# Patient Record
Sex: MALE | Race: WHITE | Employment: OTHER | URBAN - METROPOLITAN AREA
[De-identification: names, ages, dates, MRNs, and addresses within clinical notes are randomized per-mention and may not be internally consistent; named-entity substitution may affect disease eponyms.]

---

## 2017-01-20 ENCOUNTER — MYC REFILL (OUTPATIENT)
Dept: FAMILY MEDICINE | Facility: CLINIC | Age: 72
End: 2017-01-20

## 2017-01-20 DIAGNOSIS — K21.9 GASTROESOPHAGEAL REFLUX DISEASE WITHOUT ESOPHAGITIS: ICD-10-CM

## 2017-01-20 NOTE — TELEPHONE ENCOUNTER
Message from MyChart:  Original authorizing provider: MD Doug Braswell would like a refill of the following medications:  ranitidine (ZANTAC) 150 MG tablet [Liliam Weinberg MD]    Preferred pharmacy: [facility] [address] [place] [address] [address] AT [facility]    Comment:

## 2017-01-20 NOTE — TELEPHONE ENCOUNTER
Request for medication refill:    Date of last visit at clinic: 12/8/16    Please complete refill if appropriate and CLOSE ENCOUNTER.    Closing the encounter signifies the refill is complete.    If refill has been denied, please complete the smart phrase .smirefuse and route it to the [facility] RN TRIAGE pool to inform the patient and the pharmacy.    Lore Juárez RN

## 2017-02-06 ENCOUNTER — MYC REFILL (OUTPATIENT)
Dept: FAMILY MEDICINE | Facility: CLINIC | Age: 72
End: 2017-02-06

## 2017-02-06 DIAGNOSIS — I10 BENIGN ESSENTIAL HYPERTENSION: ICD-10-CM

## 2017-02-06 RX ORDER — HYDROCHLOROTHIAZIDE 25 MG/1
25 TABLET ORAL DAILY
Qty: 90 TABLET | Refills: 3 | Status: SHIPPED | OUTPATIENT
Start: 2017-02-06 | End: 2018-03-05

## 2017-02-06 RX ORDER — ENALAPRIL MALEATE 20 MG/1
40 TABLET ORAL DAILY
Qty: 180 TABLET | Refills: 1 | Status: SHIPPED | OUTPATIENT
Start: 2017-02-06 | End: 2017-08-18

## 2017-02-06 NOTE — TELEPHONE ENCOUNTER
Date of last visit at clinic: 12/8/16    Please complete refill and CLOSE ENCOUNTER.  Closing the encounter signifies the refill is complete.    Sherri Yen RN

## 2017-02-06 NOTE — TELEPHONE ENCOUNTER
Message from MyChart:  Original authorizing provider: MD Doug Braswell would like a refill of the following medications:  hydrochlorothiazide (HYDRODIURIL) 25 MG tablet [Liliam Weinberg MD]  enalapril (VASOTEC) 20 MG tablet [Liliam Weinberg MD]    Preferred pharmacy: [facility] [address] [place] [address] [address] AT [facility]    Comment:

## 2017-03-24 ENCOUNTER — AMBULATORY - HEALTHEAST (OUTPATIENT)
Dept: HEALTH INFORMATION MANAGEMENT | Facility: CLINIC | Age: 72
End: 2017-03-24

## 2017-04-24 ENCOUNTER — MYC REFILL (OUTPATIENT)
Dept: FAMILY MEDICINE | Facility: CLINIC | Age: 72
End: 2017-04-24

## 2017-04-24 DIAGNOSIS — L30.9 CHRONIC ECZEMA: ICD-10-CM

## 2017-04-25 RX ORDER — TRIAMCINOLONE ACETONIDE 1 MG/G
CREAM TOPICAL
Qty: 30 G | Refills: 0 | Status: SHIPPED | OUTPATIENT
Start: 2017-04-25 | End: 2022-05-17

## 2017-04-25 NOTE — TELEPHONE ENCOUNTER
Message from MyChart:  Original authorizing provider: MD Doug Braswell would like a refill of the following medications:  triamcinolone (KENALOG) 0.1 % cream [Liliam Weinberg MD]    Preferred pharmacy: [facility] [address] [place] [address] [address] AT [facility]    Comment:

## 2017-05-26 ENCOUNTER — OFFICE VISIT (OUTPATIENT)
Dept: FAMILY MEDICINE | Facility: CLINIC | Age: 72
End: 2017-05-26

## 2017-05-26 VITALS
SYSTOLIC BLOOD PRESSURE: 143 MMHG | RESPIRATION RATE: 18 BRPM | TEMPERATURE: 98 F | BODY MASS INDEX: 34.82 KG/M2 | WEIGHT: 315 LBS | OXYGEN SATURATION: 99 % | HEART RATE: 64 BPM | DIASTOLIC BLOOD PRESSURE: 85 MMHG

## 2017-05-26 DIAGNOSIS — I10 BENIGN ESSENTIAL HYPERTENSION: Primary | ICD-10-CM

## 2017-05-26 DIAGNOSIS — K21.9 GASTROESOPHAGEAL REFLUX DISEASE WITHOUT ESOPHAGITIS: ICD-10-CM

## 2017-05-26 DIAGNOSIS — J30.89 SEASONAL ALLERGIC RHINITIS DUE TO OTHER ALLERGIC TRIGGER: ICD-10-CM

## 2017-05-26 NOTE — PROGRESS NOTES
HPI:       Doug Nieves is a 71 year old who presents for the following  Patient presents with:  6 Month Follow Up  RECHECK: BP      Hypertension Follow-up      Outpatient blood pressures are being checked at home.  Results are 107//71.    Chest Pain? :No     Low Salt Diet: low salt    Daily NSAID Use? YES aspirin    Did patient take their HTN pills today/last night as usual?  Yes             GERD/Heartburn     Onset: 8-10+ years    Description:     Burning in chest:Yes     Intensity: moderate    Progression of Symptoms: waxing and waning    Accompanying Signs & Symptoms:  Does it feel like food gets stuck:no  Nausea: no  Vomiting (bloody?): no  Abdominal Pain: no  Black-Tarry stools:no  Bloody stools: no   History:   Previous ulcers: {no    Precipitating factors:   Caffeine use: Yes   Alcohol use: Yes Details: occasional  NSAID/Aspirin use: Yes 81 MG low dose  Tobacco use: no  Worse with no particular food or drink.    Alleviating factors:  Zantac, Tums Details:has helped with Sx  Pt states that he has worked on his diet with less liquor (3 drinks out of 5/7 day week), caffeine (2-3 cups a day),   EGD was in 2005 and normal then.         Therapies Tried and outcome:chewable antacid  Also had questions regarding chronic rhinitis and states that its a constant issue. Says that [place] suffer [place], when he returned home it returned. Thinks it could be related to environment air.  Not sure if tried antihistamines. Is using Mucinex and that works and saline nose sprays but can't seem to fully clear his am congestion.       Problem, Medication and Allergy Lists were reviewed and are current.  Patient is an established patient of this clinic.         Review of Systems:   Review of Systems          Physical Exam:     Patient Vitals for the past 24 hrs:   BP Temp Temp src Pulse Resp SpO2 Weight   05/26/17 0829 143/85 - - - - - -   05/26/17 0828 135/83 - - - -  - -   05/26/17 0827 (!) 137/101 - - - - - -   05/26/17 0826 (!) 152/94 - - - - - -   05/26/17 0750 (!) 158/91 98  F (36.7  C) Oral 64 18 99 % (!) 317 lb (143.8 kg)     Body mass index is 34.82 kg/(m^2).  Vitals were reviewed and were normal  Vital signs normal except slightly elevated BP     Physical Exam   Constitutional: He appears well-developed and well-nourished. No distress.   Cardiovascular: Normal rate, regular rhythm and normal heart sounds.    Pulmonary/Chest: Effort normal and breath sounds normal. No respiratory distress.   Musculoskeletal: He exhibits no edema.         Results:       Assessment and Plan     Doug was seen today for 6 month follow up and recheck.    Diagnoses and all orders for this visit:    Benign essential hypertension - his numbers at home are great. Here a bit higher but brings his cuff in and it is not correlating well.  His SBP with home cuff is 8 points lower and the diastolic was 2 points lower.  He is on 3 meds for BP and may well also have some white coat HTN since he home BPs are lower than here too.   Plan to have a new BP cuff and to send me his numbers. If continue high will need to bring that in and check it.   Next visit will do labs and repeat HgA1c. Weight is down today, so won't recheck today.   -     order for DME; Blood pressure cuff    Gastroesophageal reflux disease without esophagitis - change to Prilosec x 1 month and then return to Ranitidine if improved. If not better, needs EGD.  He is also working on life style, which includes dropping his coffee. Used to drink 5 cups, now down to 3.   -     omeprazole (PRILOSEC) 20 MG CR capsule; Take 1 capsule (20 mg) by mouth daily    Seasonal allergic rhinitis due to other allergic trigger - trial of flonase. Consider atrovent if no better.   -     fluticasone (VERAMYST) 27.5 MCG/SPRAY spray; Spray 1-2 sprays into both nostrils daily        Total time - 30 min with more than half spent counseling on the mgmt of HTN and  GERD.    There are no discontinued medications.  Options for treatment and follow-up care were reviewed with the patient. Doug Nieves  engaged in the decision making process and verbalized understanding of the options discussed and agreed with the final plan.    Liliam Weinberg MD

## 2017-05-26 NOTE — MR AVS SNAPSHOT
After Visit Summary   5/26/2017    Doug Nieves    MRN: [id number]           Patient Information     Date Of Birth          1945        Visit Information        Provider Department      5/26/2017 8:00 AM Liliam Weinberg MD [facility]        Today's Diagnoses     Benign essential hypertension    -  1    Gastroesophageal reflux disease without esophagitis        Seasonal allergic rhinitis due to other allergic trigger          Care Instructions    Here is the plan from today's visit    1. Benign essential hypertension  We will write for another cuff.  Continue monitoring and if it is above 140/90, then MyCHart me and we can discuss options:  ALSO - taper the coffee.  Keep alcohol at no more than 1 (rarely 2) drinks a day    2. Gastroesophageal reflux disease without esophagitis  Stop the Rantidine  Start Prilosec 20 mg a day - take it on an empty stomach in the am and then wait 30 min to eat.   Do this for a month - if you are fine then, cross back to Ranitidine for maintenance.  Overlap them some, so start the Ranitidine while on the Prilosec and use together for a few a days.  Ranitidine won't work as well at first but should over the next month  Tapering the coffee, loose belts, consider books under the head of the bed's legs to put the mattress at an incline, acid foods (tomatoe, orange juice, lemonade)  If you are no better - we should do endoscopy      3. Seasonal allergic rhinitis due to other allergic trigger  Try the nose spray daily for a month.  If that doesn't help, then consider contacting me and we can try Atrovent nasal spray.   - fluticasone (VERAMYST) 27.5 MCG/SPRAY spray; Spray 1-2 sprays into both nostrils daily  Dispense: 10 g; Refill: 3        Please call or return to clinic if your symptoms don't go away.    Follow up plan  See me in the six months    Thank you for coming to [facility] today.  Lab Testing:  **If you had lab testing today and your  results are reassuring or normal they will be mailed to you or sent through [employer] within 7 days.   **If the lab tests need quick action we will call you with the results.  The phone number we will call with results is # [phone] (home) . If this is not the best number please call our clinic and change the number.  Medication Refills:  If you need any refills please call your pharmacy and they will contact us.   If you need to  your refill at a new pharmacy, please contact the new pharmacy directly. The new pharmacy will help you get your medications transferred faster.   Scheduling:  If you have any concerns about today's visit or wish to schedule another appointment please call our office during normal business hours [phone] (8-5:00 M-F)  If a referral was made to a [facility] and you don't get a call from central scheduling please call [phone].  If a Mammogram was ordered for you at [facility] call [phone] to schedule or change your appointment.  If you had an XRay/CT/Ultrasound/MRI ordered the number is [phone] to schedule or change your radiology appointment.   Medical Concerns:  If you have urgent medical concerns please call [phone] at any time of the day.  If you have a medical emergency please call [phone].          Follow-ups after your visit        Follow-up notes from your care team     Return in about 6 months (around 11/26/2017).      Who to contact     Please call your clinic at [phone] to:    Ask questions about your health    Make or cancel appointments    Discuss your medicines    Learn about your test results    Speak to your doctor   If you have compliments or concerns about an experience at your clinic, or if you wish to file a complaint, please contact [facility] Patient Relations at [phone] or email us at [email or web address]         Additional Information About Your Visit         IDRI ([employer])hart Information     [employer] gives you secure access to your electronic health record. If you see a primary care provider, you can also send messages to your care team and make appointments. If you have questions, please call your primary care clinic.  If you do not have a primary care provider, please call [phone] and they will assist you.      [employer] is an electronic gateway that provides easy, online access to your medical records. With [employer], you can request a clinic appointment, read your test results, renew a prescription or communicate with your care team.     To access your existing account, please contact your [facility] or call [phone] for assistance.        Care EveryWhere ID     This is your Care EveryWhere ID. This could be used by other organizations to access your [facility] medical records  [id number]        Your Vitals Were     Pulse Temperature Respirations Pulse Oximetry BMI (Body Mass Index)       64 98  F (36.7  C) (Oral) 18 99% 34.82 kg/m2        Blood Pressure from Last 3 Encounters:   05/26/17 143/85   12/08/16 135/85   06/24/16 133/81    Weight from Last 3 Encounters:   05/26/17 (!) 317 lb (143.8 kg)   12/08/16 (!) 320 lb 9.6 oz (145.4 kg)   06/24/16 (!) 321 lb 6.4 oz (145.8 kg)              Today, you had the following     No orders found for display         Today's Medication Changes          These changes are accurate as of: 5/26/17  8:27 AM.  If you have any questions, ask your nurse or doctor.               Start taking these medicines.        Dose/Directions    fluticasone 27.5 MCG/SPRAY spray   Commonly known as:  VERAMYST   Used for:  Seasonal allergic rhinitis due to other allergic trigger   Started by:  Liliam Weinberg MD        Dose:  1-2 spray   Spray 1-2 sprays into both nostrils daily   Quantity:  10 g   Refills:  3       omeprazole 20 MG CR capsule   Commonly known as:  priLOSEC   Used for:  Gastroesophageal reflux disease  without esophagitis   Started by:  Liliam Weinberg MD        Dose:  20 mg   Take 1 capsule (20 mg) by mouth daily   Quantity:  30 capsule   Refills:  1       order for DME   Used for:  Benign essential hypertension   Started by:  iLliam Weinberg MD        Blood pressure cuff   Quantity:  1 Units   Refills:  0            Where to get your medicines      These medications were sent to [facility] [address] [place] [address] [address] AT [place] [address] [address], [facility] [address]     Phone:  [phone]     fluticasone 27.5 MCG/SPRAY spray    omeprazole 20 MG CR capsule         Some of these will need a paper prescription and others can be bought over the counter.  Ask your nurse if you have questions.     Bring a paper prescription for each of these medications     order for DME                Primary Care Provider Office Phone # Fax #    Liliam Weinberg [phone]       [facility] [address] [address]        Thank you!     Thank you for choosing [facility]  for your care. Our goal is always to provide you with excellent care. Hearing back from our patients is one way we can continue to improve our services. Please take a few minutes to complete the written survey that you may receive in the mail after your visit with us. Thank you!             Your Updated Medication List - Protect others around you: Learn how to safely use, store and throw away your medicines at www.disposemymeds.org.          This list is accurate as of: 5/26/17  8:27 AM.  Always use your most recent med list.                   Brand Name Dispense Instructions for use    amLODIPine 5 MG tablet    NORVASC    90 tablet    Take 1 tablet (5 mg) by mouth daily       ASPIRIN LOW DOSE 81 MG tablet   Generic drug:  aspirin      Take 1 tablet by mouth daily.       * atorvastatin 20 MG tablet    LIPITOR    90 tablet    Take 1 tablet (20 mg) by  mouth daily       * atorvastatin 40 MG tablet    LIPITOR    90 tablet    Take 1 tablet (40 mg) by mouth daily       Blood Glucose Monitoring Suppl Génesis      Use as directed       enalapril 20 MG tablet    VASOTEC    180 tablet    Take 2 tablets (40 mg) by mouth daily       fluticasone 27.5 MCG/SPRAY spray    VERAMYST    10 g    Spray 1-2 sprays into both nostrils daily       hydrochlorothiazide 25 MG tablet    HYDRODIURIL    90 tablet    Take 1 tablet (25 mg) by mouth daily       omeprazole 20 MG CR capsule    priLOSEC    30 capsule    Take 1 capsule (20 mg) by mouth daily       order for DME     1 Units    Blood pressure cuff       ranitidine 150 MG tablet    ZANTAC    180 tablet    Take 1 tablet (150 mg) by mouth 2 times daily       rosuvastatin 20 MG tablet    CRESTOR    90 tablet    Take 1 tablet (20 mg) by mouth daily       triamcinolone 0.1 % cream    KENALOG    30 g    Apply sparingly to affected area three times daily for 14 days.       * Notice:  This list has 2 medication(s) that are the same as other medications prescribed for you. Read the directions carefully, and ask your doctor or other care provider to review them with you.

## 2017-05-26 NOTE — PATIENT INSTRUCTIONS
Here is the plan from today's visit    1. Benign essential hypertension  We will write for another cuff.  Continue monitoring and if it is above 140/90, then MyCHart me and we can discuss options:  ALSO - taper the coffee.  Keep alcohol at no more than 1 (rarely 2) drinks a day    2. Gastroesophageal reflux disease without esophagitis  Stop the Rantidine  Start Prilosec 20 mg a day - take it on an empty stomach in the am and then wait 30 min to eat.   Do this for a month - if you are fine then, cross back to Ranitidine for maintenance.  Overlap them some, so start the Ranitidine while on the Prilosec and use together for a few a days.  Ranitidine won't work as well at first but should over the next month  Tapering the coffee, loose belts, consider books under the head of the bed's legs to put the mattress at an incline, acid foods (tomatoe, orange juice, lemonade)  If you are no better - we should do endoscopy      3. Seasonal allergic rhinitis due to other allergic trigger  Try the nose spray daily for a month.  If that doesn't help, then consider contacting me and we can try Atrovent nasal spray.   - fluticasone (VERAMYST) 27.5 MCG/SPRAY spray; Spray 1-2 sprays into both nostrils daily  Dispense: 10 g; Refill: 3        Please call or return to clinic if your symptoms don't go away.    Follow up plan  See me in the six months    Thank you for coming to [facility] today.  Lab Testing:  **If you had lab testing today and your results are reassuring or normal they will be mailed to you or sent through "[employer] within 7 days.   **If the lab tests need quick action we will call you with the results.  The phone number we will call with results is # [phone] (home) . If this is not the best number please call our clinic and change the number.  Medication Refills:  If you need any refills please call your pharmacy and they will contact us.   If you need to  your refill at a new pharmacy, please contact the  new pharmacy directly. The new pharmacy will help you get your medications transferred faster.   Scheduling:  If you have any concerns about today's visit or wish to schedule another appointment please call our office during normal business hours [phone] (8-5:00 M-F)  If a referral was made to a [facility] and you don't get a call from central scheduling please call [phone].  If a Mammogram was ordered for you at [facility] call [phone] to schedule or change your appointment.  If you had an XRay/CT/Ultrasound/MRI ordered the number is [phone] to schedule or change your radiology appointment.   Medical Concerns:  If you have urgent medical concerns please call [phone] at any time of the day.  If you have a medical emergency please call [phone].

## 2017-06-19 ENCOUNTER — OFFICE VISIT (OUTPATIENT)
Dept: FAMILY MEDICINE | Facility: CLINIC | Age: 72
End: 2017-06-19

## 2017-06-19 VITALS
BODY MASS INDEX: 36.08 KG/M2 | DIASTOLIC BLOOD PRESSURE: 85 MMHG | TEMPERATURE: 98.5 F | HEART RATE: 90 BPM | SYSTOLIC BLOOD PRESSURE: 136 MMHG | OXYGEN SATURATION: 97 % | HEIGHT: 78 IN | RESPIRATION RATE: 16 BRPM | WEIGHT: 311.8 LBS

## 2017-06-19 DIAGNOSIS — H61.23 BILATERAL IMPACTED CERUMEN: Primary | ICD-10-CM

## 2017-06-19 ASSESSMENT — ENCOUNTER SYMPTOMS
ACTIVITY CHANGE: 0
FATIGUE: 0
APPETITE CHANGE: 0
FEVER: 0
NECK STIFFNESS: 1
RHINORRHEA: 1
SLEEP DISTURBANCE: 0
NECK PAIN: 0
HEADACHES: 0
UNEXPECTED WEIGHT CHANGE: 0

## 2017-06-19 NOTE — MR AVS SNAPSHOT
After Visit Summary   6/19/2017    Doug iNeves    MRN: [id number]           Patient Information     Date Of Birth          1945        Visit Information        Provider Department      6/19/2017 2:00 PM Patric Merritt MD [facility]        Today's Diagnoses     Bilateral impacted cerumen    -  1      Care Instructions      Earwax, Home Treatment    Everyone produces earwax from the lining of the ear canal. It serves to lubricate and protect the ear. The wax that forms in the canal naturally moves toward the outside of the ear and falls out. Sometimes the ear canal may contain too much wax. This can cause a blockage and loss of hearing. Directions are given below for home treatment.  Home care  If your doctor has advised you to remove a wax blockage yourself, follow these directions:    Unless a medicine was prescribed, you may use an over-the-counter product made for clearing earwax. These contain carbamide peroxide. Lie down with the blocked ear facing upward. Apply one dropper full of medicine and wait a few minutes. Grasp the outer ear and wiggle it to help the solution enter the canal.    Lean over a sink or basin with the blocked ear facing downward. Use a bulb syringe filled with warm (not hot or cold) water to rinse the ear several times. Use gentle pressure only.    If you are having trouble draining the water out of your ear canal, put a few drops of rubbing alcohol (isopropyl alcohol) into the ear canal. This will help remove the remaining water.    Repeat this procedure once a day for up to three days, or until your hearing is back to normal. Do not use this treatment for more than three days in a row.  Don ts    Don t use cold water to rinse the ear. This will make you dizzy.    Don t perform this procedure if you have an ear infection.    Don t perform this procedure if you have a ruptured eardrum.    Don t use cotton swabs, matches, hairpins, keys, or other  objects to  clean  the ear canal. This can cause infection of the ear canal or rupture the eardrum. Because of their size and shape, cotton swabs can push earwax deeper into the ear canal instead of removing it.  Follow-up care  Follow up with your health care provider if you are not improving after three cleaning attempts, or as advised.  When to seek medical advice  Call your health care provider right away if any of these occur:    Worsening ear pain    Fever of 101 F (38.3 C) or higher, or as directed by your health care provider    Hearing does not return to normal after three days of treatment    Fluid drainage or bleeding from the ear canal    Swelling, redness, or tenderness of the outer ear    Headache, neck pain, or stiff neck    6829-7602 [employer]. [address] [place] [address], [place] [address]. All rights reserved. This information is not intended as a substitute for professional medical care. Always follow your healthcare professional's instructions.                Follow-ups after your visit        Who to contact     Please call your clinic at [phone] to:    Ask questions about your health    Make or cancel appointments    Discuss your medicines    Learn about your test results    Speak to your doctor   If you have compliments or concerns about an experience at your clinic, or if you wish to file a complaint, please contact [facility] Patient Relations at [phone] or email us at [email or web address]         Additional Information About Your Visit        Dealisedhart Information     [employer] gives you secure access to your electronic health record. If you see a primary care provider, you can also send messages to your care team and make appointments. If you have questions, please call your primary care clinic.  If you do not have a primary care provider, please call [phone] and they will assist you.      [employer] is an electronic gateway that  "provides easy, online access to your medical records. With [employer], you can request a clinic appointment, read your test results, renew a prescription or communicate with your care team.     To access your existing account, please contact your [facility] or call [phone] for assistance.        Care EveryWhere ID     This is your Care EveryWhere ID. This could be used by other organizations to access your [place] medical records  [id number]        Your Vitals Were     Pulse Temperature Respirations Height Pulse Oximetry BMI (Body Mass Index)    90 98.5  F (36.9  C) (Oral) 16 6' 8\" (203.2 cm) 97% 34.25 kg/m2       Blood Pressure from Last 3 Encounters:   06/19/17 136/85   05/26/17 143/85   12/08/16 135/85    Weight from Last 3 Encounters:   06/19/17 (!) 311 lb 12.8 oz (141.4 kg)   05/26/17 (!) 317 lb (143.8 kg)   12/08/16 (!) 320 lb 9.6 oz (145.4 kg)              Today, you had the following     No orders found for display       Primary Care Provider Office Phone # Fax #    Liliam Weinberg [phone]       [facility] [address] [address]        Thank you!     Thank you for choosing [facility]  for your care. Our goal is always to provide you with excellent care. Hearing back from our patients is one way we can continue to improve our services. Please take a few minutes to complete the written survey that you may receive in the mail after your visit with us. Thank you!             Your Updated Medication List - Protect others around you: Learn how to safely use, store and throw away your medicines at www.disposemymeds.org.          This list is accurate as of: 6/19/17  3:17 PM.  Always use your most recent med list.                   Brand Name Dispense Instructions for use    amLODIPine 5 MG tablet    NORVASC    90 tablet    Take 1 tablet (5 mg) by mouth daily       ASPIRIN LOW DOSE 81 MG tablet   Generic drug: "  aspirin      Take 1 tablet by mouth daily.       * atorvastatin 20 MG tablet    LIPITOR    90 tablet    Take 1 tablet (20 mg) by mouth daily       * atorvastatin 40 MG tablet    LIPITOR    90 tablet    Take 1 tablet (40 mg) by mouth daily       Blood Glucose Monitoring Suppl Génesis      Use as directed       enalapril 20 MG tablet    VASOTEC    180 tablet    Take 2 tablets (40 mg) by mouth daily       fluticasone 27.5 MCG/SPRAY spray    VERAMYST    10 g    Spray 1-2 sprays into both nostrils daily       hydrochlorothiazide 25 MG tablet    HYDRODIURIL    90 tablet    Take 1 tablet (25 mg) by mouth daily       omeprazole 20 MG CR capsule    priLOSEC    30 capsule    Take 1 capsule (20 mg) by mouth daily       order for DME     1 Units    Blood pressure cuff       ranitidine 150 MG tablet    ZANTAC    180 tablet    Take 1 tablet (150 mg) by mouth 2 times daily       rosuvastatin 20 MG tablet    CRESTOR    90 tablet    Take 1 tablet (20 mg) by mouth daily       triamcinolone 0.1 % cream    KENALOG    30 g    Apply sparingly to affected area three times daily for 14 days.       * Notice:  This list has 2 medication(s) that are the same as other medications prescribed for you. Read the directions carefully, and ask your doctor or other care provider to review them with you.

## 2017-06-19 NOTE — PROGRESS NOTES
"      HPI:       Doug Nieves is a 71 year old who presents for the following  Patient presents with:  Cerumen Impaction    Concern: feeling ears are clogged or full  Onset 2-3 weeks  [place] in nature, feels like pressure with decrease hearing acuity.   Has tried OTC wax removal.   Hx of similar problem in 3625-2695 and was due to ear wax. Seen by ENT and had a hearing evaluation which was normal.  Feels like ear is full (swmmers ear)     History of chronic rhinitis. Using mucenx DM and a nasal spray (flnase) .     Problem, Medication and Allergy Lists were reviewed and are current.  Patient is an established patient of this clinic.         Review of Systems:   Review of Systems   Constitutional: Negative for activity change, appetite change, fatigue, fever and unexpected weight change.   HENT: Positive for rhinorrhea. Negative for congestion and tinnitus.    Musculoskeletal: Positive for neck stiffness. Negative for neck pain.   Skin: Negative for rash.   Neurological: Negative for headaches.   Psychiatric/Behavioral: Negative for sleep disturbance.             Physical Exam:     Patient Vitals for the past 24 hrs:   BP Temp Temp src Pulse Resp SpO2 Height Weight   06/19/17 1408 136/85 98.5  F (36.9  C) Oral 90 16 97 % 6' 8\" (203.2 cm) (!) 311 lb 12.8 oz (141.4 kg)     Body mass index is 34.25 kg/(m^2).  Vitals were reviewed and were normal     Physical Exam   Constitutional: He is oriented to person, place, and time. He appears well-developed and well-nourished. No distress.   HENT:   Head: Normocephalic and atraumatic.   Right Ear: Tympanic membrane, external ear and ear canal normal.   Left Ear: Tympanic membrane and external ear normal.   Cerumen noted in both ears L>R    Irrigated. Able to visualize TM. Appeared WNL     Musculoskeletal:   Stand up and go test in less than 10 seconds.    Neurological: He is alert and oriented to person, place, and time.   Skin: Skin is warm. No rash noted. He is not " diaphoretic.   Psychiatric: He has a normal mood and affect. His behavior is normal.         Results:     None   Assessment and Plan     1. Bilateral impacted cerumen  Irrigated both ears. Pt reported resolution of his symptoms  Had hearing test done in 2009/ 2010 and was normal  - Ear wax removal    Reviewed HM items. No falls in the last 2 years. Get up and go test in less than 10 seconds     There are no discontinued medications.  Options for treatment and follow-up care were reviewed with the patient. Doug Nieves  engaged in the decision making process and verbalized understanding of the options discussed and agreed with the final plan.    Patric Merritt MD

## 2017-06-19 NOTE — PROGRESS NOTES
Preceptor Attestation:   Patient seen and discussed with the resident. Assessment and plan reviewed with resident and agreed upon.   Supervising Physician:  Ravin Cruz MD  [facility]

## 2017-06-19 NOTE — PATIENT INSTRUCTIONS
Earwax, Home Treatment    Everyone produces earwax from the lining of the ear canal. It serves to lubricate and protect the ear. The wax that forms in the canal naturally moves toward the outside of the ear and falls out. Sometimes the ear canal may contain too much wax. This can cause a blockage and loss of hearing. Directions are given below for home treatment.  Home care  If your doctor has advised you to remove a wax blockage yourself, follow these directions:    Unless a medicine was prescribed, you may use an over-the-counter product made for clearing earwax. These contain carbamide peroxide. Lie down with the blocked ear facing upward. Apply one dropper full of medicine and wait a few minutes. Grasp the outer ear and wiggle it to help the solution enter the canal.    Lean over a sink or basin with the blocked ear facing downward. Use a bulb syringe filled with warm (not hot or cold) water to rinse the ear several times. Use gentle pressure only.    If you are having trouble draining the water out of your ear canal, put a few drops of rubbing alcohol (isopropyl alcohol) into the ear canal. This will help remove the remaining water.    Repeat this procedure once a day for up to three days, or until your hearing is back to normal. Do not use this treatment for more than three days in a row.  Don ts    Don t use cold water to rinse the ear. This will make you dizzy.    Don t perform this procedure if you have an ear infection.    Don t perform this procedure if you have a ruptured eardrum.    Don t use cotton swabs, matches, hairpins, keys, or other objects to  clean  the ear canal. This can cause infection of the ear canal or rupture the eardrum. Because of their size and shape, cotton swabs can push earwax deeper into the ear canal instead of removing it.  Follow-up care  Follow up with your health care provider if you are not improving after three cleaning attempts, or as advised.  When to seek medical  advice  Call your health care provider right away if any of these occur:    Worsening ear pain    Fever of 101 F (38.3 C) or higher, or as directed by your health care provider    Hearing does not return to normal after three days of treatment    Fluid drainage or bleeding from the ear canal    Swelling, redness, or tenderness of the outer ear    Headache, neck pain, or stiff neck    7517-6353 [employer]. [address] [place] [address]. All rights reserved. This information is not intended as a substitute for professional medical care. Always follow your healthcare professional's instructions.

## 2017-07-19 ENCOUNTER — MYC MEDICAL ADVICE (OUTPATIENT)
Dept: FAMILY MEDICINE | Facility: CLINIC | Age: 72
End: 2017-07-19

## 2017-07-19 NOTE — TELEPHONE ENCOUNTER
"RN called patient to discuss \"cyst in armpit\". Patient states it is his Right armpit on lower part. States it seems surface level. States he noticed it this morning and it is tender. States it is bright red and nickel sized. States he was golfing yesterday but it does not seem like shearing or chaffing to him. RN advised would be a good idea to have him come in to clinic to have it looked at. RN instructed if area gets bigger or more painful, drainage or has a fever to go in to urgent care. Pt verbalized understanding.     Bee Kraft RN    "

## 2017-08-18 ENCOUNTER — MYC REFILL (OUTPATIENT)
Dept: FAMILY MEDICINE | Facility: CLINIC | Age: 72
End: 2017-08-18

## 2017-08-18 DIAGNOSIS — I10 BENIGN ESSENTIAL HYPERTENSION: ICD-10-CM

## 2017-08-18 RX ORDER — ENALAPRIL MALEATE 20 MG/1
40 TABLET ORAL DAILY
Qty: 180 TABLET | Refills: 1 | Status: SHIPPED | OUTPATIENT
Start: 2017-08-18 | End: 2018-03-03

## 2017-08-18 NOTE — TELEPHONE ENCOUNTER
Request for medication refill:    Date of last visit at clinic: 6/19/17    Please complete refill if appropriate and CLOSE ENCOUNTER.    Closing the encounter signifies the refill is complete.    If refill has been denied, please complete the smart phrase .smirefuse and route it to the [facility] RN TRIAGE pool to inform the patient and the pharmacy.    Bee Kraft RN

## 2017-08-18 NOTE — TELEPHONE ENCOUNTER
Message from Sojernhart:  Original authorizing provider: MD Doug Wise would like a refill of the following medications:  enalapril (VASOTEC) 20 MG tablet [Kingsley Keenan MD]    Preferred pharmacy: [facility] [address] [place] [address] [address] AT [facility]    Comment:

## 2017-10-02 DIAGNOSIS — J30.2 CHRONIC SEASONAL ALLERGIC RHINITIS, UNSPECIFIED TRIGGER: Primary | ICD-10-CM

## 2017-10-02 NOTE — TELEPHONE ENCOUNTER
Request for medication refill:    Date of last visit at clinic: 6-19-17    Please complete refill if appropriate and CLOSE ENCOUNTER.    Closing the encounter signifies the refill is complete.    If refill has been denied, please complete the smart phrase .smirefuse and route it to the [facility] RN TRIAGE pool to inform the patient and the pharmacy.    Macarena Rodriguez, CMA

## 2017-10-03 ENCOUNTER — ALLIED HEALTH/NURSE VISIT (OUTPATIENT)
Dept: FAMILY MEDICINE | Facility: CLINIC | Age: 72
End: 2017-10-03

## 2017-10-03 ENCOUNTER — TELEPHONE (OUTPATIENT)
Dept: FAMILY MEDICINE | Facility: CLINIC | Age: 72
End: 2017-10-03

## 2017-10-03 DIAGNOSIS — Z00.00 PREVENTATIVE HEALTH CARE: Primary | ICD-10-CM

## 2017-10-03 NOTE — MR AVS SNAPSHOT
After Visit Summary   10/3/2017    Doug Nieves    MRN: [id number]           Patient Information     Date Of Birth          1945        Visit Information        Provider Department      10/3/2017 10:00 AM [facility]        Today's Diagnoses     Preventative health care    -  1       Follow-ups after your visit        Who to contact     Please call your clinic at [phone] to:    Ask questions about your health    Make or cancel appointments    Discuss your medicines    Learn about your test results    Speak to your doctor   If you have compliments or concerns about an experience at your clinic, or if you wish to file a complaint, please contact [facility] Patient Relations at [phone] or email us at [email or web address]         Additional Information About Your Visit        MyChart Information     [employer] gives you secure access to your electronic health record. If you see a primary care provider, you can also send messages to your care team and make appointments. If you have questions, please call your primary care clinic.  If you do not have a primary care provider, please call [phone] and they will assist you.      [employer]olution is an electronic gateway that provides easy, online access to your medical records. With [employer], you can request a clinic appointment, read your test results, renew a prescription or communicate with your care team.     To access your existing account, please contact your [facility] or call [phone] for assistance.        Care EveryWhere ID     This is your Care EveryWhere ID. This could be used by other organizations to access your [place] medical records  [id number]         Blood Pressure from Last 3 Encounters:   06/19/17 136/85   05/26/17 143/85   12/08/16 135/85    Weight from Last 3 Encounters:   06/19/17 (!) 311 lb 12.8 oz (141.4 kg)    05/26/17 (!) 317 lb (143.8 kg)   12/08/16 (!) 320 lb 9.6 oz (145.4 kg)              We Performed the Following     ADMIN VACCINE, INITIAL     Flu vaccine, high dose        Primary Care Provider Office Phone # Fax #    Liliam Weinberg [phone]       [address] [address]        Equal Access to Services     BRANDON LOGAN : Hadii aad ku hadasho Soomaali, waaxda luqadaha, qaybta kaalmada adeegyada, waxay idiin hayaan adeeg kharash la'aan . So [facility] [phone].    ATENCIÓN: Si chase pugh, tiene a hawk disposición servicios gratuitos de asistencia lingüística. Llame al [phone].    We comply with applicable federal civil rights laws and [place] laws. We do not discriminate on the basis of race, color, national origin, age, disability, sex, sexual orientation, or gender identity.            Thank you!     Thank you for choosing \[facility]\"" [facility]  for your care. Our goal is always to provide you with excellent care. Hearing back from our patients is one way we can continue to improve our services. Please take a few minutes to complete the written survey that you may receive in the mail after your visit with us. Thank you!             Your Updated Medication List - Protect others around you: Learn how to safely use, store and throw away your medicines at www.disposemymeds.org.          This list is accurate as of: 10/3/17 11:59 PM.  Always use your most recent med list.                   Brand Name Dispense Instructions for use Diagnosis    amLODIPine 5 MG tablet    NORVASC    90 tablet    Take 1 tablet (5 mg) by mouth daily    Essential hypertension       ASPIRIN LOW DOSE 81 MG tablet   Generic drug:  aspirin      Take 1 tablet by mouth daily.        * atorvastatin 20 MG tablet    LIPITOR    90 tablet    Take 1 tablet (20 mg) by mouth daily    Hyperlipidemia LDL goal <130       * atorvastatin 40 MG tablet    LIPITOR    90 tablet    Take 1 tablet (40 mg)  by mouth daily    Essential hypertension       Blood Glucose Monitoring Suppl Génesis      Use as directed        enalapril 20 MG tablet    VASOTEC    180 tablet    Take 2 tablets (40 mg) by mouth daily    Benign essential hypertension       fluticasone 27.5 MCG/SPRAY spray    VERAMYST    10 g    Spray 1-2 sprays into both nostrils daily    Chronic seasonal allergic rhinitis, unspecified trigger       hydrochlorothiazide 25 MG tablet    HYDRODIURIL    90 tablet    Take 1 tablet (25 mg) by mouth daily    Benign essential hypertension       omeprazole 20 MG CR capsule    priLOSEC    30 capsule    Take 1 capsule (20 mg) by mouth daily    Gastroesophageal reflux disease without esophagitis       order for DME     1 Units    Blood pressure cuff    Benign essential hypertension       ranitidine 150 MG tablet    ZANTAC    180 tablet    Take 1 tablet (150 mg) by mouth 2 times daily    Gastroesophageal reflux disease without esophagitis       rosuvastatin 20 MG tablet    CRESTOR    90 tablet    Take 1 tablet (20 mg) by mouth daily    Essential hypertension with goal blood pressure less than 130/80       triamcinolone 0.1 % cream    KENALOG    30 g    Apply sparingly to affected area three times daily for 14 days.    Chronic eczema       * Notice:  This list has 2 medication(s) that are the same as other medications prescribed for you. Read the directions carefully, and ask your doctor or other care provider to review them with you.

## 2017-10-03 NOTE — NURSING NOTE
Doug Nieves      1.  Has the patient received the information for the influenza vaccine? YES    2.  Does the patient have any of the following contraindications?     Allergy to eggs? No     Allergic reaction to previous influenza vaccines? No     Any other problems to previous influenza vaccines? No     Paralyzed by Guillain-Yulee syndrome? No     Currently pregnant? NO     Current moderate or severe illness? No    Vaccination given by Thelma Rojas CMA.  Recorded by Thelma Rojas

## 2017-10-07 ENCOUNTER — OFFICE VISIT (OUTPATIENT)
Dept: URGENT CARE | Facility: URGENT CARE | Age: 72
End: 2017-10-07
Payer: COMMERCIAL

## 2017-10-07 VITALS
TEMPERATURE: 98 F | OXYGEN SATURATION: 98 % | DIASTOLIC BLOOD PRESSURE: 78 MMHG | SYSTOLIC BLOOD PRESSURE: 126 MMHG | HEART RATE: 66 BPM | WEIGHT: 304 LBS | BODY MASS INDEX: 35.17 KG/M2 | HEIGHT: 78 IN

## 2017-10-07 DIAGNOSIS — R09.82 POST-NASAL DRIP: Primary | ICD-10-CM

## 2017-10-07 DIAGNOSIS — K21.9 GASTROESOPHAGEAL REFLUX DISEASE WITHOUT ESOPHAGITIS: ICD-10-CM

## 2017-10-07 PROCEDURE — 99213 OFFICE O/P EST LOW 20 MIN: CPT | Performed by: INTERNAL MEDICINE

## 2017-10-07 RX ORDER — GUAIFENESIN 600 MG/1
400 TABLET, EXTENDED RELEASE ORAL 2 TIMES DAILY
COMMUNITY
End: 2022-06-21

## 2017-10-07 RX ORDER — FLUTICASONE PROPIONATE 50 MCG
1-2 SPRAY, SUSPENSION (ML) NASAL DAILY
Qty: 1 BOTTLE | Refills: 0 | Status: SHIPPED | OUTPATIENT
Start: 2017-10-07 | End: 2018-02-12

## 2017-10-07 RX ORDER — OMEPRAZOLE 40 MG/1
40 CAPSULE, DELAYED RELEASE ORAL DAILY
Qty: 30 CAPSULE | Refills: 0 | Status: SHIPPED | OUTPATIENT
Start: 2017-10-07 | End: 2017-10-17

## 2017-10-07 RX ORDER — LORATADINE 10 MG/1
10 TABLET ORAL DAILY
COMMUNITY
End: 2018-12-27

## 2017-10-07 ASSESSMENT — ENCOUNTER SYMPTOMS
FEVER: 0
SORE THROAT: 1

## 2017-10-07 NOTE — MR AVS SNAPSHOT
After Visit Summary   10/7/2017    Doug Nieves    MRN: [id number]           Patient Information     Date Of Birth          1945        Visit Information        Provider Department      10/7/2017 1:15 PM Genna Haddad MD [facility]        Today's Diagnoses     Post-nasal drip    -  1    Gastroesophageal reflux disease without esophagitis          Care Instructions    Restart flonase  Restart omeprazole at 40 mg day    Recheck with primary clinic towards end of next week.      Tips to Control Acid Reflux    To control acid reflux, you ll need to make some basic diet and lifestyle changes. The simple steps outlined below may be all you ll need to ease discomfort.  Watch what you eat    Avoid fatty foods and spicy foods.    Eat fewer acidic foods, such as citrus and tomato-based foods. These can increase symptoms.    Limit drinking alcohol, caffeine, and fizzy beverages. All increase acid reflux.    Try limiting chocolate, peppermint, and spearmint. These can worsen acid reflux in some people.  Watch when you eat    Avoid lying down for 3 hours after eating.    Do not snack before going to bed.  Raise your head  Raising your head and upper body by 4 to 6 inches helps limit reflux when you re lying down. Put blocks under the head of your bed frame to raise it.  Other changes    Lose weight, if you need to    Don t exercise near bedtime    Avoid tight-fitting clothes    Limit aspirin and ibuprofen    Stop smoking   Date Last Reviewed: 7/1/2016 2000-2017 [employer]. [address] [place] [address], [place] [address]. All rights reserved. This information is not intended as a substitute for professional medical care. Always follow your healthcare professional's instructions.        How Acid Reflux Affects Your Throat    Do you have to clear your throat or cough often? Are you hoarse? Do you have trouble swallowing? If you have these or other throat symptoms,  you may have acid reflux. This occurs when stomach acid flows back up and irritates your throat.  Why you have throat symptoms  There are muscles (esophageal sphincters) at both ends of the tube that carries food to your stomach (the esophagus). These muscles relax to let food pass. Then they tighten to keep stomach acid down. When the lower esophageal sphincter (LES) doesn t tighten enough, acid can flow back (reflux) from your stomach into your esophagus. This may cause heartburn. In some cases the upper esophageal sphincter (UES) also doesn t work well. Then acid can travel higher and enter your throat (pharynx). In many cases, this causes throat symptoms.  Common throat symptoms    Need to clear your throat often    Feeling like you re choking    Long-term (chronic) cough    Hoarseness    Trouble swallowing    Feel like you have a lump in your throat    Sour or acid taste    Sore throat that keeps coming back   Date Last Reviewed: 7/1/2016 2000-2017 [employer]. [address] [place] [address], [place] [address]. All rights reserved. This information is not intended as a substitute for professional medical care. Always follow your healthcare professional's instructions.                Follow-ups after your visit        Who to contact     If you have questions or need follow up information about today's clinic visit or your schedule please contact [facility] directly at [phone].  Normal or non-critical lab and imaging results will be communicated to you by MyChart, letter or phone within 4 business days after the clinic has received the results. If you do not hear from us within 7 days, please contact the clinic through MyChart or phone. If you have a critical or abnormal lab result, we will notify you by phone as soon as possible.  Submit refill requests through [employer] or call your pharmacy and they will forward the refill request to us. Please allow 3 business days for your  "refill to be completed.          Additional Information About Your Visit        [employer] Information     [employer] gives you secure access to your electronic health record. If you see a primary care provider, you can also send messages to your care team and make appointments. If you have questions, please call your primary care clinic.  If you do not have a primary care provider, please call [phone] and they will assist you.        Care EveryWhere ID     This is your Care EveryWhere ID. This could be used by other organizations to access your [place] medical records  [id number]        Your Vitals Were     Pulse Temperature Height Pulse Oximetry BMI (Body Mass Index)       66 98  F (36.7  C) (Oral) 6' 8\" (2.032 m) 98% 33.4 kg/m2        Blood Pressure from Last 3 Encounters:   10/07/17 126/78   06/19/17 136/85   05/26/17 143/85    Weight from Last 3 Encounters:   10/07/17 (!) 304 lb (137.9 kg)   06/19/17 (!) 311 lb 12.8 oz (141.4 kg)   05/26/17 (!) 317 lb (143.8 kg)              Today, you had the following     No orders found for display         Today's Medication Changes          These changes are accurate as of: 10/7/17  1:45 PM.  If you have any questions, ask your nurse or doctor.               Start taking these medicines.        Dose/Directions    fluticasone 50 MCG/ACT spray   Commonly known as:  FLONASE   Used for:  Post-nasal drip   Started by:  Genna Haddad MD        Dose:  1-2 spray   Spray 1-2 sprays into both nostrils daily   Quantity:  1 Bottle   Refills:  0         These medicines have changed or have updated prescriptions.        Dose/Directions    omeprazole 40 MG capsule   Commonly known as:  priLOSEC   This may have changed:    - medication strength  - how much to take  - additional instructions   Used for:  Gastroesophageal reflux disease without esophagitis   Changed by:  Genna Haddad MD        Dose:  40 mg   Take 1 capsule (40 mg) by mouth daily Take 30-60 minutes " before a meal.   Quantity:  30 capsule   Refills:  0         Stop taking these medicines if you haven't already. Please contact your care team if you have questions.     fluticasone 27.5 MCG/SPRAY spray   Commonly known as:  VERAMYST   Stopped by:  Genna Haddad MD           rosuvastatin 20 MG tablet   Commonly known as:  CRESTOR   Stopped by:  Genna Haddad MD                Where to get your medicines      These medications were sent to [facility] [address] [place] [address] [address] AT [place] [address] [address], [facility] [address]     Phone:  [phone]     fluticasone 50 MCG/ACT spray         Some of these will need a paper prescription and others can be bought over the counter.  Ask your nurse if you have questions.     Bring a paper prescription for each of these medications     omeprazole 40 MG capsule                Primary Care Provider Office Phone # Fax #    Liliam Weinberg [phone]       [address] [address]        Equal Access to Services     [facility]: Hadii adeline bajwa hadasho Socaty, waaxda luqadaha, qaybta kaalmada adeenoch, nichole sanchez. So [facility] [phone].    ATENCIÓN: Si habla español, tiene a hawk disposición servicios gratuitos de asistencia lingüística. [facility] [phone].    We comply with applicable federal civil rights laws and [place] laws. We do not discriminate on the basis of race, color, national origin, age, disability, sex, sexual orientation, or gender identity.            Thank you!     Thank you for choosing [facility]  for your care. Our goal is always to provide you with excellent care. Hearing back from our patients is one way we can continue to improve our services. Please take a few minutes to complete the written survey that you may receive in the mail after your visit with us. Thank you!             Your  Updated Medication List - Protect others around you: Learn how to safely use, store and throw away your medicines at www.disposemymeds.org.          This list is accurate as of: 10/7/17  1:45 PM.  Always use your most recent med list.                   Brand Name Dispense Instructions for use Diagnosis    amLODIPine 5 MG tablet    NORVASC    90 tablet    Take 1 tablet (5 mg) by mouth daily    Essential hypertension       ASPIRIN LOW DOSE 81 MG tablet   Generic drug:  aspirin      Take 1 tablet by mouth daily.        * atorvastatin 20 MG tablet    LIPITOR    90 tablet    Take 1 tablet (20 mg) by mouth daily    Hyperlipidemia LDL goal <130       * atorvastatin 40 MG tablet    LIPITOR    90 tablet    Take 1 tablet (40 mg) by mouth daily    Essential hypertension       Blood Glucose Monitoring Suppl Génesis      Use as directed        enalapril 20 MG tablet    VASOTEC    180 tablet    Take 2 tablets (40 mg) by mouth daily    Benign essential hypertension       fluticasone 50 MCG/ACT spray    FLONASE    1 Bottle    Spray 1-2 sprays into both nostrils daily    Post-nasal drip       guaiFENesin 600 MG 12 hr tablet    MUCINEX     Take 1,200 mg by mouth 2 times daily        hydrochlorothiazide 25 MG tablet    HYDRODIURIL    90 tablet    Take 1 tablet (25 mg) by mouth daily    Benign essential hypertension       loratadine 10 MG tablet    CLARITIN     Take 10 mg by mouth daily        omeprazole 40 MG capsule    priLOSEC    30 capsule    Take 1 capsule (40 mg) by mouth daily Take 30-60 minutes before a meal.    Gastroesophageal reflux disease without esophagitis       order for DME     1 Units    Blood pressure cuff    Benign essential hypertension       ranitidine 150 MG tablet    ZANTAC    180 tablet    Take 1 tablet (150 mg) by mouth 2 times daily    Gastroesophageal reflux disease without esophagitis       triamcinolone 0.1 % cream    KENALOG    30 g    Apply sparingly to affected area three times daily for 14 days.     Chronic eczema       * Notice:  This list has 2 medication(s) that are the same as other medications prescribed for you. Read the directions carefully, and ask your doctor or other care provider to review them with you.

## 2017-10-07 NOTE — PATIENT INSTRUCTIONS
Restart flonase  Restart omeprazole at 40 mg day    Recheck with primary clinic towards end of next week.      Tips to Control Acid Reflux    To control acid reflux, you ll need to make some basic diet and lifestyle changes. The simple steps outlined below may be all you ll need to ease discomfort.  Watch what you eat    Avoid fatty foods and spicy foods.    Eat fewer acidic foods, such as citrus and tomato-based foods. These can increase symptoms.    Limit drinking alcohol, caffeine, and fizzy beverages. All increase acid reflux.    Try limiting chocolate, peppermint, and spearmint. These can worsen acid reflux in some people.  Watch when you eat    Avoid lying down for 3 hours after eating.    Do not snack before going to bed.  Raise your head  Raising your head and upper body by 4 to 6 inches helps limit reflux when you re lying down. Put blocks under the head of your bed frame to raise it.  Other changes    Lose weight, if you need to    Don t exercise near bedtime    Avoid tight-fitting clothes    Limit aspirin and ibuprofen    Stop smoking   Date Last Reviewed: 7/1/2016 2000-2017 [employer]. [address] [place] [address]. All rights reserved. This information is not intended as a substitute for professional medical care. Always follow your healthcare professional's instructions.        How Acid Reflux Affects Your Throat    Do you have to clear your throat or cough often? Are you hoarse? Do you have trouble swallowing? If you have these or other throat symptoms, you may have acid reflux. This occurs when stomach acid flows back up and irritates your throat.  Why you have throat symptoms  There are muscles (esophageal sphincters) at both ends of the tube that carries food to your stomach (the esophagus). These muscles relax to let food pass. Then they tighten to keep stomach acid down. When the lower esophageal sphincter (LES) doesn t tighten enough, acid can flow back (reflux)  from your stomach into your esophagus. This may cause heartburn. In some cases the upper esophageal sphincter (UES) also doesn t work well. Then acid can travel higher and enter your throat (pharynx). In many cases, this causes throat symptoms.  Common throat symptoms    Need to clear your throat often    Feeling like you re choking    Long-term (chronic) cough    Hoarseness    Trouble swallowing    Feel like you have a lump in your throat    Sour or acid taste    Sore throat that keeps coming back   Date Last Reviewed: 7/1/2016 2000-2017 [employer]. [address] [place] [address]. All rights reserved. This information is not intended as a substitute for professional medical care. Always follow your healthcare professional's instructions.

## 2017-10-07 NOTE — NURSING NOTE
"Doug Nieves;   Chief Complaint   Patient presents with     Sinus Problem     chronic rhinitis, now feels that allergies have flared this - has been using otc meds - sneezing/coughing fits, flu shot given last week, no sinus pressure      Urgent Care     Initial /78 (BP Location: Left arm, Patient Position: Chair, Cuff Size: Adult Large)  Pulse 66  Temp 98  F (36.7  C) (Oral)  Ht 6' 8\" (2.032 m)  Wt (!) 304 lb (137.9 kg)  SpO2 98%  BMI 33.4 kg/m2 Estimated body mass index is 33.4 kg/(m^2) as calculated from the following:    Height as of this encounter: 6' 8\" (2.032 m).    Weight as of this encounter: 304 lb (137.9 kg)..  BP completed using cuff size large.  Geri Ingram R.N.  "

## 2017-10-07 NOTE — PROGRESS NOTES
SUBJECTIVE:   Doug Nieves is a 71 year old male presenting with a chief complaint of   Chief Complaint   Patient presents with     Sinus Problem     chronic rhinitis, now feels that allergies have flared this - has been using otc meds - sneezing/coughing fits, flu shot given last week, no sinus pressure      Urgent Care   .    Onset of symptoms was chronic  Current and Associated symptoms: concerned throat symptoms affecting eating  Treatment measures tried include Claritin muscinex.  Predisposing factors include HX of chronic sinusitis.      Review of Systems   Constitutional: Negative for fever.   HENT: Positive for congestion and sore throat. Negative for ear pain.         Irritated throat , coughing fits causes acid taste, mucous in throat.   Gastrointestinal:        On ranitidine. switched to omeprazole then back to ranitidine         Past Medical History:   Diagnosis Date     Gastro-oesophageal reflux disease      Hypertension      Quadriceps tendon rupture 3/16/2013     Current Outpatient Prescriptions   Medication Sig Dispense Refill     loratadine (CLARITIN) 10 MG tablet Take 10 mg by mouth daily       guaiFENesin (MUCINEX) 600 MG 12 hr tablet Take 1,200 mg by mouth 2 times daily       omeprazole (PRILOSEC) 40 MG capsule Take 1 capsule (40 mg) by mouth daily Take 30-60 minutes before a meal. 30 capsule 0     fluticasone (FLONASE) 50 MCG/ACT spray Spray 1-2 sprays into both nostrils daily 1 Bottle 0     enalapril (VASOTEC) 20 MG tablet Take 2 tablets (40 mg) by mouth daily 180 tablet 1     hydrochlorothiazide (HYDRODIURIL) 25 MG tablet Take 1 tablet (25 mg) by mouth daily 90 tablet 3     ranitidine (ZANTAC) 150 MG tablet Take 1 tablet (150 mg) by mouth 2 times daily 180 tablet 1     atorvastatin (LIPITOR) 40 MG tablet Take 1 tablet (40 mg) by mouth daily 90 tablet 3     amLODIPine (NORVASC) 5 MG tablet Take 1 tablet (5 mg) by mouth daily 90 tablet 3     atorvastatin (LIPITOR) 20 MG tablet Take 1 tablet  "(20 mg) by mouth daily 90 tablet 1     aspirin (ASPIRIN LOW DOSE) 81 MG tablet Take 1 tablet by mouth daily.       order for DME Blood pressure cuff 1 Units 0     triamcinolone (KENALOG) 0.1 % cream Apply sparingly to affected area three times daily for 14 days. 30 g 0     Blood Glucose Monitoring Suppl GUERDA Use as directed       Social History   Substance Use Topics     Smoking status: Former Smoker     Quit date: 3/18/1980     Smokeless tobacco: Not on file      Comment: Quit many years ago     Alcohol use Yes      Comment: occasional        OBJECTIVE  /78 (BP Location: Left arm, Patient Position: Chair, Cuff Size: Adult Large)  Pulse 66  Temp 98  F (36.7  C) (Oral)  Ht 6' 8\" (2.032 m)  Wt (!) 304 lb (137.9 kg)  SpO2 98%  BMI 33.4 kg/m2    Physical Exam   Constitutional: He is well-developed, well-nourished, and in no distress.   HENT:   Right Ear: External ear normal.   Left Ear: External ear normal.   Mouth/Throat: Oropharynx is clear and moist.   tympanic membrane bilateral clear   Cardiovascular: Normal rate, regular rhythm and normal heart sounds.    Pulmonary/Chest: Effort normal and breath sounds normal.   Abdominal: Soft. Bowel sounds are normal. There is no tenderness.       Labs:  No results found for this or any previous visit (from the past 24 hour(s)).    X-Ray was not done.    ASSESSMENT:      ICD-10-CM    1. Post-nasal drip R09.82 fluticasone (FLONASE) 50 MCG/ACT spray   2. Gastroesophageal reflux disease without esophagitis K21.9 omeprazole (PRILOSEC) 40 MG capsule        Medical Decision Making:  Patient Instructions     Restart flonase  Restart omeprazole at 40 mg day    Recheck with primary clinic towards end of next week.      Tips to Control Acid Reflux    To control acid reflux, you ll need to make some basic diet and lifestyle changes. The simple steps outlined below may be all you ll need to ease discomfort.  Watch what you eat    Avoid fatty foods and spicy foods.    Eat fewer " acidic foods, such as citrus and tomato-based foods. These can increase symptoms.    Limit drinking alcohol, caffeine, and fizzy beverages. All increase acid reflux.    Try limiting chocolate, peppermint, and spearmint. These can worsen acid reflux in some people.  Watch when you eat    Avoid lying down for 3 hours after eating.    Do not snack before going to bed.  Raise your head  Raising your head and upper body by 4 to 6 inches helps limit reflux when you re lying down. Put blocks under the head of your bed frame to raise it.  Other changes    Lose weight, if you need to    Don t exercise near bedtime    Avoid tight-fitting clothes    Limit aspirin and ibuprofen    Stop smoking   Date Last Reviewed: 7/1/2016 2000-2017 [employer]. [address] [place] [address], [place] [address]. All rights reserved. This information is not intended as a substitute for professional medical care. Always follow your healthcare professional's instructions.        How Acid Reflux Affects Your Throat    Do you have to clear your throat or cough often? Are you hoarse? Do you have trouble swallowing? If you have these or other throat symptoms, you may have acid reflux. This occurs when stomach acid flows back up and irritates your throat.  Why you have throat symptoms  There are muscles (esophageal sphincters) at both ends of the tube that carries food to your stomach (the esophagus). These muscles relax to let food pass. Then they tighten to keep stomach acid down. When the lower esophageal sphincter (LES) doesn t tighten enough, acid can flow back (reflux) from your stomach into your esophagus. This may cause heartburn. In some cases the upper esophageal sphincter (UES) also doesn t work well. Then acid can travel higher and enter your throat (pharynx). In many cases, this causes throat symptoms.  Common throat symptoms    Need to clear your throat often    Feeling like you re choking    Long-term (chronic)  cough    Hoarseness    Trouble swallowing    Feel like you have a lump in your throat    Sour or acid taste    Sore throat that keeps coming back   Date Last Reviewed: 7/1/2016 2000-2017 [employer]. [address] [place] [address], [place] [address]. All rights reserved. This information is not intended as a substitute for professional medical care. Always follow your healthcare professional's instructions.

## 2017-10-09 NOTE — TELEPHONE ENCOUNTER
PA Not Needed    Medication: fluticasone (VERAMYST) 27.5 MCG/SPRAY spray-Not Needed-  Insurance Company:    Pharm[facility] the Rx[facility] [address] [place] [address] [address] AT [facility]  Filling Pharmacy Phone: [phone]  Filling Pharmacy Fax:    Start Date: 10/9/2017    Dr. Haddad changed the patient's medication to Flonase and that went through the patient's insurance just fine. Prior authorization is no longer needed.

## 2017-10-09 NOTE — TELEPHONE ENCOUNTER
PA Initiation    Medication: fluticasone (VERAMYST) 27.5 MCG/SPRAY spray-Initiated-  Insurance Company:    Pharmacy Filling the Rx: [facility] [address] [place] [address] [address] AT [facility]  Filling Pharmacy Phone: [phone]  Filling Pharmacy Fax:    Start Date: 10/9/2017

## 2017-10-17 DIAGNOSIS — K21.9 GASTROESOPHAGEAL REFLUX DISEASE WITHOUT ESOPHAGITIS: ICD-10-CM

## 2017-10-17 DIAGNOSIS — I10 BENIGN ESSENTIAL HYPERTENSION: ICD-10-CM

## 2017-10-17 RX ORDER — OMEPRAZOLE 40 MG/1
40 CAPSULE, DELAYED RELEASE ORAL DAILY
Qty: 30 CAPSULE | Refills: 0 | Status: SHIPPED | OUTPATIENT
Start: 2017-10-17 | End: 2017-11-18

## 2017-10-17 NOTE — TELEPHONE ENCOUNTER
Request for medication refill:   Omeprazole 20mg    Date of last visit at clinic: 06/19/2017    Please complete refill if appropriate and CLOSE ENCOUNTER.    Closing the encounter signifies the refill is complete.    If refill has been denied, please complete the smart phrase .smirefuse and route it to the [facility] RN TRIAGE pool to inform the patient and the pharmacy.    Francesca Juárez, [facility]

## 2017-11-13 DIAGNOSIS — I10 ESSENTIAL HYPERTENSION: ICD-10-CM

## 2017-11-13 RX ORDER — AMLODIPINE BESYLATE 5 MG/1
5 TABLET ORAL DAILY
Qty: 90 TABLET | Refills: 3 | Status: SHIPPED | OUTPATIENT
Start: 2017-11-13 | End: 2018-11-13

## 2017-11-13 NOTE — TELEPHONE ENCOUNTER
Request for medication refill:    Date of last visit at clinic: 06/19/2017    Please complete refill if appropriate and CLOSE ENCOUNTER.    Closing the encounter signifies the refill is complete.    If refill has been denied, please complete the smart phrase .smirefuse and route it to the [facility] RN TRIAGE pool to inform the patient and the pharmacy.    Yovani SCHWARZ, Yovani, CMA

## 2017-11-17 ENCOUNTER — OFFICE VISIT (OUTPATIENT)
Dept: FAMILY MEDICINE | Facility: CLINIC | Age: 72
End: 2017-11-17

## 2017-11-17 VITALS
OXYGEN SATURATION: 97 % | HEART RATE: 59 BPM | DIASTOLIC BLOOD PRESSURE: 86 MMHG | SYSTOLIC BLOOD PRESSURE: 133 MMHG | TEMPERATURE: 97.5 F | BODY MASS INDEX: 34.43 KG/M2 | RESPIRATION RATE: 18 BRPM | WEIGHT: 313.4 LBS

## 2017-11-17 DIAGNOSIS — K21.9 GASTROESOPHAGEAL REFLUX DISEASE WITHOUT ESOPHAGITIS: ICD-10-CM

## 2017-11-17 DIAGNOSIS — I10 ESSENTIAL HYPERTENSION: Primary | ICD-10-CM

## 2017-11-17 DIAGNOSIS — J30.81 ALLERGIC RHINITIS DUE TO ANIMAL HAIR AND DANDER, UNSPECIFIED CHRONICITY: ICD-10-CM

## 2017-11-17 DIAGNOSIS — R17 TOTAL BILIRUBIN, ELEVATED: ICD-10-CM

## 2017-11-17 LAB
ALBUMIN SERPL-MCNC: 4.2 MG/DL (ref 3.5–4.7)
ALP SERPL-CCNC: 63.7 U/L (ref 31.7–110.7)
ALT SERPL-CCNC: 37.6 U/L (ref 0–45)
AST SERPL-CCNC: 20.1 U/L (ref 0–55)
BILIRUB SERPL-MCNC: 1.7 MG/DL (ref 0.2–1.3)
BILIRUBIN UR: NEGATIVE
BLOOD UR: ABNORMAL
BUN SERPL-MCNC: 15.9 MG/DL (ref 7–21)
CALCIUM SERPL-MCNC: 9.1 MG/DL (ref 8.5–10.1)
CHLORIDE SERPLBLD-SCNC: 100.8 MMOL/L (ref 98–110)
CHOLEST SERPL-MCNC: 104.7 MG/DL (ref 0–200)
CHOLEST/HDLC SERPL: 3 {RATIO} (ref 0–5)
CO2 SERPL-SCNC: 25.4 MMOL/L (ref 20–32)
CREAT SERPL-MCNC: 0.7 MG/DL (ref 0.7–1.3)
CREAT UR-MCNC: 116 MG/DL
GFR SERPL CREATININE-BSD FRML MDRD: >90 ML/MIN/1.7 M2
GLUCOSE SERPL-MCNC: 145.5 MG'DL (ref 70–99)
GLUCOSE URINE: NEGATIVE
HBA1C MFR BLD: 5.8 % (ref 4.1–5.7)
HDLC SERPL-MCNC: 35 MG/DL
KETONES UR QL: NEGATIVE
LDLC SERPL CALC-MCNC: 50 MG/DL (ref 0–129)
LEUKOCYTE ESTERASE UR: NEGATIVE
MICROALBUMIN UR-MCNC: 55 MG/L
MICROALBUMIN/CREAT UR: 47.76 MG/G CR (ref 0–17)
NITRITE UR QL STRIP: NEGATIVE
PH UR STRIP: 6 [PH] (ref 5–7)
POTASSIUM SERPL-SCNC: 3.9 MMOL/DL (ref 3.3–4.5)
PROT SERPL-MCNC: 6.8 G/DL (ref 6.8–8.8)
PROTEIN UR: NEGATIVE
SODIUM SERPL-SCNC: 133.3 MMOL/L (ref 132.6–141.4)
SP GR UR STRIP: 1.01
TRIGL SERPL-MCNC: 99.4 MG/DL (ref 0–150)
UROBILINOGEN UR STRIP-ACNC: ABNORMAL
VLDL CHOLESTEROL: 19.9 MG/DL (ref 7–32)

## 2017-11-17 RX ORDER — FLUTICASONE PROPIONATE 50 MCG
1-2 SPRAY, SUSPENSION (ML) NASAL DAILY
Qty: 1 BOTTLE | Refills: 11 | Status: SHIPPED | OUTPATIENT
Start: 2017-11-17 | End: 2018-02-19

## 2017-11-17 RX ORDER — LORATADINE 10 MG/1
10 TABLET ORAL DAILY
Qty: 90 TABLET | Refills: 3 | Status: SHIPPED | OUTPATIENT
Start: 2017-11-17 | End: 2018-02-12

## 2017-11-17 RX ORDER — IPRATROPIUM BROMIDE 42 UG/1
2 SPRAY, METERED NASAL 4 TIMES DAILY PRN
Qty: 3 BOX | Refills: 3 | Status: SHIPPED | OUTPATIENT
Start: 2017-11-17 | End: 2018-09-20

## 2017-11-17 NOTE — MR AVS SNAPSHOT
"              After Visit Summary   11/17/2017    Doug Nieves    MRN: [id number]           Patient Information     Date Of Birth          1945        Visit Information        Provider Department      11/17/2017 8:00 AM Liliam Weinberg MD [facility]        Today's Diagnoses     Essential hypertension    -  1    Gastroesophageal reflux disease without esophagitis        Allergic rhinitis due to animal hair and dander, unspecified chronicity          Care Instructions    Here is the plan from today's visit    1. Essential hypertension  Controlled!!!!  If you start having more dizziness, get back to me, and we might drop the Amlodipine  - Comprehensive Metabolic Panel ([facility])  - Lipid Cascade ([facility])  - Albumin Random Urine Quantitative with Creat Ratio  - Urinalysis (UA) ([facility])  - Hemoglobin A1c ([facility])    2. Gastroesophageal reflux disease without esophagitis  Now continue to take the Omeprazole daily for the next 3 months every day.  If during that time, you still have symptoms, then we do an EGD.   If you start doing better, you could try switching to Zantac (Ranitidine) twice a day BUT if your symptoms come back, go to the Omeprazole again. Goal - NOT to have reflux symptoms.   - omeprazole (PRILOSEC) 20 MG CR capsule; Take 1 capsule (20 mg) by mouth daily  Dispense: 90 capsule; Refill: 3    3. Allergic rhinitis due to animal hair and dander, unspecified chronicity  Keep taking the Flonase - IT IS OK to take it regularly.   Could add Claritin (Loratidine) daily to see if that helps too.    I am adding Atrovent, nasal spray that you use as needed for \"dripping\" but for sure, take before bed.   - fluticasone (FLONASE) 50 MCG/ACT spray; Spray 1-2 sprays into both nostrils daily  Dispense: 1 Bottle; Refill: 11  - ipratropium (ATROVENT) 0.06 % spray; Spray 2 sprays into both nostrils 4 times daily as needed for rhinitis  Dispense: 3 Box; Refill: 3      COME back to talk about " your prostate    Follow up plan      Thank you for coming to [facility] today.  Lab Testing:  **If you had lab testing today and your results are reassuring or normal they will be mailed to you or sent through [employer] within 7 days.   **If the lab tests need quick action we will call you with the results.  The phone number we will call with results is # [phone] (home) . If this is not the best number please call our clinic and change the number.  Medication Refills:  If you need any refills please call your pharmacy and they will contact us.   If you need to  your refill at a new pharmacy, please contact the new pharmacy directly. The new pharmacy will help you get your medications transferred faster.   Scheduling:  If you have any concerns about today's visit or wish to schedule another appointment please call our office during normal business hours [phone] (8-5:00 M-F)  If a referral was made to a [facility] and you don't get a call from central scheduling please call [phone].  If a Mammogram was ordered for you at [facility] call [phone] to schedule or change your appointment.  If you had an XRay/CT/Ultrasound/MRI ordered the number is [phone] to schedule or change your radiology appointment.   Medical Concerns:  If you have urgent medical concerns please call [phone] at any time of the day.          Follow-ups after your visit        Who to contact     Please call your clinic at [phone] to:    Ask questions about your health    Make or cancel appointments    Discuss your medicines    Learn about your test results    Speak to your doctor   If you have compliments or concerns about an experience at your clinic, or if you wish to file a complaint, please contact [facility] Patient Relations at [phone] or email us at [email or web address]         Additional Information About Your Visit         [employer] Information     [employer] gives you secure access to your electronic health record. If you see a primary care provider, you can also send messages to your care team and make appointments. If you have questions, please call your primary care clinic.  If you do not have a primary care provider, please call [phone] and they will assist you.      [employer] is an electronic gateway that provides easy, online access to your medical records. With [employer], you can request a clinic appointment, read your test results, renew a prescription or communicate with your care team.     To access your existing account, please contact your [facility] or call [phone] for assistance.        Care EveryWhere ID     This is your Care EveryWhere ID. This could be used by other organizations to access your [facility] medical records  [id number]        Your Vitals Were     Pulse Temperature Respirations Pulse Oximetry BMI (Body Mass Index)       59 97.5  F (36.4  C) (Oral) 18 97% 34.43 kg/m2        Blood Pressure from Last 3 Encounters:   11/17/17 133/86   10/07/17 126/78   06/19/17 136/85    Weight from Last 3 Encounters:   11/17/17 (!) 313 lb 6.4 oz (142.2 kg)   10/07/17 (!) 304 lb (137.9 kg)   06/19/17 (!) 311 lb 12.8 oz (141.4 kg)              We Performed the Following     Albumin Random Urine Quantitative with Creat Ratio     Comprehensive Metabolic Panel ([facility])     Hemoglobin A1c ([facility])     Lipid Cascade ([facility])     Urinalysis (UA) ([facility])          Today's Medication Changes          These changes are accurate as of: 11/17/17  8:46 AM.  If you have any questions, ask your nurse or doctor.               Start taking these medicines.        Dose/Directions    ipratropium 0.06 % spray   Commonly known as:  ATROVENT   Used for:  Allergic rhinitis due to animal hair and dander, unspecified chronicity   Started by:  Liliam Weinberg MD        Dose:  2 spray   Spray 2 sprays into  both nostrils 4 times daily as needed for rhinitis   Quantity:  3 Box   Refills:  3         These medicines have changed or have updated prescriptions.        Dose/Directions    * fluticasone 50 MCG/ACT spray   Commonly known as:  FLONASE   This may have changed:  Another medication with the same name was added. Make sure you understand how and when to take each.   Used for:  Post-nasal drip   Changed by:  Genna Haddad MD        Dose:  1-2 spray   Spray 1-2 sprays into both nostrils daily   Quantity:  1 Bottle   Refills:  0       * fluticasone 50 MCG/ACT spray   Commonly known as:  FLONASE   This may have changed:  You were already taking a medication with the same name, and this prescription was added. Make sure you understand how and when to take each.   Used for:  Allergic rhinitis due to animal hair and dander, unspecified chronicity   Changed by:  Liliam Weinberg MD        Dose:  1-2 spray   Spray 1-2 sprays into both nostrils daily   Quantity:  1 Bottle   Refills:  11       * loratadine 10 MG tablet   Commonly known as:  CLARITIN   This may have changed:  Another medication with the same name was added. Make sure you understand how and when to take each.   Changed by:  Genna Haddad MD        Dose:  10 mg   Take 10 mg by mouth daily   Refills:  0       * loratadine 10 MG tablet   Commonly known as:  CLARITIN   This may have changed:  You were already taking a medication with the same name, and this prescription was added. Make sure you understand how and when to take each.   Used for:  Allergic rhinitis due to animal hair and dander, unspecified chronicity   Changed by:  Liliam Weinberg MD        Dose:  10 mg   Take 1 tablet (10 mg) by mouth daily   Quantity:  90 tablet   Refills:  3       * omeprazole 40 MG capsule   Commonly known as:  priLOSEC   This may have changed:  Another medication with the same name was added. Make sure you understand how and when to take each.   Used for:   Gastroesophageal reflux disease without esophagitis   Changed by:  Liliam Weinberg MD        Dose:  40 mg   Take 1 capsule (40 mg) by mouth daily Take 30-60 minutes before a meal.   Quantity:  30 capsule   Refills:  0       * omeprazole 20 MG CR capsule   Commonly known as:  priLOSEC   This may have changed:  You were already taking a medication with the same name, and this prescription was added. Make sure you understand how and when to take each.   Used for:  Gastroesophageal reflux disease without esophagitis   Changed by:  Liliam Weinberg MD        Dose:  20 mg   Take 1 capsule (20 mg) by mouth daily   Quantity:  90 capsule   Refills:  3       * Notice:  This list has 6 medication(s) that are the same as other medications prescribed for you. Read the directions carefully, and ask your doctor or other care provider to review them with you.         Where to get your medicines      These medications were sent to [facility] [address] [place] [address] [address] AT [place] [address] [address], [facility] [address]     Phone:  [phone]     fluticasone 50 MCG/ACT spray    ipratropium 0.06 % spray    loratadine 10 MG tablet    omeprazole 20 MG CR capsule                Primary Care Provider Office Phone # Fax #    Liliam Weinberg [phone]       [address] [address]        Equal Access to Services     BRANDON LOGAN AH: Hadii adeline bajwa hadabilioo Socaty, waaxda luqadaha, qaybta kaalmada adeenoch, nichole sanchez. So [facility] [phone].    ATENCIÓN: Si habla español, tiene a hawk disposición servicios gratuitos de asistencia lingüística. Mina al [phone].    We comply with applicable federal civil rights laws and [place] laws. We do not discriminate on the basis of race, color, national origin, age, disability, sex, sexual orientation, or gender identity.            Thank you!     Thank you for choosing [facility]  for your care. Our goal is always to provide you with excellent care. Hearing back from our patients is one way we can continue to improve our services. Please take a few minutes to complete the written survey that you may receive in the mail after your visit with us. Thank you!             Your Updated Medication List - Protect others around you: Learn how to safely use, store and throw away your medicines at www.disposemymeds.org.          This list is accurate as of: 11/17/17  8:46 AM.  Always use your most recent med list.                   Brand Name Dispense Instructions for use Diagnosis    amLODIPine 5 MG tablet    NORVASC    90 tablet    Take 1 tablet (5 mg) by mouth daily    Essential hypertension       ASPIRIN LOW DOSE 81 MG tablet   Generic drug:  aspirin      Take 1 tablet by mouth daily.        * atorvastatin 20 MG tablet    LIPITOR    90 tablet    Take 1 tablet (20 mg) by mouth daily    Hyperlipidemia LDL goal <130       * atorvastatin 40 MG tablet    LIPITOR    90 tablet    Take 1 tablet (40 mg) by mouth daily    Essential hypertension       Blood Glucose Monitoring Suppl Génesis      Use as directed        enalapril 20 MG tablet    VASOTEC    180 tablet    Take 2 tablets (40 mg) by mouth daily    Benign essential hypertension       * fluticasone 50 MCG/ACT spray    FLONASE    1 Bottle    Spray 1-2 sprays into both nostrils daily    Post-nasal drip       * fluticasone 50 MCG/ACT spray    FLONASE    1 Bottle    Spray 1-2 sprays into both nostrils daily    Allergic rhinitis due to animal hair and dander, unspecified chronicity       guaiFENesin 600 MG 12 hr tablet    MUCINEX     Take 1,200 mg by mouth 2 times daily        hydrochlorothiazide 25 MG tablet    HYDRODIURIL    90 tablet    Take 1 tablet (25 mg) by mouth daily    Benign essential hypertension       ipratropium 0.06 % spray    ATROVENT    3 Box    Spray 2 sprays into both nostrils 4 times daily as needed for rhinitis     Allergic rhinitis due to animal hair and dander, unspecified chronicity       * loratadine 10 MG tablet    CLARITIN     Take 10 mg by mouth daily        * loratadine 10 MG tablet    CLARITIN    90 tablet    Take 1 tablet (10 mg) by mouth daily    Allergic rhinitis due to animal hair and dander, unspecified chronicity       * omeprazole 40 MG capsule    priLOSEC    30 capsule    Take 1 capsule (40 mg) by mouth daily Take 30-60 minutes before a meal.    Gastroesophageal reflux disease without esophagitis       * omeprazole 20 MG CR capsule    priLOSEC    90 capsule    Take 1 capsule (20 mg) by mouth daily    Gastroesophageal reflux disease without esophagitis       order for DME     1 Units    Blood pressure cuff    Benign essential hypertension       ranitidine 150 MG tablet    ZANTAC    180 tablet    Take 1 tablet (150 mg) by mouth 2 times daily    Gastroesophageal reflux disease without esophagitis       triamcinolone 0.1 % cream    KENALOG    30 g    Apply sparingly to affected area three times daily for 14 days.    Chronic eczema       * Notice:  This list has 8 medication(s) that are the same as other medications prescribed for you. Read the directions carefully, and ask your doctor or other care provider to review them with you.

## 2017-11-17 NOTE — PATIENT INSTRUCTIONS
"Here is the plan from today's visit    1. Essential hypertension  Controlled!!!!  If you start having more dizziness, get back to me, and we might drop the Amlodipine  - Comprehensive Metabolic Panel ([facility])  - Lipid Cascade ([facility])  - Albumin Random Urine Quantitative with Creat Ratio  - Urinalysis (UA) ([facility])  - Hemoglobin A1c ([facility])    2. Gastroesophageal reflux disease without esophagitis  Now continue to take the Omeprazole daily for the next 3 months every day.  If during that time, you still have symptoms, then we do an EGD.   If you start doing better, you could try switching to Zantac (Ranitidine) twice a day BUT if your symptoms come back, go to the Omeprazole again. Goal - NOT to have reflux symptoms.   - omeprazole (PRILOSEC) 20 MG CR capsule; Take 1 capsule (20 mg) by mouth daily  Dispense: 90 capsule; Refill: 3    3. Allergic rhinitis due to animal hair and dander, unspecified chronicity  Keep taking the Flonase - IT IS OK to take it regularly.   Could add Claritin (Loratidine) daily to see if that helps too.    I am adding Atrovent, nasal spray that you use as needed for \"dripping\" but for sure, take before bed.   - fluticasone (FLONASE) 50 MCG/ACT spray; Spray 1-2 sprays into both nostrils daily  Dispense: 1 Bottle; Refill: 11  - ipratropium (ATROVENT) 0.06 % spray; Spray 2 sprays into both nostrils 4 times daily as needed for rhinitis  Dispense: 3 Box; Refill: 3      COME back to talk about your prostate    Follow up plan      Thank you for coming to [facility] today.  Lab Testing:  **If you had lab testing today and your results are reassuring or normal they will be mailed to you or sent through [employer] within 7 days.   **If the lab tests need quick action we will call you with the results.  The phone number we will call with results is # [phone] (home) . If this is not the best number please call our clinic and change the number.  Medication Refills:  If you need any " refills please call your pharmacy and they will contact us.   If you need to  your refill at a new pharmacy, please contact the new pharmacy directly. The new pharmacy will help you get your medications transferred faster.   Scheduling:  If you have any concerns about today's visit or wish to schedule another appointment please call our office during normal business hours [phone] (8-5:00 M-F)  If a referral was made to a [facility] and you don't get a call from central scheduling please call [phone].  If a Mammogram was ordered for you at [facility] call [phone] to schedule or change your appointment.  If you had an XRay/CT/Ultrasound/MRI ordered the number is [phone] to schedule or change your radiology appointment.   Medical Concerns:  If you have urgent medical concerns please call [phone] at any time of the day.

## 2017-11-17 NOTE — TELEPHONE ENCOUNTER
Request for medication refill: omeprazole 40mg    Date of last visit at clinic: 06/19/2017    Please complete refill if appropriate and CLOSE ENCOUNTER.    Closing the encounter signifies the refill is complete.    If refill has been denied, please complete the smart phrase .smirefuse and route it to the [facility] RN TRIAGE pool to inform the patient and the pharmacy.    Francesca Juárez, [facility]

## 2017-11-17 NOTE — PROGRESS NOTES
8:21 AM  8:46 AM          HPI:       Doug Nieves is a 72 year old who presents for the following  Patient presents with:  Knee Pain: Left knee, increased pain, injection 1 year previous  RECHECK: Blood pressure follow up, reflux, throat pain,   Nose Problem: congestion,ongoing, some improvement noticed,2 months      Hypertension Follow-up      Outpatient blood pressures are being checked at home.  Results are 107//85.    Chest Pain? :No     Low Salt Diet: no added salt    Daily NSAID Use?No     Did patient take their HTN pills today/last night as usual?  Yes    Has been a bit lightheaded from time to time - never when exercise.       GERD - Throat was so sensitive - that was eating less (lost weight) - happened over a week, would put food in his mouth and so sensitive in his throat that he would cough.  Once on the double dose of omeprazole and then much better in 2 - 3 days.  Had tried to wean off.  The visit with Urgent care was 1 month ago.  After about a week went back down to the 20 mg daily of omeprazole.      Throat congestion - has to clear his throat all the time. ? Something he has to live with it. Comes and goes. ? Part of the appartment.  Nose dried up but wakes up in the am and has phlegm.  Using the flonase - stopped that because his nose was better - when on the flonase, his throat clearing is better but it never goes away.  Has not noticed any correlation with foods - does drink a lot of milk.      Denies SOB or cough.      Left knee is limiting his movement again and has been a year since he had an injection. Hoping to have one today    Adherence and Exercise  Medication side effects: yes: lightheadedness  How often is a medication missed? Never  Exercise: 2-3 days/week for an average of greater than 60 minutes          Problem, Medication and Allergy Lists were reviewed and are current.  Patient is an established patient of this clinic.         Review of Systems:   Review of Systems           Physical Exam:   Patient Vitals for the past 24 hrs:   BP Temp Temp src Pulse Resp SpO2 Weight   11/17/17 0811 133/86 97.5  F (36.4  C) Oral 59 18 97 % (!) 313 lb 6.4 oz (142.2 kg)     Body mass index is 34.43 kg/(m^2).  Vitals were reviewed and were normal     Physical Exam   Constitutional: He appears well-developed and well-nourished. No distress.   HENT:   Right Ear: External ear normal.   Left Ear: External ear normal.   Mouth/Throat: Oropharynx is clear and moist.   Small posterior OP area.  No cobblestoning seen    Eyes: Conjunctivae and EOM are normal. Pupils are equal, round, and reactive to light.   Neck: Normal range of motion. Neck supple. No thyromegaly present.   Cardiovascular: Normal rate and regular rhythm.    Murmur heard.  Short 2/6   Pulmonary/Chest: Effort normal and breath sounds normal. No respiratory distress. He has no wheezes. He has no rales.   Abdominal: Soft. Bowel sounds are normal. He exhibits no distension and no mass. There is no tenderness.   Musculoskeletal: He exhibits no edema.   Lymphadenopathy:     He has no cervical adenopathy.   Psychiatric: He has a normal mood and affect. Thought content normal.         Results:       Assessment and Plan     Doug was seen today for knee pain, recheck and nose problem.    Diagnoses and all orders for this visit:    Essential hypertension - controlled. Continue meds. Has at times lower BPs and says at times is dizzy. About 11/15 of his BPs in the last month were lower than 130/80. None below 100. Will keep on current meds but if notes that he has more dizziness, then could drop the amlodipine.   -     Comprehensive Metabolic Panel ([facility])  -     Lipid Cascade ([facility])  -     Albumin Random Urine Quantitative with Creat Ratio  -     Urinalysis (UA) ([facility])  -     Hemoglobin A1c ([facility])    Gastroesophageal reflux disease without esophagitis - likely having more reflux than he knows. Had EGD about 10 years ago and was nl. Will  return to regular PPI use and see if that doesn't help his throat. If it doesn't, then he needs EGD  -     omeprazole (PRILOSEC) 20 MG CR capsule; Take 1 capsule (20 mg) by mouth daily    Allergic rhinitis due to animal hair and dander, unspecified chronicity - continue flonase (helps his throat clearing) but will add Atrovent to see if that doesn't help more. Consider using it at night to decrease drainage throughout the night. Do not want to dry him out due to BPH concerns. Will add loratidine as well.   -     fluticasone (FLONASE) 50 MCG/ACT spray; Spray 1-2 sprays into both nostrils daily  -     ipratropium (ATROVENT) 0.06 % spray; Spray 2 sprays into both nostrils 4 times daily as needed for rhinitis  -     loratadine (CLARITIN) 10 MG tablet; Take 1 tablet (10 mg) by mouth daily    Return to discuss prostate (getting up 3 times at night) and also to inject his left knee.    There are no discontinued medications.  Options for treatment and follow-up care were reviewed with the patient. Doug Nieves  engaged in the decision making process and verbalized understanding of the options discussed and agreed with the final plan.    Liliam Weinberg MD    Total time - 25 min with more than half spent counseling on optinos for his GERD, his throat clearing

## 2017-11-18 ENCOUNTER — MYC MEDICAL ADVICE (OUTPATIENT)
Dept: FAMILY MEDICINE | Facility: CLINIC | Age: 72
End: 2017-11-18

## 2017-11-18 RX ORDER — OMEPRAZOLE 40 MG/1
40 CAPSULE, DELAYED RELEASE ORAL DAILY
Qty: 30 CAPSULE | Refills: 0 | OUTPATIENT
Start: 2017-11-18

## 2017-11-30 DIAGNOSIS — K21.9 GASTROESOPHAGEAL REFLUX DISEASE WITHOUT ESOPHAGITIS: ICD-10-CM

## 2017-11-30 NOTE — TELEPHONE ENCOUNTER
Request for medication refill: Omeprazole 40mg    Date of last visit at clinic: 11/01/2017    Please complete refill if appropriate and CLOSE ENCOUNTER.    Closing the encounter signifies the refill is complete.    If refill has been denied, please complete the smart phrase .smirefuse and route it to the [facility] RN TRIAGE pool to inform the patient and the pharmacy.    Francesca Juárez, [facility]

## 2018-01-25 DIAGNOSIS — I10 ESSENTIAL HYPERTENSION: ICD-10-CM

## 2018-01-25 RX ORDER — ATORVASTATIN CALCIUM 40 MG/1
40 TABLET, FILM COATED ORAL DAILY
Qty: 90 TABLET | Refills: 3 | Status: SHIPPED | OUTPATIENT
Start: 2018-01-25 | End: 2019-02-04

## 2018-01-25 NOTE — TELEPHONE ENCOUNTER
Request for medication refill:    Date of last visit at clinic: 11-17-17    Please complete refill if appropriate and CLOSE ENCOUNTER.    Closing the encounter signifies the refill is complete.    If refill has been denied, please complete the smart phrase .smirefuse and route it to the [facility] RN TRIAGE pool to inform the patient and the pharmacy.    NIRAV HUNTER, CMA

## 2018-02-12 ENCOUNTER — OFFICE VISIT (OUTPATIENT)
Dept: FAMILY MEDICINE | Facility: CLINIC | Age: 73
End: 2018-02-12
Payer: COMMERCIAL

## 2018-02-12 VITALS
WEIGHT: 307.5 LBS | RESPIRATION RATE: 20 BRPM | SYSTOLIC BLOOD PRESSURE: 134 MMHG | TEMPERATURE: 98.6 F | BODY MASS INDEX: 35.58 KG/M2 | HEIGHT: 78 IN | DIASTOLIC BLOOD PRESSURE: 80 MMHG | HEART RATE: 68 BPM | OXYGEN SATURATION: 98 %

## 2018-02-12 DIAGNOSIS — I10 ESSENTIAL HYPERTENSION: ICD-10-CM

## 2018-02-12 DIAGNOSIS — R42 LIGHTHEADEDNESS: Primary | ICD-10-CM

## 2018-02-12 LAB
ERYTHROCYTE [DISTWIDTH] IN BLOOD BY AUTOMATED COUNT: 14.5 % (ref 10–15)
HCT VFR BLD AUTO: 48.2 % (ref 40–53)
HGB BLD-MCNC: 15.8 G/DL (ref 13.3–17.7)
MCH RBC QN AUTO: 28.5 PG (ref 26.5–33)
MCHC RBC AUTO-ENTMCNC: 32.8 G/DL (ref 31.5–36.5)
MCV RBC AUTO: 87 FL (ref 78–100)
PLATELET # BLD AUTO: 197 10E9/L (ref 150–450)
RBC # BLD AUTO: 5.54 10E12/L (ref 4.4–5.9)
WBC # BLD AUTO: 6.8 10E9/L (ref 4–11)

## 2018-02-12 PROCEDURE — 80048 BASIC METABOLIC PNL TOTAL CA: CPT | Performed by: NURSE PRACTITIONER

## 2018-02-12 PROCEDURE — 36415 COLL VENOUS BLD VENIPUNCTURE: CPT | Performed by: NURSE PRACTITIONER

## 2018-02-12 PROCEDURE — 99214 OFFICE O/P EST MOD 30 MIN: CPT | Performed by: NURSE PRACTITIONER

## 2018-02-12 PROCEDURE — 85027 COMPLETE CBC AUTOMATED: CPT | Performed by: NURSE PRACTITIONER

## 2018-02-12 NOTE — PROGRESS NOTES
SUBJECTIVE:   Doug Nieves is a 72 year old male who presents to clinic today for the following health issues:    Discuss going back on Zantac     Dizziness  Concerns about hydrochlorothiazide     Duration: Started last Thursday (4 days). Planning on going to exercise and got into car and started feeling lightheadedness.     Symptoms have been intermittent since he has been on antihypertensives.  Mainly occurs with position changes - when he stands up after sitting.      Description   Feeling faint:  YES  Feeling like the surroundings are moving: no   Loss of consciousness or falls: no     Intensity:  mild    Accompanying signs and symptoms:   Nausea/vomiting: no   Palpitations: elevated blood pressure. 140/90s pulse was staying consistent 60-65 range  Weakness in arms or legs: no   Vision or speech changes: no   Ringing in ears (Tinnitus): possible tinnitus in left ear  Hearing loss related to dizziness: no   Other (fevers/chills/sweating/dyspnea): none     History (similar episodes/head trauma/previous evaluation/recent bleeding): Very rarely, when started taking BP medications lasted about a week and went away    Precipitating or alleviating factors (new meds/chemicals): Concerns about Hydrochlorothiazide. Sees Dr. Matthew every 6 months   Worse with activity/head movement: no, doesn't seem to be an issues, neck is a little stiff    Therapies tried and outcome: None    Home blood pressure readings are typically between 120-130s/70-80s.  When he has checked his blood pressure when he has these symptoms, it has been a bit higher (in the 150s/90s).     He does have an appointment with his PCP next week.        Problem list and histories reviewed & adjusted, as indicated.  Additional history: as documented        Reviewed and updated as needed this visit by clinical staff       Reviewed and updated as needed this visit by Provider         ROS:  C: NEGATIVE for fever, chills, change in weight  E/M: NEGATIVE for ear,  "mouth and throat problems  R: NEGATIVE for significant cough or SOB  CV: NEGATIVE for chest pain, palpitations or peripheral edema  GI: NEGATIVE for nausea, abdominal pain, heartburn, or change in bowel habits  MUSCULOSKELETAL: NEGATIVE for significant arthralgias or myalgia  NEURO: see HPI  ENDOCRINE: NEGATIVE for temperature intolerance, skin/hair changes    OBJECTIVE:     /80  Pulse 68  Temp 98.6  F (37  C) (Oral)  Resp 20  Ht 6' 8\" (2.032 m)  Wt (!) 307 lb 8 oz (139.5 kg)  SpO2 98%  BMI 33.78 kg/m2  Body mass index is 33.78 kg/(m^2).  GENERAL: healthy, alert and no distress  EYES: Eyes grossly normal to inspection, PERRL and conjunctivae and sclerae normal  HENT: ear canals and TM's normal, nose and mouth without ulcers or lesions  NECK: no adenopathy, no asymmetry, masses, or scars and thyroid normal to palpation  RESP: lungs clear to auscultation - no rales, rhonchi or wheezes  CV: regular rate and rhythm, normal S1 S2, no S3 or S4, no murmur, click or rub, no peripheral edema and peripheral pulses strong  MS: no gross musculoskeletal defects noted, no edema  NEURO: Normal strength and tone, sensory exam grossly normal, mentation intact, cranial nerves 2-12 intact, gait normal including heel/toe/tandem walking, Romberg normal and rapid alternating movements normal  PSYCH: mentation appears normal, affect normal/bright        ASSESSMENT/PLAN:             1. Lightheadedness  Exam is benign and no red flag symptoms.  Discussed staying well-hydrated, changing positions slowly.  Follow up with his PCP next week or sooner if symptoms worsen.  Discussed symptoms that would prompt emergent evaluation.   Labs to rule out anemia, electrolyte imbalance; no evidence of central pathology on exam.   - CBC with platelets  - Basic metabolic panel    2. Essential hypertension  Somewhat labile.  He will follow up with his PCP next week.           Kasey Baker NP  [facility]    "

## 2018-02-12 NOTE — MR AVS SNAPSHOT
After Visit Summary   2/12/2018    Doug Nieves    MRN: [id number]           Patient Information     Date Of Birth          1945        Visit Information        Provider Department      2/12/2018 1:30 PM Kasey Baker NP [facility]        Today's Diagnoses     Lightheadedness    -  1    Essential hypertension           Follow-ups after your visit        Your next 10 appointments already scheduled     Feb 19, 2018 10:20 AM CST   Return Visit with Liliam Weinberg MD   [facility] ([facility])    [facility] [address],  [address]  [place] [address]   [phone]              Who to contact     If you have questions or need follow up information about today's clinic visit or your schedule please contact [facility] directly at [phone].  Normal or non-critical lab and imaging results will be communicated to you by MyChart, letter or phone within 4 business days after the clinic has received the results. If you do not hear from us within 7 days, please contact the clinic through MyChart or phone. If you have a critical or abnormal lab result, we will notify you by phone as soon as possible.  Submit refill requests through [email or web address] or call your pharmacy and they will forward the refill request to us. Please allow 3 business days for your refill to be completed.          Additional Information About Your Visit        MyChart Information     [email or web address] gives you secure access to your electronic health record. If you see a primary care provider, you can also send messages to your care team and make appointments. If you have questions, please call your primary care clinic.  If you do not have a primary care provider, please call [phone] and they will assist you.        Care EveryWhere ID     This is your Care EveryWhere ID. This could be used by other organizations to access your [place] medical records  [id number]       "  Your Vitals Were     Pulse Temperature Respirations Height Pulse Oximetry BMI (Body Mass Index)    68 98.6  F (37  C) (Oral) 20 6' 8\" (2.032 m) 98% 33.78 kg/m2       Blood Pressure from Last 3 Encounters:   02/12/18 134/80   11/17/17 133/86   10/07/17 126/78    Weight from Last 3 Encounters:   02/12/18 (!) 307 lb 8 oz (139.5 kg)   11/17/17 (!) 313 lb 6.4 oz (142.2 kg)   10/07/17 (!) 304 lb (137.9 kg)              We Performed the Following     Basic metabolic panel     CBC with platelets        Primary Care Provider Office Phone # Fax #    Liliam Weinberg [phone]       [address] [address]        Equal Access to Services     BRANDON LOGAN : Hadii aad ku hadasho Socaty, waaxda luqadaha, qaybta kaalmada ely, nichole hahn . So [facility] [phone].    ATENCIÓN: Si habla español, tiene a hawk disposición servicios gratuitos de asistencia lingüística. Mina al [phone].    We comply with applicable federal civil rights laws and [place] laws. We do not discriminate on the basis of race, color, national origin, age, disability, sex, sexual orientation, or gender identity.            Thank you!     Thank you for choosing [facility]  for your care. Our goal is always to provide you with excellent care. Hearing back from our patients is one way we can continue to improve our services. Please take a few minutes to complete the written survey that you may receive in the mail after your visit with us. Thank you!             Your Updated Medication List - Protect others around you: Learn how to safely use, store and throw away your medicines at www.disposemymeds.org.          This list is accurate as of 2/12/18  5:56 PM.  Always use your most recent med list.                   Brand Name Dispense Instructions for use Diagnosis    amLODIPine 5 MG tablet    NORVASC    90 tablet    Take 1 tablet (5 mg) by mouth daily    " Essential hypertension       ASPIRIN LOW DOSE 81 MG tablet   Generic drug:  aspirin      Take 1 tablet by mouth daily.        atorvastatin 40 MG tablet    LIPITOR    90 tablet    Take 1 tablet (40 mg) by mouth daily    Essential hypertension       Blood Glucose Monitoring Suppl Génesis      Use as directed        enalapril 20 MG tablet    VASOTEC    180 tablet    Take 2 tablets (40 mg) by mouth daily    Benign essential hypertension       fluticasone 50 MCG/ACT spray    FLONASE    1 Bottle    Spray 1-2 sprays into both nostrils daily    Allergic rhinitis due to animal hair and dander, unspecified chronicity       guaiFENesin 600 MG 12 hr tablet    MUCINEX     Take 400 mg by mouth 2 times daily        hydrochlorothiazide 25 MG tablet    HYDRODIURIL    90 tablet    Take 1 tablet (25 mg) by mouth daily    Benign essential hypertension       ipratropium 0.06 % spray    ATROVENT    3 Box    Spray 2 sprays into both nostrils 4 times daily as needed for rhinitis    Allergic rhinitis due to animal hair and dander, unspecified chronicity       loratadine 10 MG tablet    CLARITIN     Take 10 mg by mouth daily        omeprazole 20 MG CR capsule    priLOSEC    90 capsule    Take 1 capsule (20 mg) by mouth daily    Gastroesophageal reflux disease without esophagitis       order for DME     1 Units    Blood pressure cuff    Benign essential hypertension       ranitidine 150 MG tablet    ZANTAC    180 tablet    Take 1 tablet (150 mg) by mouth 2 times daily    Gastroesophageal reflux disease without esophagitis       triamcinolone 0.1 % cream    KENALOG    30 g    Apply sparingly to affected area three times daily for 14 days.    Chronic eczema

## 2018-02-12 NOTE — NURSING NOTE
Orthostatic Vitals     Laying down: BP: 122/77 Pulse: 66  Sitting: BP: 137/90 Pulse: 87  Standing: BP: 146/90 Pulse: 78      Lynnette Brown MA

## 2018-02-13 LAB
ANION GAP SERPL CALCULATED.3IONS-SCNC: 6 MMOL/L (ref 3–14)
BUN SERPL-MCNC: 11 MG/DL (ref 7–30)
CALCIUM SERPL-MCNC: 9 MG/DL (ref 8.5–10.1)
CHLORIDE SERPL-SCNC: 102 MMOL/L (ref 94–109)
CO2 SERPL-SCNC: 26 MMOL/L (ref 20–32)
CREAT SERPL-MCNC: 0.92 MG/DL (ref 0.66–1.25)
GFR SERPL CREATININE-BSD FRML MDRD: 81 ML/MIN/1.7M2
GLUCOSE SERPL-MCNC: 131 MG/DL (ref 70–99)
POTASSIUM SERPL-SCNC: 3.9 MMOL/L (ref 3.4–5.3)
SODIUM SERPL-SCNC: 134 MMOL/L (ref 133–144)

## 2018-02-19 ENCOUNTER — OFFICE VISIT (OUTPATIENT)
Dept: FAMILY MEDICINE | Facility: CLINIC | Age: 73
End: 2018-02-19
Payer: COMMERCIAL

## 2018-02-19 VITALS
SYSTOLIC BLOOD PRESSURE: 140 MMHG | WEIGHT: 310.6 LBS | BODY MASS INDEX: 34.12 KG/M2 | DIASTOLIC BLOOD PRESSURE: 86 MMHG | RESPIRATION RATE: 12 BRPM | HEART RATE: 62 BPM | TEMPERATURE: 98.2 F | OXYGEN SATURATION: 97 %

## 2018-02-19 DIAGNOSIS — R42 DIZZINESS: Primary | ICD-10-CM

## 2018-02-19 NOTE — PROGRESS NOTES
HPI:       Doug Nieves is a 72 year old who presents for the following  Patient presents with:  Dizziness      Notice this 2 - 3 hours in the am, every time he gets up. Lasts for less than a minute and then goes away.    Usually feels better after or during  a work out.   For the last week it has been better. Still has a bit of it midmorning after up for a few hours.  Happens when he starts to rise after sitting at breakfast.  Only when he rises.   Gets his pulse to 118 when exercises.     Brings his BPs today - are in the range of 110 - 140/70-85. Pulse always in the 60 - 75 range    Dizziness/Vertigo  Onset: 12 days ago  What brings it on? getting up in the morning    Description:   Do you feel like you are going to faint?:   YES   Does it feel like the surroundings (bed, room) are moving?: No   Have you felt unsteady/off balance?: No   Have you passed out or fallen?: No     Intensity: mild, moderate    Progression of Symptoms:  worsening    Accompanying Signs & Symptoms:  Heart palpitations:No   Nausea, vomiting:No   Weakness in arms or legs: No   Fatigue: No   Vision or speech changes: No   Ringing in ears (Tinnitus):No   Hearing Loss: No     History:   Head trauma/concussion hx:No   Previous similar symptoms: No   Recent bleeding history: No     Precipitating factors:   Worse with activity or head movement?: No   Any new medications (BP?):No   Alcohol/drug abuse/withdrawal: No     Alleviating factors:   Does staying in a fixed position give relief?:YES   Therapies Tried and outcome: Nothing     Acid reflux is much better. Stopped omeprazole a week ago.  Less coffee, less beer.   Afrin really helped. [place]       Problem, Medication and Allergy Lists were reviewed and are current.  Patient is an established patient of this clinic.         Review of Systems:   Review of Systems          Physical Exam:     Patient Vitals for the past 24 hrs:   BP Temp Pulse Resp SpO2  Weight   02/19/18 1041 140/86 - - - - -   02/19/18 1038 (!) 156/95 98.2  F (36.8  C) 62 12 97 % (!) 310 lb 9.6 oz (140.9 kg)     There is no height or weight on file to calculate BMI.  Vital signs normal except blood pressure 156/95  Recheck:140/86     Physical Exam   Cardiovascular: Normal rate, regular rhythm and normal heart sounds.    No murmur heard.  No Carotid bruits   Pulmonary/Chest: Effort normal and breath sounds normal. No respiratory distress.   Musculoskeletal: He exhibits no edema.         Results:     Results from last visit:  Office Visit on 02/12/2018   Component Date Value Ref Range Status     WBC 02/12/2018 6.8  4.0 - 11.0 10e9/L Final     RBC Count 02/12/2018 5.54  4.4 - 5.9 10e12/L Final     Hemoglobin 02/12/2018 15.8  13.3 - 17.7 g/dL Final     Hematocrit 02/12/2018 48.2  40.0 - 53.0 % Final     MCV 02/12/2018 87  78 - 100 fl Final     MCH 02/12/2018 28.5  26.5 - 33.0 pg Final     MCHC 02/12/2018 32.8  31.5 - 36.5 g/dL Final     RDW 02/12/2018 14.5  10.0 - 15.0 % Final     Platelet Count 02/12/2018 197  150 - 450 10e9/L Final     Sodium 02/12/2018 134  133 - 144 mmol/L Final     Potassium 02/12/2018 3.9  3.4 - 5.3 mmol/L Final     Chloride 02/12/2018 102  94 - 109 mmol/L Final     Carbon Dioxide 02/12/2018 26  20 - 32 mmol/L Final     Anion Gap 02/12/2018 6  3 - 14 mmol/L Final     Glucose 02/12/2018 131* 70 - 99 mg/dL Final     Urea Nitrogen 02/12/2018 11  7 - 30 mg/dL Final     Creatinine 02/12/2018 0.92  0.66 - 1.25 mg/dL Final     GFR Estimate 02/12/2018 81  >60 mL/min/1.7m2 Final    Non  GFR Calc     GFR Estimate If Black 02/12/2018 >90  >60 mL/min/1.7m2 Final    African American GFR Calc     Calcium 02/12/2018 9.0  8.5 - 10.1 mg/dL Final     Assessment and Plan     Doug was seen today for dizziness.    Diagnoses and all orders for this visit:    Dizziness - very likely some decrease in vascular responsiveness when stands/mild orthostasis. Reviewed that this is Ok and  needs to work on hydrating well in the am (which he does) and to get up a bit more slowly. I do not want to decrease his BP meds since he also has relatively higher BPs at home and none (even when he has symptoms and checks) are too low.  Will continue to observe.     GERD - symptoms of food getting stuck are fully resolved on the several months of PPI. He has stopped the PPI so recommended to start Zantac as soon as symptoms or earlier and if they do recur, would then consider EGD.  Symptoms sound like esophagitis.    Total time - 30 minutes with more than half spent counseling regarding what his dizziness is likely from and plans for her GErD.       Medications Discontinued During This Encounter   Medication Reason     ranitidine (ZANTAC) 150 MG tablet      fluticasone (FLONASE) 50 MCG/ACT spray      Options for treatment and follow-up care were reviewed with the patient. Doug Nieves  engaged in the decision making process and verbalized understanding of the options discussed and agreed with the final plan.    Liliam Weinberg MD

## 2018-02-19 NOTE — MR AVS SNAPSHOT
After Visit Summary   2/19/2018    Doug Nieves    MRN: [id number]           Patient Information     Date Of Birth          1945        Visit Information        Provider Department      2/19/2018 10:20 AM Liliam Weinberg MD [facility]        Today's Diagnoses     Dizziness    -  1      Care Instructions    Here is the plan from today's visit    1. Dizziness  Work on keeping things a bit slower.  Keep taking liquids in the am.      2. GERD - consider the zantac at the earliest.  If symptoms do recur, we should do an endoscopy.       Please call or return to clinic if your symptoms don't go away.    Follow up plan  In 6 months.     Thank you for coming to [facility] today.  Lab Testing:  **If you had lab testing today and your results are reassuring or normal they will be mailed to you or sent through Exacter within 7 days.   **If the lab tests need quick action we will call you with the results.  The phone number we will call with results is # [phone] (home) . If this is not the best number please call our clinic and change the number.  Medication Refills:  If you need any refills please call your pharmacy and they will contact us.   If you need to  your refill at a new pharmacy, please contact the new pharmacy directly. The new pharmacy will help you get your medications transferred faster.   Scheduling:  If you have any concerns about today's visit or wish to schedule another appointment please call our office during normal business hours [phone] (8-5:00 M-F)  If a referral was made to a [facility] and you don't get a call from central scheduling please call [phone].  If a Mammogram was ordered for you at [facility] call [phone] to schedule or change your appointment.  If you had an XRay/CT/Ultrasound/MRI ordered the number is [phone] to schedule or change your radiology appointment.   Medical  Concerns:  If you have urgent medical concerns please call [phone] at any time of the day.  If you have a medical emergency please call 911.          Follow-ups after your visit        Who to contact     Please call your clinic at [phone] to:    Ask questions about your health    Make or cancel appointments    Discuss your medicines    Learn about your test results    Speak to your doctor            Additional Information About Your Visit        [place] Information     [employer] gives you secure access to your electronic health record. If you see a primary care provider, you can also send messages to your care team and make appointments. If you have questions, please call your primary care clinic.  If you do not have a primary care provider, please call [phone] and they will assist you.      [employer]fire is an electronic gateway that provides easy, online access to your medical records. With [employer], you can request a clinic appointment, read your test results, renew a prescription or communicate with your care team.     To access your existing account, please contact your [facility] or call [phone] for assistance.        Care EveryWhere ID     This is your Care EveryWhere ID. This could be used by other organizations to access your [place] medical records  [id number]        Your Vitals Were     Pulse Temperature Respirations Pulse Oximetry BMI (Body Mass Index)       62 98.2  F (36.8  C) 12 97% 34.12 kg/m2        Blood Pressure from Last 3 Encounters:   02/19/18 140/86   02/12/18 134/80   11/17/17 133/86    Weight from Last 3 Encounters:   02/19/18 (!) 310 lb 9.6 oz (140.9 kg)   02/12/18 (!) 307 lb 8 oz (139.5 kg)   11/17/17 (!) 313 lb 6.4 oz (142.2 kg)              Today, you had the following     No orders found for display         Today's Medication Changes          These changes are accurate as of 2/19/18 11:13 AM.  If you have any questions, ask your nurse or  doctor.               Stop taking these medicines if you haven't already. Please contact your care team if you have questions.     fluticasone 50 MCG/ACT spray   Commonly known as:  FLONASE   Stopped by:  Liliam Weinberg MD           ranitidine 150 MG tablet   Commonly known as:  ZANTAC   Stopped by:  Liliam Weinberg MD                    Primary Care Provider Office Phone # Fax #    Liliam Weinberg [phone] [address] [address]        Equal Access to Services     [facility]: Hadii aad ku hadasho Soomaali, waaxda luqadaha, qaybta kaalmada adeegyada, waxay idiin hayaan adeeg kharash jovani . So [facility] [phone].    ATENCIÓN: Si habla español, tiene a hawk disposición servicios gratuitos de asistencia lingüística. [facility] [phone].    We comply with applicable federal civil rights laws and [place] laws. We do not discriminate on the basis of race, color, national origin, age, disability, sex, sexual orientation, or gender identity.            Thank you!     Thank you for choosing [facility]  for your care. Our goal is always to provide you with excellent care. Hearing back from our patients is one way we can continue to improve our services. Please take a few minutes to complete the written survey that you may receive in the mail after your visit with us. Thank you!             Your Updated Medication List - Protect others around you: Learn how to safely use, store and throw away your medicines at www.disposemymeds.org.          This list is accurate as of 2/19/18 11:13 AM.  Always use your most recent med list.                   Brand Name Dispense Instructions for use Diagnosis    amLODIPine 5 MG tablet    NORVASC    90 tablet    Take 1 tablet (5 mg) by mouth daily    Essential hypertension       ASPIRIN LOW DOSE 81 MG tablet   Generic drug:  aspirin      Take 1 tablet by mouth daily.        atorvastatin 40 MG tablet    LIPITOR     90 tablet    Take 1 tablet (40 mg) by mouth daily    Essential hypertension       Blood Glucose Monitoring Suppl Génesis      Use as directed        enalapril 20 MG tablet    VASOTEC    180 tablet    Take 2 tablets (40 mg) by mouth daily    Benign essential hypertension       guaiFENesin 600 MG 12 hr tablet    MUCINEX     Take 400 mg by mouth 2 times daily        hydrochlorothiazide 25 MG tablet    HYDRODIURIL    90 tablet    Take 1 tablet (25 mg) by mouth daily    Benign essential hypertension       ipratropium 0.06 % spray    ATROVENT    3 Box    Spray 2 sprays into both nostrils 4 times daily as needed for rhinitis    Allergic rhinitis due to animal hair and dander, unspecified chronicity       loratadine 10 MG tablet    CLARITIN     Take 10 mg by mouth daily        omeprazole 20 MG CR capsule    priLOSEC    90 capsule    Take 1 capsule (20 mg) by mouth daily    Gastroesophageal reflux disease without esophagitis       order for DME     1 Units    Blood pressure cuff    Benign essential hypertension       triamcinolone 0.1 % cream    KENALOG    30 g    Apply sparingly to affected area three times daily for 14 days.    Chronic eczema

## 2018-02-19 NOTE — PATIENT INSTRUCTIONS
Here is the plan from today's visit    1. Dizziness  Work on keeping things a bit slower.  Keep taking liquids in the am.      2. GERD - consider the zantac at the earliest.  If symptoms do recur, we should do an endoscopy.       Please call or return to clinic if your symptoms don't go away.    Follow up plan  In 6 months.     Thank you for coming to [facility] today.  Lab Testing:  **If you had lab testing today and your results are reassuring or normal they will be mailed to you or sent through Widbook within 7 days.   **If the lab tests need quick action we will call you with the results.  The phone number we will call with results is # [phone] (home) . If this is not the best number please call our clinic and change the number.  Medication Refills:  If you need any refills please call your pharmacy and they will contact us.   If you need to  your refill at a new pharmacy, please contact the new pharmacy directly. The new pharmacy will help you get your medications transferred faster.   Scheduling:  If you have any concerns about today's visit or wish to schedule another appointment please call our office during normal business hours [phone] (8-5:00 M-F)  If a referral was made to a [facility] and you don't get a call from central scheduling please call [phone].  If a Mammogram was ordered for you at [facility] call [phone] to schedule or change your appointment.  If you had an XRay/CT/Ultrasound/MRI ordered the number is [phone] to schedule or change your radiology appointment.   Medical Concerns:  If you have urgent medical concerns please call [phone] at any time of the day.  If you have a medical emergency please call [phone].

## 2018-03-03 ENCOUNTER — MYC REFILL (OUTPATIENT)
Dept: FAMILY MEDICINE | Facility: CLINIC | Age: 73
End: 2018-03-03

## 2018-03-03 DIAGNOSIS — I10 BENIGN ESSENTIAL HYPERTENSION: ICD-10-CM

## 2018-03-05 DIAGNOSIS — I10 BENIGN ESSENTIAL HYPERTENSION: ICD-10-CM

## 2018-03-05 RX ORDER — HYDROCHLOROTHIAZIDE 25 MG/1
25 TABLET ORAL DAILY
Qty: 90 TABLET | Refills: 3 | Status: SHIPPED | OUTPATIENT
Start: 2018-03-05 | End: 2019-03-05

## 2018-03-05 RX ORDER — ENALAPRIL MALEATE 20 MG/1
40 TABLET ORAL DAILY
Qty: 180 TABLET | Refills: 1 | Status: SHIPPED | OUTPATIENT
Start: 2018-03-05 | End: 2018-09-13

## 2018-03-05 NOTE — TELEPHONE ENCOUNTER
Request for medication refill:    Date of last visit at clinic: 02/19/2018    Please complete refill if appropriate and CLOSE ENCOUNTER.    Closing the encounter signifies the refill is complete.    If refill has been denied, please complete the smart phrase .smirefuse and route it to the [facility] RN TRIAGE pool to inform the patient and the pharmacy.    Nicolás Pina, CMA

## 2018-03-05 NOTE — TELEPHONE ENCOUNTER
Message from [employer]:  Original authorizing provider: MD Doug Braswell would like a refill of the following medications:  enalapril (VASOTEC) 20 MG tablet [Liliam Weinberg MD]    Preferred pharmacy: [facility] [address] [place] [address] SCOTT RUBIO AT [facility]    Comment:  A request for renewal was submitted on Friday, 2/23/18. This was submitted by my pharmacy. As of today they have not received any communication from you. Can you please find a reason for the delay? Tha Nieves

## 2018-03-05 NOTE — TELEPHONE ENCOUNTER
Date of last visit at clinic: 2/19/18    Please complete refill and CLOSE ENCOUNTER.  Closing the encounter signifies the refill is complete.    Sherri Yen RN

## 2018-03-28 ENCOUNTER — MYC MEDICAL ADVICE (OUTPATIENT)
Dept: FAMILY MEDICINE | Facility: CLINIC | Age: 73
End: 2018-03-28

## 2018-03-28 DIAGNOSIS — H93.13 TINNITUS, BILATERAL: Primary | ICD-10-CM

## 2018-03-28 NOTE — TELEPHONE ENCOUNTER
OTOLARYNGOLOGY REFERRAL - EXTERNAL    [#[id number]]          Priority: Routine  Class: External referral         Comment:Patient will  to schedule their own appointment                                    Referral to Dr. Humphrey group                   [facility]                                    At this location  [address], [place] [address]       Associated Diagnoses         H93.13 Tinnitus, bilateral

## 2018-05-24 ENCOUNTER — OFFICE VISIT (OUTPATIENT)
Dept: FAMILY MEDICINE | Facility: CLINIC | Age: 73
End: 2018-05-24
Payer: COMMERCIAL

## 2018-05-24 VITALS
SYSTOLIC BLOOD PRESSURE: 145 MMHG | OXYGEN SATURATION: 96 % | TEMPERATURE: 98.1 F | RESPIRATION RATE: 16 BRPM | WEIGHT: 311.6 LBS | BODY MASS INDEX: 36.05 KG/M2 | HEIGHT: 78 IN | DIASTOLIC BLOOD PRESSURE: 87 MMHG | HEART RATE: 63 BPM

## 2018-05-24 DIAGNOSIS — Z00.00 WELLNESS EXAMINATION: Primary | ICD-10-CM

## 2018-05-24 DIAGNOSIS — I10 ESSENTIAL HYPERTENSION: ICD-10-CM

## 2018-05-24 DIAGNOSIS — D22.9 ATYPICAL MOLE: ICD-10-CM

## 2018-05-24 NOTE — MR AVS SNAPSHOT
After Visit Summary   5/24/2018    Doug Nieves    MRN: [id number]           Patient Information     Date Of Birth          1945        Visit Information        Provider Department      5/24/2018 9:40 AM Liliam Weinberg MD [facility]        Today's Diagnoses     Wellness examination    -  1    Atypical mole        Essential hypertension          Care Instructions    [facility] [employer] PERSONAL PREVENTIVE SERVICES PLAN - SERVICES     Review these tests with your doctor then decide which ones you want and take this page home for your reference                                                    IMMUNIZATIONS Description Recommend today?     Hepatitis B  If you have any of the following risk factors you should be immunized for hepatitis B: severe kidney disease, people who live in the same house as a carrier of Hepatitis B virus, people who live in  institutions (e.g. nursing homes or group homes), homosexual men, patients with hemophilia who received Factor VIII or IX concentrates, abusers of illicit injectable drugs No; is up to date.     SCREENING TESTS     Description   Year of Last Screening   Recommended Today?   Heart disease screening blood tests    Cholesterol level Reducing cholesterol can reduce your risk of heart attacks by 25%.  Screening is recommended yearly if you are at risk of heart disease otherwise every 4-5 years 2017 No: is not indicated today.   Diabetes screening tests    Hemoglobin A1c blood test   Finding and treating diabetes early can reduce complications.  Screening recommended/covered yearly if you have high blood pressure, high cholesterol, obesity (BMI >30), or a history of high blood glucose tests; or 2 of the following: family history of diabetes, overweight (BMI >25 but <30), age 65 years or older, and a history of diabetes of pregnancy or gave birth to baby weighing more than 9 lbs. 2017 No: is not indicated today.   Hepatitis B screening  Finding hepatitis B early can reduce complications.  Screening is recommended for persons with selected risk factors. done No: is not indicated today.   Hepatitis C screening Finding hepatitis C early can reduce complications.  Screening is recommended for all persons born from 1945 through 1965 and for those with selected other risk factors.  2011 No, not indicated.    HIV screening Finding HIV early can reduce complications.  Screening is recommended for persons with risk factors for HIV infection. Not indicating No: is not indicated today.   Glaucoma screening Early detection of glaucoma can prevent blindness.   Please talk to your eye doctor about this.       SCREENING TESTS     Description   Year of Last Screening   Recommended Today?   Colorectal cancer screening    Fecal occult blood test     Screening colonoscopy Screening for colon cancer has been shown to reduce death from colon cancer by 25-30%. Screening recommended to start at 50 years and continuing until age 75 years.   2015 No: is not indicated today.   Breast Cancer Screening (women)    Mammogram Mammogram screening for breast cancer has been shown to reduce the risk of dying from breast cancer and prolong life. Screening is recommended every 1-2 years for women aged 50 to 74 years.  Not indicated No: is not indicated today.   Cervical Cancer screening (women)    Pap Cervical pap smears can reduce cervical cancer. Screening is recommended annually if high risk (history of abnormal pap smears) otherwise every 2-3 years, stop screening at 65 years of age if history of normal paps. Not indicated No: is not indicated today.   Screening for Osteoporosis:  Bone mass measurements (women)    Dexa Scan Screening and treating Osteoporosis can reduce the risk of hip and spine fractures. Screening is recommended in women 65 years or older and in women and men at risk of osteoporosis. Not indicated No: is not indicated today.   Screening for Lung Cancer      Low-dose CT scanning Screening can reduce mortality in persons aged 55-80 who have smoked at least 30 pack-years and who are either still smoking or have quit in the past 15 years. Not indicated No: is not indicated today.   Abdominal Aortic Aneurysm (AAA) screening    Ultrasound (US)   An aneurysm treated before rupture is very safe -a ruptured aneurysm can be fatal.  Screening  by US for AAA is limited to patients who meet one of the following criteria:    Men who are 65-75 years old and have smoked more than 100 cigarettes in their lifetime    Anyone with a family history of abdominal aortic aneurysm 2012 completed             MEDICARE WELLNESS EXAM PATIENT INSTRUCTIONS    Yearly exam:     See your health care provider every year in order to review changes in your health, review medicines that you take, and discuss preventive care needs such as immunizations and cancer screening.    Get a flu shot each year.     Advance Directives:    If you have not done so, you are encouraged to complete advance directives and/or a living will.   More information about advance directives can be found at: http://www.[place]med.org/advocacy/Key-Issues/Advance-Directives    Nutrition:     Eat at least 5 servings of fruits and vegetables each day.     Eat whole-grain bread, whole-wheat pasta and brown rice instead of white grains and rice.     Talk to your doctor about Calcium and Vitamin D.     Lifestyle:    Exercise for at least 150 minutes a week (30 minutes a day, 5 days a week). This will help you control your weight and prevent disease.     Limit alcohol to one drink per day.     If you smoke, try to quit - your doctor will be happy to help.     Wear sunscreen to prevent skin cancer.     See your dentist every six months for an exam and cleaning.     See your eye doctor every 1 to 2 years to screen for conditions such as glaucoma, macular degeneration and cataracts.    Here is the plan from today's visit    1. Atypical  mole  Come get that removed in the next month.   - [facility] INTERNAL REFERRAL    2. Wellness examination  All is looking good!! Keep working your brain.      3. Essential hypertension  Controlled - keep taking your meds and writing down.       Return in 3 - 6 months.   Please call or return to clinic if your symptoms don't go away.    Follow up plan      Thank you for coming to [facility] today.  Lab Testing:  **If you had lab testing today and your results are reassuring or normal they will be mailed to you or sent through [employer] within 7 days.   **If the lab tests need quick action we will call you with the results.  The phone number we will call with results is # [phone] (home) . If this is not the best number please call our clinic and change the number.  Medication Refills:  If you need any refills please call your pharmacy and they will contact us.   If you need to  your refill at a new pharmacy, please contact the new pharmacy directly. The new pharmacy will help you get your medications transferred faster.   Scheduling:  If you have any concerns about today's visit or wish to schedule another appointment please call our office during normal business hours [phone] (8-5:00 M-F)  If a referral was made to a [facility] and you don't get a call from central scheduling please call [phone].  If a Mammogram was ordered for you at [facility] call [phone] to schedule or change your appointment.  If you had an XRay/CT/Ultrasound/MRI ordered the number is [phone] to schedule or change your radiology appointment.   Medical Concerns:  If you have urgent medical concerns please call [phone] at any time of the day.  If you have a medical emergency please call [phone].          Follow-ups after your visit        Additional Services     [facility]       What procedure, if known: excision of lesion on abdomen  -rule out melanoma  Diagnosis: Atypical Mole  Urgency of Appointment: Next Available  Would this patient benefit from pre-medication with Ativan for procedural anxiety? No  Is this patient on a blood thinner? No  If this referral is for a circumcision, has the patient had a Vitamin K shot? N/A                  Your next 10 appointments already scheduled     May 29, 2018  8:00 AM CDT   (Arrive by 7:30 AM)   MR BRAIN W/O & W CONTRAST with URMR2   [facility], [place], MRI ([facility])    [facility] [address] [phone]           Take your medicines as usual, unless your doctor tells you not to. Bring a list of your current medicines to your exam (including vitamins, minerals and over-the-counter drugs).  You may or may not receive intravenous (IV) contrast for this exam pending the discretion of the Radiologist.  You do not need to do anything special to prepare.  The MRI machine uses a strong magnet. Please wear clothes without metal (snaps, zippers). A sweatsuit works well, or we may give you a hospital gown.  Please remove any body piercings and hair extensions before you arrive. You will also remove watches, jewelry, hairpins, wallets, dentures, partial dental plates and hearing aids. You may wear contact lenses, and you may be able to wear your rings. We have a safe place to keep your personal items, but it is safer to leave them at home.  **IMPORTANT** THE INSTRUCTIONS BELOW ARE ONLY FOR THOSE PATIENTS WHO HAVE BEEN PRESCRIBED SEDATION OR GENERAL ANESTHESIA DURING THEIR MRI PROCEDURE:  IF YOUR DOCTOR PRESCRIBED ORAL SEDATION (take medicine to help you relax during your exam):   You must get the medicine from your doctor (oral medication) before you arrive. Bring the medicine to the exam. Do not take it at home. You ll be told when to take it upon arriving for your exam.   Arrive one hour early. Bring someone who can take you home after the  test. Your medicine will make you sleepy. After the exam, you may not drive, take a bus or take a taxi by yourself.  IF YOUR DOCTOR PRESCRIBED IV SEDATION:   Arrive one hour early. Bring someone who can take you home after the test. Your medicine will make you sleepy. After the exam, you may not drive, take a bus or take a taxi by yourself.   No eating 6 hours before your exam. You may have clear liquids up until 4 hours before your exam. (Clear liquids include water, clear tea, black coffee and fruit juice without pulp.)  IF YOUR DOCTOR PRESCRIBED ANESTHESIA (be asleep for your exam):   Arrive 1 1/2 hours early. Bring someone who can take you home after the test. You may not drive, take a bus or take a taxi by yourself.   No eating 8 hours before your exam. You may have clear liquids up until 4 hours before your exam. (Clear liquids include water, clear tea, black coffee and fruit juice without pulp.)   You will spend four to five hours in the recovery room.  Please call the Imaging Department at your exam site with any questions.              Who to contact     Please call your clinic at [phone] to:    Ask questions about your health    Make or cancel appointments    Discuss your medicines    Learn about your test results    Speak to your doctor            Additional Information About Your Visit        [employer] Information     [employer] gives you secure access to your electronic health record. If you see a primary care provider, you can also send messages to your care team and make appointments. If you have questions, please call your primary care clinic.  If you do not have a primary care provider, please call [phone] and they will assist you.      [employer] is an electronic gateway that provides easy, online access to your medical records. With [employer], you can request a clinic appointment, read your test results, renew a prescription or communicate with your care team.     To access your existing account,  "please contact your [facility] or call [phone] for assistance.        Care EveryWhere ID     This is your Care EveryWhere ID. This could be used by other organizations to access your [place] medical records  [id number]        Your Vitals Were     Pulse Temperature Respirations Height Pulse Oximetry BMI (Body Mass Index)    63 98.1  F (36.7  C) (Oral) 16 6' 8\" (203.2 cm) 96% 34.23 kg/m2       Blood Pressure from Last 3 Encounters:   05/24/18 145/87   02/19/18 140/86   02/12/18 134/80    Weight from Last 3 Encounters:   05/24/18 311 lb 9.6 oz (141.3 kg)   02/19/18 (!) 310 lb 9.6 oz (140.9 kg)   02/12/18 (!) 307 lb 8 oz (139.5 kg)              We Performed the Following     Procedure [facility] INTERNAL REFERRAL        Primary Care Provider Office Phone # Fax #    Liliam Weinberg [phone]       [address] [address]        Equal Access to Services     [facility] : Hadii adeline Mendez, waaxda ila, qaybta nisha graves, nichole hahn . So [facility] [phone].    ATENCIÓN: Si habla español, tiene a hawk disposición servicios gratuitos de asistencia lingüística. [facility] [phone].    We comply with applicable federal civil rights laws and [place] laws. We do not discriminate on the basis of race, color, national origin, age, disability, sex, sexual orientation, or gender identity.            Thank you!     Thank you for choosing [facility]  for your care. Our goal is always to provide you with excellent care. Hearing back from our patients is one way we can continue to improve our services. Please take a few minutes to complete the written survey that you may receive in the mail after your visit with us. Thank you!             Your Updated Medication List - Protect others around you: Learn how to safely use, store and throw away your medicines at " www.disposemymeds.org.          This list is accurate as of 5/24/18 11:10 AM.  Always use your most recent med list.                   Brand Name Dispense Instructions for use Diagnosis    amLODIPine 5 MG tablet    NORVASC    90 tablet    Take 1 tablet (5 mg) by mouth daily    Essential hypertension       ASPIRIN LOW DOSE 81 MG tablet   Generic drug:  aspirin      Take 1 tablet by mouth daily.        atorvastatin 40 MG tablet    LIPITOR    90 tablet    Take 1 tablet (40 mg) by mouth daily    Essential hypertension       Blood Glucose Monitoring Suppl Génesis      Use as directed        enalapril 20 MG tablet    VASOTEC    180 tablet    Take 2 tablets (40 mg) by mouth daily    Benign essential hypertension       guaiFENesin 600 MG 12 hr tablet    MUCINEX     Take 400 mg by mouth 2 times daily        hydrochlorothiazide 25 MG tablet    HYDRODIURIL    90 tablet    Take 1 tablet (25 mg) by mouth daily    Benign essential hypertension       ipratropium 0.06 % spray    ATROVENT    3 Box    Spray 2 sprays into both nostrils 4 times daily as needed for rhinitis    Allergic rhinitis due to animal hair and dander, unspecified chronicity       loratadine 10 MG tablet    CLARITIN     Take 10 mg by mouth daily        omeprazole 20 MG CR capsule    priLOSEC    90 capsule    Take 1 capsule (20 mg) by mouth daily    Gastroesophageal reflux disease without esophagitis       order for DME     1 Units    Blood pressure cuff    Benign essential hypertension       triamcinolone 0.1 % cream    KENALOG    30 g    Apply sparingly to affected area three times daily for 14 days.    Chronic eczema

## 2018-05-24 NOTE — PATIENT INSTRUCTIONS
[facility] MEDICARE PERSONAL PREVENTIVE SERVICES PLAN - SERVICES     Review these tests with your doctor then decide which ones you want and take this page home for your reference                                                    IMMUNIZATIONS Description Recommend today?     Hepatitis B  If you have any of the following risk factors you should be immunized for hepatitis B: severe kidney disease, people who live in the same house as a carrier of Hepatitis B virus, people who live in  institutions (e.g. nursing homes or group homes), homosexual men, patients with hemophilia who received Factor VIII or IX concentrates, abusers of illicit injectable drugs No; is up to date.     SCREENING TESTS     Description   Year of Last Screening   Recommended Today?   Heart disease screening blood tests    Cholesterol level Reducing cholesterol can reduce your risk of heart attacks by 25%.  Screening is recommended yearly if you are at risk of heart disease otherwise every 4-5 years 2017 No: is not indicated today.   Diabetes screening tests    Hemoglobin A1c blood test   Finding and treating diabetes early can reduce complications.  Screening recommended/covered yearly if you have high blood pressure, high cholesterol, obesity (BMI >30), or a history of high blood glucose tests; or 2 of the following: family history of diabetes, overweight (BMI >25 but <30), age 65 years or older, and a history of diabetes of pregnancy or gave birth to baby weighing more than 9 lbs. 2017 No: is not indicated today.   Hepatitis B screening Finding hepatitis B early can reduce complications.  Screening is recommended for persons with selected risk factors. done No: is not indicated today.   Hepatitis C screening Finding hepatitis C early can reduce complications.  Screening is recommended for all persons born from 1945 through 1965 and for those with selected other risk factors.  2011 No, not indicated.    HIV screening Finding HIV early can reduce  complications.  Screening is recommended for persons with risk factors for HIV infection. Not indicating No: is not indicated today.   Glaucoma screening Early detection of glaucoma can prevent blindness.   Please talk to your eye doctor about this.       SCREENING TESTS     Description   Year of Last Screening   Recommended Today?   Colorectal cancer screening    Fecal occult blood test     Screening colonoscopy Screening for colon cancer has been shown to reduce death from colon cancer by 25-30%. Screening recommended to start at 50 years and continuing until age 75 years.   2015 No: is not indicated today.   Breast Cancer Screening (women)    Mammogram Mammogram screening for breast cancer has been shown to reduce the risk of dying from breast cancer and prolong life. Screening is recommended every 1-2 years for women aged 50 to 74 years.  Not indicated No: is not indicated today.   Cervical Cancer screening (women)    Pap Cervical pap smears can reduce cervical cancer. Screening is recommended annually if high risk (history of abnormal pap smears) otherwise every 2-3 years, stop screening at 65 years of age if history of normal paps. Not indicated No: is not indicated today.   Screening for Osteoporosis:  Bone mass measurements (women)    Dexa Scan Screening and treating Osteoporosis can reduce the risk of hip and spine fractures. Screening is recommended in women 65 years or older and in women and men at risk of osteoporosis. Not indicated No: is not indicated today.   Screening for Lung Cancer     Low-dose CT scanning Screening can reduce mortality in persons aged 55-80 who have smoked at least 30 pack-years and who are either still smoking or have quit in the past 15 years. Not indicated No: is not indicated today.   Abdominal Aortic Aneurysm (AAA) screening    Ultrasound (US)   An aneurysm treated before rupture is very safe -a ruptured aneurysm can be fatal.  Screening  by US for AAA is limited to patients  who meet one of the following criteria:    Men who are 65-75 years old and have smoked more than 100 cigarettes in their lifetime    Anyone with a family history of abdominal aortic aneurysm 2012 completed             MEDICARE WELLNESS EXAM PATIENT INSTRUCTIONS    Yearly exam:     See your health care provider every year in order to review changes in your health, review medicines that you take, and discuss preventive care needs such as immunizations and cancer screening.    Get a flu shot each year.     Advance Directives:    If you have not done so, you are encouraged to complete advance directives and/or a living will.   More information about advance directives can be found at: http://www.[place]med.org/advocacy/Key-Issues/Advance-Directives    Nutrition:     Eat at least 5 servings of fruits and vegetables each day.     Eat whole-grain bread, whole-wheat pasta and brown rice instead of white grains and rice.     Talk to your doctor about Calcium and Vitamin D.     Lifestyle:    Exercise for at least 150 minutes a week (30 minutes a day, 5 days a week). This will help you control your weight and prevent disease.     Limit alcohol to one drink per day.     If you smoke, try to quit - your doctor will be happy to help.     Wear sunscreen to prevent skin cancer.     See your dentist every six months for an exam and cleaning.     See your eye doctor every 1 to 2 years to screen for conditions such as glaucoma, macular degeneration and cataracts.    Here is the plan from today's visit    1. Atypical mole  Come get that removed in the next month.   [facility]    2. Wellness examination  All is looking good!! Keep working your brain.      3. Essential hypertension  Controlled - keep taking your meds and writing down.       Return in 3 - 6 months.   Please call or return to clinic if your symptoms don't go away.    Follow up plan      Thank you for coming to [facility] today.  Lab  Testing:  **If you had lab testing today and your results are reassuring or normal they will be mailed to you or sent through [employer] within 7 days.   **If the lab tests need quick action we will call you with the results.  The phone number we will call with results is # [phone] (home) . If this is not the best number please call our clinic and change the number.  Medication Refills:  If you need any refills please call your pharmacy and they will contact us.   If you need to  your refill at a new pharmacy, please contact the new pharmacy directly. The new pharmacy will help you get your medications transferred faster.   Scheduling:  If you have any concerns about today's visit or wish to schedule another appointment please call our office during normal business hours [phone] (8-5:00 M-F)  If a referral was made to a [facility] and you don't get a call from central scheduling please call [phone].  If a Mammogram was ordered for you at [facility] call [phone] to schedule or change your appointment.  If you had an XRay/CT/Ultrasound/MRI ordered the number is [phone] to schedule or change your radiology appointment.   Medical Concerns:  If you have urgent medical concerns please call [phone] at any time of the day.  If you have a medical emergency please call [phone].

## 2018-05-24 NOTE — PROGRESS NOTES
>67 year old Wellness Visit ( Medicare etc)           HPI       This 72 year old male presents as an established patient  Liliam Weinberg who presents for a  Annual Wellness Exam.    Other issues patient wants to address today:    none    BPs - are much better on current meds.     Was seen at [facility] for dizziness - will be doing a MRI to assure no stroke. There from time to time.  Takes BP meds after eats and is systematic.  Has not taken his BP meds this am.  Never lightheaded when exercises.     Left knee is doing better in general - very focused on falls and therefor has changed his life.       Visual Acuity:  Right Eye: 20/25   Left Eye: 20/40  Both Eyes: 20/25    Patient Active Problem List   Diagnosis     Lumbar radiculopathy     Chronic kidney disease, stage II (mild)     Diverticulosis of large intestine     Sebaceous cyst     Esophageal reflux     Essential hypertension     Open angle with borderline findings, low risk     Benign neoplasm of colon     Rosacea     Other seborrheic keratosis     Shoulder joint pain     Dermatofibroma of lower extremity     Hyperlipidemia LDL goal <100     Prediabetes       Past Medical History:   Diagnosis Date     Gastro-oesophageal reflux disease      Hypertension      Quadriceps tendon rupture 3/16/2013        Family History   Problem Relation Age of Onset     DIABETES Mother      Alzheimer Disease Mother      DIABETES Brother      DIABETES Brother      DIABETES Brother      DIABETES Brother      Genitourinary Problems Father      CKD secondary to being quad         Problem List, Family History and past Medical History reviewed and unchanged/updated.       Health risk Assessment/ Review of Systems:         Constitutional:   Fevers or night sweats?  NO      Eyes:   Vision problems?  NO            Hearing:  Do you feel you have hearing loss?  NO  Cardiovascular:  Chest pain, palpitations, or pain with walking?  NO        Respiratory:   Breathing problems or cough?  NO     :   Difficulty controlling urination?  NO        Musculoskeletal:   Stiffness or sore joints?  NO            Skin:   concerning lesions or moles?  NO           Nervous System:   loss of strength or sensation,  numbness or tingling,  tremor,  dizziness,  headache?  NO         Mental Health:   depression or anxiety,  sleep problems?  NO   Cognition:  Memory problems?  NO       Weight Loss: Have you lost 10 or more pounds unintentionally in the previous year?  NO  Energy: How much of the time during the past 3 weeks did you feel tired?   (check one of the following):   ?  All of the time  ? Most of the time  ? Some of the time  ? A little of the time  ? None of the Time    PHQ-2 Score:   PHQ-2 ( 1999 Pfizer) 5/24/2018 2/19/2018   Q1: Little interest or pleasure in doing things 0 0   Q2: Feeling down, depressed or hopeless 0 0   PHQ-2 Score 0 0       PHQ-9 Score:   No flowsheet data found.  Medical Care:     What other specialists or organizations are involved in your medical care?  As below  Patient Care Team       Relationship Specialty Notifications Start End    Liliam Weinberg MD PCP - General Family Practice  9/29/11     Phone: [phone] Fax: [phone]         [address] [facility] [address]    Deirdre Dietz MD MD Colon and Rectal Surgery  6/11/15     Phone: [phone] Fax: [phone]         [address]  [facility] [address]    Jorge Taylor MD MD Orthopedics  12/11/15     Phone: [phone] Fax: [phone]         [address] [facility] [address]              Social History / Home Safety     Social History   Substance Use Topics     Smoking status: Former Smoker     Quit date: 3/18/1980     Smokeless tobacco: Never Used      Comment: Quit many years ago     Alcohol use Yes      Comment: occasional      Marital Status:  Who lives in your household? Myself and My Wife  Does your home have any of the following safety concerns? Loose rugs  in the hallway, no grab bars in the bathroom, no handrails on the stairs or have poorly lit areas?  No   Do you feel threatened or controlled by a partner, ex-partner or anyone in your life? {Yes No   Has anyone hurt you physically, for example by pushing, hitting, slapping or kicking you   or forcing you to have sex? No       Functional Status     Do you need help with dressing yourself, bathing, or walking?No   Do you need help with the phone, transportation, shopping, preparing meals, housework, laundry, medications or managing money?No   By yourself and not using aids, do you have any difficulty walking up 10 steps  without resting? No   By yourself and not using aids, do you have any difficulty walking several hundred yards? No              Risk Behaviors and Healthy Habits     History   Smoking Status     Former Smoker     Quit date: 3/18/1980   Smokeless Tobacco     Never Used     Comment: Quit many years ago     How many servings of fruits and vegetables do you eat a day? 4-5  Exercise:walking  2-3 days/week for an average of 15-30 minutes  Do you frequently drive without a seatbelt? No   History   Smoking Status     Former Smoker     Quit date: 3/18/1980   Smokeless Tobacco     Never Used     Comment: Quit many years ago     Do you use any other drugs? No       Do you use alcohol?Yes        Functional Ability   Was the patient's timed Up & Go test (Get up from chair walk, 10 feet turn, return to chair and sit down) unsteady or longer than 30 seconds? No     Fall risk:     1. Have you fallen two or more times in the past year? No   2. Have you fallen and had an injury in the past year?  No         Evaulation of Cognitive Function     Family member/caregiver input: Normal  COGNITIVE SCREEN  1) Repeat 3 items (Banana, Sunrise, Chair)    2) Clock draw: NORMAL  3) 3 item recall: Recalls 2 objects - actually recalled all 3.   Results: ABNORMAL clock, 1-2 items     Mini-CogTM Copyright RONNY Cruz. Licensed by the  "author for use in [facility]; reprinted with permission ([email or web address]). All rights reserved.              Other Assessments:     CV Risk based on Pooled Cohort Risk (consider assessing every 4-6 years; consider statin in patients with 10-yr risk > 7.5%):   The ASCVD Risk score (Xochitlady FRANCIS Jr, et al., 2013) failed to calculate for the following reasons:    The valid total cholesterol range is 130 to 320 mg/dL    Advance Directives: Discussed with patient and family as appropriate.  Has patient completed advance directives and/or a living will?  yes in 2015    Immunization History   Administered Date(s) Administered     FLU 6-35 months 11/30/2012     Influenza (High Dose) 3 valent vaccine 10/16/2015, 10/14/2016, 10/03/2017     Influenza (IIV3) PF 11/30/2012, 10/28/2013     Pneumo Conj 13-V (2010&after) 12/08/2016     Pneumococcal 23 valent 11/30/2012     Td (Adult), Adsorbed 03/05/2009     Zoster vaccine, live 11/30/2012     Reviewed Immunization Record Today    Physical Exam     Vitals: /87  Pulse 63  Temp 98.1  F (36.7  C) (Oral)  Resp 16  Ht 6' 8\" (203.2 cm)  Wt 311 lb 9.6 oz (141.3 kg)  SpO2 96%  BMI 34.23 kg/m2  BMI= Body mass index is 34.23 kg/(m^2).  GENERAL APPEARANCE: alert and no distress  HENT: ear canals patent and TM's normal; mouth without ulcers or lesions; and oral mucous membranes moist  Hearing loss screen:   Not Examined   DENTITION:  Good repair?  yes  RESP: lungs clear to auscultation - no rales, rhonchi or wheezes  CV: regular rates and rhythm, no murmur, click or rub, no peripheral edema and peripheral pulses strong  SKIN: about 8 mm round, variegated with dark areas (new) lesion on his abdomen near his umbilicus.  many sun changes.   NEURO: Normal strength and tone  MENTAL STATUS EXAM  Appearance: appropriate  Attitude: cooperative  Behavior: normal  Eye Contact: normal  Speech: normal  Orientation: oreinted to person , place, time and situation  Mood:  nl  Affect: Mood " Congruent  Thought Process: clear        Assessment and Plan       Welcome to Medicare Preventive Visit / Initial Medicare Annual Wellness Exam - reviewed Preventive Services and Plan form with patient as specified in Patient Instructions.    Doug was seen today for physical.    Diagnoses and all orders for this visit:    Wellness examination - doing well. A bit concerning since rayna the clock off (had all the numbers correct but reversed the arms) but did recall the 3 words many minutes after he was told them.  He will watch out and we will keep an eye on this. His wife (who had left the room) did not comment on this earlier.  Up to date on all wellness    Atypical mole - larger and varriegated in color with some darker areas. Would do excision with 6 mm borders.   -     [facility]    Essential hypertension - controlled - brings in his numbers which are all about 120 or less. Plan to return in 6 months for routine testing      Options for treatment and follow-up care were reviewed with the Doug Nieves and/or guardian engaged in the decision making process and verbalized understanding of the options discussed and agreed with the final plan.    Liliam Weinberg MD

## 2018-06-21 ENCOUNTER — OFFICE VISIT (OUTPATIENT)
Dept: FAMILY MEDICINE | Facility: CLINIC | Age: 73
End: 2018-06-21
Payer: COMMERCIAL

## 2018-06-21 VITALS
SYSTOLIC BLOOD PRESSURE: 147 MMHG | OXYGEN SATURATION: 100 % | TEMPERATURE: 98.2 F | BODY MASS INDEX: 34.3 KG/M2 | HEART RATE: 61 BPM | WEIGHT: 312.2 LBS | DIASTOLIC BLOOD PRESSURE: 88 MMHG

## 2018-06-21 DIAGNOSIS — D22.5 MELANOCYTIC NEVUS OF TRUNK: Primary | ICD-10-CM

## 2018-06-21 NOTE — MR AVS SNAPSHOT
After Visit Summary   6/21/2018    Doug Nieves    MRN: [id number]           Patient Information     Date Of Birth          1945        Visit Information        Provider Department      6/21/2018 9:20 AM Margo De La Rosa MD [facility]        Today's Diagnoses     Melanocytic nevus of trunk    -  1       Follow-ups after your visit        Who to contact     Please call your clinic at [phone] to:    Ask questions about your health    Make or cancel appointments    Discuss your medicines    Learn about your test results    Speak to your doctor            Additional Information About Your Visit        MyChart Information     Joome gives you secure access to your electronic health record. If you see a primary care provider, you can also send messages to your care team and make appointments. If you have questions, please call your primary care clinic.  If you do not have a primary care provider, please call [phone] and they will assist you.      Joome is an electronic gateway that provides easy, online access to your medical records. With Joome, you can request a clinic appointment, read your test results, renew a prescription or communicate with your care team.     To access your existing account, please contact your [facility] or call [phone] for assistance.        Care EveryWhere ID     This is your Care EveryWhere ID. This could be used by other organizations to access your [facility] medical records  [id number]        Your Vitals Were     Pulse Temperature Pulse Oximetry BMI (Body Mass Index)          61 98.2  F (36.8  C) (Oral) 100% 34.3 kg/m2         Blood Pressure from Last 3 Encounters:   06/21/18 147/88   05/24/18 145/87   02/19/18 140/86    Weight from Last 3 Encounters:   06/21/18 312 lb 3.2 oz (141.6 kg)   05/24/18 311 lb 9.6 oz (141.3 kg)   02/19/18 (!) 310 lb 9.6 oz (140.9 kg)              We Performed the  Following     EXC BENIGN SKIN LESION TRUNK/ARM/LEG 0.6-1.0 CM     Surgical pathology exam        Primary Care Provider Office Phone # Fax #    Liliam Weinberg [phone] [address] [address]        Equal Access to Services     BRANDON LOGAN : Hadii adeline bajwa hadabilioo Soomaali, waaxda luqadaha, qaybta kaalmada adeegyada, waxthierry zurita zoeybobo willemeritagustavo sanchez. So [facility] [phone].    ATENCIÓN: Si habla español, tiene a hawk disposición servicios gratuitos de asistencia lingüística. [facility] [phone].    We comply with applicable federal civil rights laws and [place] laws. We do not discriminate on the basis of race, color, national origin, age, disability, sex, sexual orientation, or gender identity.            Thank you!     Thank you for choosing [facility]  for your care. Our goal is always to provide you with excellent care. Hearing back from our patients is one way we can continue to improve our services. Please take a few minutes to complete the written survey that you may receive in the mail after your visit with us. Thank you!             Your Updated Medication List - Protect others around you: Learn how to safely use, store and throw away your medicines at www.disposemymeds.org.          This list is accurate as of 6/21/18 11:59 PM.  Always use your most recent med list.                   Brand Name Dispense Instructions for use Diagnosis    amLODIPine 5 MG tablet    NORVASC    90 tablet    Take 1 tablet (5 mg) by mouth daily    Essential hypertension       ASPIRIN LOW DOSE 81 MG tablet   Generic drug:  aspirin      Take 1 tablet by mouth daily.        atorvastatin 40 MG tablet    LIPITOR    90 tablet    Take 1 tablet (40 mg) by mouth daily    Essential hypertension       Blood Glucose Monitoring Suppl Génesis      Use as directed        enalapril 20 MG tablet    VASOTEC    180 tablet    Take 2 tablets (40 mg) by mouth daily    Benign  essential hypertension       guaiFENesin 600 MG 12 hr tablet    MUCINEX     Take 400 mg by mouth 2 times daily        hydrochlorothiazide 25 MG tablet    HYDRODIURIL    90 tablet    Take 1 tablet (25 mg) by mouth daily    Benign essential hypertension       ipratropium 0.06 % spray    ATROVENT    3 Box    Spray 2 sprays into both nostrils 4 times daily as needed for rhinitis    Allergic rhinitis due to animal hair and dander, unspecified chronicity       loratadine 10 MG tablet    CLARITIN     Take 10 mg by mouth daily        omeprazole 20 MG CR capsule    priLOSEC    90 capsule    Take 1 capsule (20 mg) by mouth daily    Gastroesophageal reflux disease without esophagitis       order for DME     1 Units    Blood pressure cuff    Benign essential hypertension       triamcinolone 0.1 % cream    KENALOG    30 g    Apply sparingly to affected area three times daily for 14 days.    Chronic eczema

## 2018-06-22 NOTE — PROGRESS NOTES
[facility]  [facility] Procedure Note    Doug Nieves is a patient of Liliam Fuller here for atypical nevus    Consent: Affirmation of informed consent was signed and scanned into the medical record. Risks, benefits and alternatives were discussed. Patient's questions were elicited and answered.   Procedure safety checklist was completed:  Yes  Time Out (Pause for the Cause) completed: Yes    Preoperative Diagnosis: atypical nevus   Location: abdomen   Size:  1 cm  Postoperative Diagnosis: same    Technique:   Skin prep Betadine  Anesthesia 4 cc 1% lidocaine, with epi   Biopsy size 1 cm  Biopsy taken via (shave, punch, incisional) shave  Hemostasis  drysol    EBL:    minimal  Complications:  No  Tolerance:   Pt tolerated procedure well and was in stable condition.   Pathology sent Yes    Instructions:    Pt should keep dressing in place for 24 hours, then may change and apply antibiotic ointment and simple bandage. May shower after 24 hours.  Pt was instructed to call if bleeding, severe pain or foul smell.   I will notify of path report by MyChart or phone call      Resident: Margo De La Rosa  Faculty: Margo De La Rosa MD present for and supervised this entire procedure.

## 2018-06-28 LAB — COPATH REPORT: NORMAL

## 2018-09-13 DIAGNOSIS — I10 BENIGN ESSENTIAL HYPERTENSION: ICD-10-CM

## 2018-09-13 RX ORDER — ENALAPRIL MALEATE 20 MG/1
40 TABLET ORAL DAILY
Qty: 180 TABLET | Refills: 1 | Status: SHIPPED | OUTPATIENT
Start: 2018-09-13 | End: 2019-04-01

## 2018-09-13 NOTE — TELEPHONE ENCOUNTER

## 2018-09-17 ENCOUNTER — TELEPHONE (OUTPATIENT)
Dept: FAMILY MEDICINE | Facility: CLINIC | Age: 73
End: 2018-09-17

## 2018-09-17 NOTE — TELEPHONE ENCOUNTER
Prior Authorization Retail Medication Request    Medication/Dose: Veramsyst 27.5MCG Nasal SP (120INH)  ICD code (if different than what is on RX):  See chart   Previously Tried and Failed:  See chart  Rationale:  See chart    Insurance Name:  See chart  Insurance ID:  [id number]        Pharmacy Information (if different than what is on RX)  Name:  [facility]  Phone:  [phone]

## 2018-09-20 ENCOUNTER — MYC MEDICAL ADVICE (OUTPATIENT)
Dept: FAMILY MEDICINE | Facility: CLINIC | Age: 73
End: 2018-09-20

## 2018-09-20 NOTE — TELEPHONE ENCOUNTER
Dr. Weinberg, I do not even see this medication on the patients med list, please advise with correct dosing for PA to be initiated.     Thank you,     FRANKI Contreras 4:24 PM September 20, 2018

## 2018-09-21 NOTE — TELEPHONE ENCOUNTER
This is a nasal steroid and likely OTC and why not paid for.  I will notify the patient via MyChart of the issue and let him help us out.      No need to place prior auth.   Gladis

## 2018-09-22 NOTE — TELEPHONE ENCOUNTER
KAYLEY Camacho -     I don't know that those therapies are great - what is important is to keep working on balance to make sure that despite the nerves not working well in your feet, you are sure footed.  So, if they include balance exercises, that might be helpful. You can also do these at the gym, on those balance boards.     B vitamins are good for the nerves, so taking a complex B vitamin might be worth it - just the ones in the pharmacy.     Make sure to bring this up when I see you so we can make sure there is no testing we need to do.     Gladis Weinberg MD

## 2018-10-11 ENCOUNTER — OFFICE VISIT (OUTPATIENT)
Dept: FAMILY MEDICINE | Facility: CLINIC | Age: 73
End: 2018-10-11
Payer: COMMERCIAL

## 2018-10-11 VITALS
BODY MASS INDEX: 33.88 KG/M2 | OXYGEN SATURATION: 95 % | HEART RATE: 65 BPM | DIASTOLIC BLOOD PRESSURE: 82 MMHG | SYSTOLIC BLOOD PRESSURE: 142 MMHG | WEIGHT: 308.4 LBS | RESPIRATION RATE: 16 BRPM | TEMPERATURE: 97.9 F

## 2018-10-11 DIAGNOSIS — G60.3 IDIOPATHIC PROGRESSIVE POLYNEUROPATHY: ICD-10-CM

## 2018-10-11 DIAGNOSIS — I10 ESSENTIAL HYPERTENSION: Primary | ICD-10-CM

## 2018-10-11 LAB
BILIRUBIN UR: ABNORMAL
BLOOD UR: NEGATIVE
BUN SERPL-MCNC: 15.8 MG/DL (ref 7–21)
CALCIUM SERPL-MCNC: 9.6 MG/DL (ref 8.5–10.1)
CHLORIDE SERPLBLD-SCNC: 105.8 MMOL/L (ref 98–110)
CO2 SERPL-SCNC: 27.9 MMOL/L (ref 20–32)
CREAT SERPL-MCNC: 1 MG/DL (ref 0.7–1.3)
CREAT UR-MCNC: 329 MG/DL
GFR SERPL CREATININE-BSD FRML MDRD: 78.5 ML/MIN/1.7 M2
GLUCOSE SERPL-MCNC: 141.8 MG'DL (ref 70–99)
GLUCOSE URINE: NEGATIVE
HBA1C MFR BLD: 6 % (ref 4.1–5.7)
KETONES UR QL: ABNORMAL
LEUKOCYTE ESTERASE UR: NEGATIVE
MICROALBUMIN UR-MCNC: 134 MG/L
MICROALBUMIN/CREAT UR: 40.73 MG/G CR (ref 0–17)
NITRITE UR QL STRIP: NEGATIVE
PH UR STRIP: 6 [PH] (ref 5–7)
POTASSIUM SERPL-SCNC: 3.9 MMOL/DL (ref 3.3–4.5)
PROTEIN UR: ABNORMAL
SODIUM SERPL-SCNC: 140.9 MMOL/L (ref 132.6–141.4)
SP GR UR STRIP: 1.02
TSH SERPL DL<=0.005 MIU/L-ACNC: 1.54 MU/L (ref 0.4–4)
UROBILINOGEN UR STRIP-ACNC: ABNORMAL

## 2018-10-11 NOTE — Clinical Note
Another question... He has bilateral numbness (less tingling and no pain) along the bottom of the end of his feet - worse after exercising - could this be tarsal tunnel and how do I work that up? EMG not that great per [employer].  Gladis

## 2018-10-11 NOTE — MR AVS SNAPSHOT
After Visit Summary   10/11/2018    Doug Nieves    MRN: [id number]           Patient Information     Date Of Birth          1945        Visit Information        Provider Department      10/11/2018 9:00 AM Liliam Weinberg MD [facility]        Today's Diagnoses     Essential hypertension    -  1    Idiopathic progressive polyneuropathy          Care Instructions    Here is the plan from today's visit    1. Essential hypertension  Looking good!  - Basic Metabolic Panel ([facility])  - Urinalysis (UA) ([facility])  - Hemoglobin A1c ([facility])  - Albumin Random Urine Quantitative with Creat Ratio  - TSH    2. Idiopathic progressive polyneuropathy  I will get back to you on options for working this up  - [facility] - PT Referral; Future        Please call or return to clinic if your symptoms don't go away.    Follow up plan  6 months    Thank you for coming to [facility] today.  Lab Testing:  **If you had lab testing today and your results are reassuring or normal they will be mailed to you or sent through [employer] within 7 days.   **If the lab tests need quick action we will call you with the results.  The phone number we will call with results is # [phone] (home) . If this is not the best number please call our clinic and change the number.  Medication Refills:  If you need any refills please call your pharmacy and they will contact us.   If you need to  your refill at a new pharmacy, please contact the new pharmacy directly. The new pharmacy will help you get your medications transferred faster.   Scheduling:  If you have any concerns about today's visit or wish to schedule another appointment please call our office during normal business hours [phone] (8-5:00 M-F)  If a referral was made to a [facility] and you don't get a call from central scheduling please call [phone].  If a Mammogram was ordered for you at [facility]  call [phone] to schedule or change your appointment.  If you had an XRay/CT/Ultrasound/MRI ordered the number is [phone] to schedule or change your radiology appointment.   Medical Concerns:  If you have urgent medical concerns please call [phone] at any time of the day.  If you have a medical emergency please call 911.          Follow-ups after your visit        Additional Services     [facility] - PT Referral       If you have not heard from the scheduling office within 2 business days, please call [phone] for all locations, with the exception of [place], please call [phone] and [place], please call [phone].    Please be aware that coverage of these services is subject to the terms and limitations of your health insurance plan.  Call member services at your health plan with any benefit or coverage questions.                  Follow-up notes from your care team     Return in about 6 months (around 4/11/2019).      Future tests that were ordered for you today     Open Future Orders        Priority Expected Expires Ordered    [facility] - PT Referral Routine  10/11/2019 10/11/2018            Who to contact     Please call your clinic at [phone] to:    Ask questions about your health    Make or cancel appointments    Discuss your medicines    Learn about your test results    Speak to your doctor            Additional Information About Your Visit        HowDo Information     [employer]Do gives you secure access to your electronic health record. If you see a primary care provider, you can also send messages to your care team and make appointments. If you have questions, please call your primary care clinic.  If you do not have a primary care provider, please call [phone] and they will assist you.      HowDo is an electronic gateway that provides easy, online access to your medical records. With HowDo, you can request a clinic appointment, read your test results, renew  a prescription or communicate with your care team.     To access your existing account, please contact your [facility] or call [phone] for assistance.        Care EveryWhere ID     This is your Care EveryWhere ID. This could be used by other organizations to access your [place] medical records  [id number]        Your Vitals Were     Pulse Temperature Respirations Pulse Oximetry BMI (Body Mass Index)       65 97.9  F (36.6  C) (Oral) 16 95% 33.88 kg/m2        Blood Pressure from Last 3 Encounters:   10/11/18 142/82   06/21/18 147/88   05/24/18 145/87    Weight from Last 3 Encounters:   10/11/18 308 lb 6.4 oz (139.9 kg)   06/21/18 312 lb 3.2 oz (141.6 kg)   05/24/18 311 lb 9.6 oz (141.3 kg)              We Performed the Following     Albumin Random Urine Quantitative with Creat Ratio     Basic Metabolic Panel ([facility])     Hemoglobin A1c ([facility])     TSH     Urinalysis (UA) ([facility])        Primary Care Provider Office Phone # Fax #    Liliam Weinberg [phone]       [address] [address]        Equal Access to Services     BRANDON LOGAN : Hadii adeline levio Socaty, waaxda lutaiwoadaha, qaybta kaalmada adegarcíayada, nichole sanchez. So [facility] [phone].    ATENCIÓN: Si habla español, tiene a hawk disposición servicios gratuitos de asistencia lingüística. Llame al [phone].    We comply with applicable federal civil rights laws and [place] laws. We do not discriminate on the basis of race, color, national origin, age, disability, sex, sexual orientation, or gender identity.            Thank you!     Thank you for choosing [facility]  for your care. Our goal is always to provide you with excellent care. Hearing back from our patients is one way we can continue to improve our services. Please take a few minutes to complete the written survey that you may receive in the mail after your  visit with us. Thank you!             Your Updated Medication List - Protect others around you: Learn how to safely use, store and throw away your medicines at www.disposemymeds.org.          This list is accurate as of 10/11/18  9:47 AM.  Always use your most recent med list.                   Brand Name Dispense Instructions for use Diagnosis    amLODIPine 5 MG tablet    NORVASC    90 tablet    Take 1 tablet (5 mg) by mouth daily    Essential hypertension       ASPIRIN LOW DOSE 81 MG tablet   Generic drug:  aspirin      Take 1 tablet by mouth daily.        atorvastatin 40 MG tablet    LIPITOR    90 tablet    Take 1 tablet (40 mg) by mouth daily    Essential hypertension       Blood Glucose Monitoring Suppl Génesis      Use as directed        enalapril 20 MG tablet    VASOTEC    180 tablet    Take 2 tablets (40 mg) by mouth daily    Benign essential hypertension       guaiFENesin 600 MG 12 hr tablet    MUCINEX     Take 400 mg by mouth 2 times daily        hydrochlorothiazide 25 MG tablet    HYDRODIURIL    90 tablet    Take 1 tablet (25 mg) by mouth daily    Benign essential hypertension       loratadine 10 MG tablet    CLARITIN     Take 10 mg by mouth daily        omeprazole 20 MG CR capsule    priLOSEC    90 capsule    Take 1 capsule (20 mg) by mouth daily    Gastroesophageal reflux disease without esophagitis       order for DME     1 Units    Blood pressure cuff    Benign essential hypertension       triamcinolone 0.1 % cream    KENALOG    30 g    Apply sparingly to affected area three times daily for 14 days.    Chronic eczema

## 2018-10-11 NOTE — PROGRESS NOTES
NEGIN       Doug is a 72 year old  male  who presents:    Chief Complaint   Patient presents with     RECHECK     BP Follow up/ wants to talk about Neuropathy     BP - doing OK.  No change in exercise tolerance, CP or SOB or edema. BPs he brings in are all less than 140/90 and many in the 120/80 range.        Neuropathy- seems to be more of an issue. Coolness of toes when goes to sleep and also a numbness. Seems to becoming more of an issue. Noticed it more in late summer when exercises. Always walks a mile indoors and then notices that he can't feel the front of his feet and it startles him.  Has been reading on this and thought possibly related to blood sugar. One of his brothers with DM has the same symptoms. ? Related to the back.   Also some fear of falling.   Cold toes has been there for at least 5 years.   All the toes to the ball of his foot, both sides, equal. Bottom is worse than the top - is not numb    ROS: no back pain, fine since 2012.  No leg weakness.     Kirti(wife) notes that he is thinking well.     Patient Active Problem List   Diagnosis     Lumbar radiculopathy     Chronic kidney disease, stage II (mild)     Diverticulosis of large intestine     Sebaceous cyst     Esophageal reflux     Essential hypertension     Open angle with borderline findings, low risk     Benign neoplasm of colon     Rosacea     Other seborrheic keratosis     Shoulder joint pain     Dermatofibroma of lower extremity     Hyperlipidemia LDL goal <100     Prediabetes       Current Outpatient Prescriptions   Medication Sig Dispense Refill     amLODIPine (NORVASC) 5 MG tablet Take 1 tablet (5 mg) by mouth daily 90 tablet 3     aspirin (ASPIRIN LOW DOSE) 81 MG tablet Take 1 tablet by mouth daily.       atorvastatin (LIPITOR) 40 MG tablet Take 1 tablet (40 mg) by mouth daily 90 tablet 3     Blood Glucose Monitoring Suppl GUERDA Use as directed       enalapril (VASOTEC) 20 MG tablet Take 2 tablets (40 mg) by mouth daily 180  tablet 1     hydrochlorothiazide (HYDRODIURIL) 25 MG tablet Take 1 tablet (25 mg) by mouth daily 90 tablet 3     loratadine (CLARITIN) 10 MG tablet Take 10 mg by mouth daily       omeprazole (PRILOSEC) 20 MG CR capsule Take 1 capsule (20 mg) by mouth daily 90 capsule 3     order for DME Blood pressure cuff 1 Units 0     triamcinolone (KENALOG) 0.1 % cream Apply sparingly to affected area three times daily for 14 days. 30 g 0     guaiFENesin (MUCINEX) 600 MG 12 hr tablet Take 400 mg by mouth 2 times daily           No Known Allergies             Review of Systems:               Physical Exam:     Vitals:    10/11/18 0859   BP: 142/82   Pulse: 65   Resp: 16   Temp: 97.9  F (36.6  C)   TempSrc: Oral   SpO2: 95%   Weight: 308 lb 6.4 oz (139.9 kg)     Body mass index is 33.88 kg/(m^2).  Vitals were reviewed and were normal except for BP  GENERAL: healthy, alert, well nourished, well hydrated, no distress  RESP: lungs clear to auscultation - no rales, no rhonchi, no wheezes  CV: regular rates and rhythm, normal S1 S2, no S3 or S4 and no murmur, no click or rub -  MS: extremities- no gross deformities noted, no edema  FEET: has decreased proprioception of first toe and also decreased vibratory sense.  Nl pulses.  Nl hair growth.      Office Visit on 06/21/2018   Component Date Value Ref Range Status     Copath Report 06/21/2018    Final                    Value:Patient Name: SCOTTY SERNA  MR#: [id number]  Specimen #: [id number]  Collected: 6/21/2018  Received: 6/22/2018  Reported: 6/28/2018 11:27  Ordering Phy(s): PASTORA GUERRERO    For improved result formatting, select 'View Enhanced Report Format' under   Linked Documents section.    SPECIMEN(S):  Skin, mid abdomen    FINAL DIAGNOSIS:  Skin lesion, mid abdomen, shave biopsy:  - Compound nevus with mild atypia.    COMMENT:  Intradepartmental consultation concurs with the diagnosis.    Electronically signed out by:    Celestino Soto M.D.    CLINICAL HISTORY:  A 1  "cm mole on mid abdomen.    GROSS:  A single specimen container with formalin is received labeled with the   patient's name, date of birth, and  medical record number. Information on the requisition slip, container, and   associated labels is confirmed.    The specimen is designated \"skin midabdomen \" consisting of a 1 x 0.7 cm   tan skin shave biopsy.  Central on  the skin surface is a 0.7 x 0.5 cm poorly demarcated dark bro                          wn macule.    The resection margin is inked black.  The tips are shaved and submitted. The remaining skin is serially   sectioned into three pieces and entirely  submitted. Summary of Sections:  1 - tips  2 - central cross sections (Dictated by: Giselle Kennedy 6/22/2018 11:35 AM)    MICROSCOPIC:  Microscopic examination is performed.  Immunohistochemical antibody assay   for Sox-10 is performed and supports  the diagnosis.  The immunohistochemical control reacts appropriately.    Multiple deeper tissue levels are also  examined microscopically.    CPT Codes:  A: 44237-HT4, 51103-TFV    TESTING LAB LOCATION:  [address] [address] [phone]    COLLECTION SITE:  Client: [facility]  Location: [facility] ()           Assessment and Plan     Doug was seen today for recheck.    Diagnoses and all orders for this visit:    Essential hypertension - is controlled by his home numbers. His renal function is holding steady as his albinuria.  On 3 drugs for his BP control and very active.    -     Basic Metabolic Panel ([facility])  -     Urinalysis (UA) ([facility])  -     Hemoglobin A1c ([facility])  -     Albumin Random Urine Quantitative with Creat Ratio  -     TSH    Idiopathic progressive polyneuropathy - reviewed what the differential is with him and he does not think this is his back, could be due to his pre-diabetes but it is along the plantar surface of the foot more " than the top and so could have some tarsal tunnel component. Is worse with activity.  Will talk to my sport med colleague to see whether EMG might help diagnose.  He does not wear tight shoes when exercising.  Could also be idiopathic polyneuropathy with the changes in proprioception. Will have him see balance center to work on maintaining this - he is worried about falls.   -     [facility] - PT Referral; Future    Total time: 25 minutes with more than half spent counseling on feet and BP mgmt    There are no discontinued medications.    Options for treatment and follow-up care were reviewed with the patient . Doug Nieves  engaged in the decision making process and verbalized understanding of the options discussed and agreed with the final plan.    Liliam Weinberg MD

## 2018-10-17 ENCOUNTER — HOSPITAL ENCOUNTER (OUTPATIENT)
Dept: PHYSICAL THERAPY | Facility: CLINIC | Age: 73
Setting detail: THERAPIES SERIES
End: 2018-10-17
Attending: FAMILY MEDICINE
Payer: COMMERCIAL

## 2018-10-17 DIAGNOSIS — G60.3 IDIOPATHIC PROGRESSIVE POLYNEUROPATHY: ICD-10-CM

## 2018-10-17 PROCEDURE — 40000719 ZZHC STATISTIC PT DEPARTMENT NEURO VISIT: Performed by: PHYSICAL THERAPIST

## 2018-10-17 PROCEDURE — G8978 MOBILITY CURRENT STATUS: HCPCS | Mod: GP,CJ | Performed by: PHYSICAL THERAPIST

## 2018-10-17 PROCEDURE — 97116 GAIT TRAINING THERAPY: CPT | Mod: GP | Performed by: PHYSICAL THERAPIST

## 2018-10-17 PROCEDURE — G8979 MOBILITY GOAL STATUS: HCPCS | Mod: GP,CI | Performed by: PHYSICAL THERAPIST

## 2018-10-17 PROCEDURE — 97161 PT EVAL LOW COMPLEX 20 MIN: CPT | Mod: GP | Performed by: PHYSICAL THERAPIST

## 2018-10-17 PROCEDURE — 97112 NEUROMUSCULAR REEDUCATION: CPT | Mod: GP | Performed by: PHYSICAL THERAPIST

## 2018-10-17 NOTE — IP AVS SNAPSHOT
MRN:[id number]                      After Visit Summary   10/17/2018    Doug Nieves    MRN: [id number]           Visit Information        Provider Department      10/17/2018  3:30 PM Amber Arellano, PT [facility], [place], Physical Therapy - Outpatient          Further instructions from your care team       10/17/18    Continue pedicures    Stretch toes toward you - daily.  30 sec - THEN do active toe mov't.  Reji toward you mov't.  (TV time)    See attached for OFF days for balance     Consider a pole  (Marian Hernandez.  [employer].  Tried Leki here.  Don king is at [facility].)      Amber Arellano PT  [email or web address]  [phone]  Pager [phone]  :  [phone]    MyChart Information     [employer] gives you secure access to your electronic health record. If you see a primary care provider, you can also send messages to your care team and make appointments. If you have questions, please call your primary care clinic.  If you do not have a primary care provider, please call [phone] and they will assist you.        Care EveryWhere ID     This is your Care EveryWhere ID. This could be used by other organizations to access your [place] medical records  [id number]        Equal Access to Services     BRANDON LOGAN : Saniya levio Socaty, waaxda luqadaha, qaybta kaalmada adeegyada, nichole sanchez. So [facility] [phone].    ATENCIÓN: Si habla español, tiene a hawk disposición servicios gratuitos de asistencia lingüística. Llame al [phone].    We comply with applicable federal civil rights laws and [place] laws. We do not discriminate on the basis of race, color, national origin, age, disability, sex, sexual orientation, or gender identity.

## 2018-10-17 NOTE — PROGRESS NOTES
10/17/18 1500   General Information   Start of Care Date 10/17/18   Referring Physician Liliam Weinberg   Orders Evaluate and Treat as Indicated   Order Date 10/11/18   Medical Diagnosis idiopathic poly neuropathy   Surgical/Medical history reviewed Yes   Pertinent history of current problem (include personal factors and/or comorbidities that impact the POC) 2 yr onset.  walks one mile track [facility].  noticed he has less feeling feet more on L.  toes feel cold when in bed.  toes juliane.  just at night.  Falls  none.  aware of falls risk.  fell 5 yr ago feb '13, slipped on ice.  L knee quad rupture.  surgery.  never want to fall again.  it happened at home.     Pertinent Visual History  no issues   Prior level of functional mobility Ambulation   Ambulation w/out device.  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  for exer, does 2 sets of steps.     Patient/Family Goals Statement find out if i can slow progression of neuropathy.  want to keep doing diet and exer.     General Information Comments walks 1 mile at [facility]. does it WEd / fri/ Sun.  30 min on exer bike.  does 8 exercises.  core, gluts, hips.  UE 1 to 2 lb due to L shoulder tear.  does quad exer and leg press.  1.5 hours 3 x / wk.     Fall Risk Screen   Fall screen completed by PT   Have you fallen 2 or more times in the past year? No   Timed Up and Go score (seconds) 11 sec   Fall screen comments fear of falling   Pain   Patient currently in pain No   Cognitive Status Examination   Orientation orientation to person, place and time   Level of Consciousness alert   Follows Commands and Answers Questions 100% of the time   Personal Safety and Judgment intact   Memory intact   Integumentary   Integumentary Comments healed incision ant L  "knee from 2013 surgery   Posture   Posture Forward head position   Range of Motion (ROM)   ROM Comment toes lack 25% from full ext bilat   Strength   Strength Comments wfls   Bed Mobility   Bed Mobility Comments indep   Transfer Skills   Transfer Comments uses hands to rise from chair.  tall man 6'6\"?   Gait Special Tests   Gait Special Tests 25 FOOT TIMED WALK;DYNAMIC GAIT INDEX   Gait Special Tests 25 Foot Timed Walk   Seconds 8.6   Steps 13 Steps   Gait Special Tests Dynamic Gait Index   Score out of 24 16   Balance Special Tests   Balance Special Tests Single leg stance right;Single leg stance left;Modified CTSIB Conditions   Balance Special Tests Single Leg Stance Right,   Right, seconds 4 Seconds   Balance Special Tests Single Leg Stance Left   Left, seconds 4 Seconds   Balance Special Tests Modified CTSIB Conditions   Condition 1, seconds 30 Seconds   Condition 2, seconds 20 Seconds   Condition 4, seconds 30 Seconds   Condition 5, seconds 5 Seconds   Modified CTSIB Comments 85/120   Sensory Examination   Sensory Perception other (describe)   Sensory Perception Comments monofilament lacking sensation bottoms of both sets of toes and ball of feet.  difficulty sensing when vibration stopped.    Coordination   Coordination no deficits were identified   Muscle Tone   Muscle Tone no deficits were identified   Planned Therapy Interventions   Planned Therapy Interventions balance training;gait training;neuromuscular re-education;ROM;strengthening;stretching;transfer training   Clinical Impression   Criteria for Skilled Therapeutic Interventions Met yes, treatment indicated   PT Diagnosis imbalance; decreased sensation   Influenced by the following impairments above   Functional limitations due to impairments gait difficulty   Clinical Presentation Stable/Uncomplicated   Clinical Decision Making (Complexity) Low complexity   Therapy Frequency other (see comments)   Predicted Duration of Therapy Intervention (days/wks) " up to 8x through 12/31/18   Risk & Benefits of therapy have been explained Yes   Patient, Family & other staff in agreement with plan of care Yes   Clinical Impression Comments 73 yo male w/ mild neuropathy- gait difficulty   Education Assessment   Preferred Learning Style Demonstration;Listening   Barriers to Learning Physical   GOALS   PT Eval Goals 1;2;3;4   Goal 1   Goal Identifier gait   Goal Description DGI score to improve to 19+ or more for safer community amb.    Target Date 12/31/18   Goal 2   Goal Identifier gait   Goal Description tug score to improve to 8 sec or less for decreased fall risk   Target Date 12/31/18   Goal 3   Goal Identifier HEP   Goal Description pt to be indep w/ a home exer program to incl balance to add to his wellness program   Target Date 12/31/18   Goal 4   Goal Identifier floor tx   Goal Description pt to be indep w/ a floor tx w/ use of UE's /furniture.    Target Date 12/31/18   Total Evaluation Time   Total Evaluation Time (Minutes) 32

## 2018-10-17 NOTE — DISCHARGE INSTRUCTIONS
10/17/18    Continue pedicures    Stretch toes toward you - daily.  30 sec - THEN do active toe mov't.  Reji toward you mov't.  (TV time)    See attached for OFF days for balance     Consider a pole  (Nedra Hernandez.  Tried Leki here.  Black fernando is at [place].)      [email or web address]  [phone]  Pager [phone]  :  [phone]

## 2018-10-29 ENCOUNTER — MYC MEDICAL ADVICE (OUTPATIENT)
Dept: FAMILY MEDICINE | Facility: CLINIC | Age: 73
End: 2018-10-29

## 2018-11-13 ENCOUNTER — HOSPITAL ENCOUNTER (OUTPATIENT)
Dept: PHYSICAL THERAPY | Facility: CLINIC | Age: 73
Setting detail: THERAPIES SERIES
End: 2018-11-13
Attending: FAMILY MEDICINE
Payer: COMMERCIAL

## 2018-11-13 DIAGNOSIS — I10 ESSENTIAL HYPERTENSION: ICD-10-CM

## 2018-11-13 PROCEDURE — 97530 THERAPEUTIC ACTIVITIES: CPT | Mod: GP | Performed by: PHYSICAL THERAPIST

## 2018-11-13 PROCEDURE — 97112 NEUROMUSCULAR REEDUCATION: CPT | Mod: GP | Performed by: PHYSICAL THERAPIST

## 2018-11-13 PROCEDURE — 40000719 ZZHC STATISTIC PT DEPARTMENT NEURO VISIT: Performed by: PHYSICAL THERAPIST

## 2018-11-13 RX ORDER — AMLODIPINE BESYLATE 5 MG/1
5 TABLET ORAL DAILY
Qty: 90 TABLET | Refills: 3 | Status: SHIPPED | OUTPATIENT
Start: 2018-11-13 | End: 2019-02-25

## 2018-11-13 NOTE — TELEPHONE ENCOUNTER

## 2018-11-20 ENCOUNTER — HOSPITAL ENCOUNTER (OUTPATIENT)
Dept: PHYSICAL THERAPY | Facility: CLINIC | Age: 73
Setting detail: THERAPIES SERIES
End: 2018-11-20
Attending: FAMILY MEDICINE
Payer: COMMERCIAL

## 2018-11-20 PROCEDURE — 97112 NEUROMUSCULAR REEDUCATION: CPT | Mod: GP | Performed by: PHYSICAL THERAPIST

## 2018-11-20 PROCEDURE — 97116 GAIT TRAINING THERAPY: CPT | Mod: GP | Performed by: PHYSICAL THERAPIST

## 2018-11-20 PROCEDURE — 40000719 ZZHC STATISTIC PT DEPARTMENT NEURO VISIT: Performed by: PHYSICAL THERAPIST

## 2018-11-27 ENCOUNTER — HOSPITAL ENCOUNTER (OUTPATIENT)
Dept: PHYSICAL THERAPY | Facility: CLINIC | Age: 73
Setting detail: THERAPIES SERIES
End: 2018-11-27
Attending: FAMILY MEDICINE
Payer: COMMERCIAL

## 2018-11-27 PROCEDURE — 40000719 ZZHC STATISTIC PT DEPARTMENT NEURO VISIT: Performed by: PHYSICAL THERAPIST

## 2018-11-27 PROCEDURE — 97530 THERAPEUTIC ACTIVITIES: CPT | Mod: GP | Performed by: PHYSICAL THERAPIST

## 2018-11-27 PROCEDURE — 97116 GAIT TRAINING THERAPY: CPT | Mod: GP | Performed by: PHYSICAL THERAPIST

## 2018-12-04 ENCOUNTER — HOSPITAL ENCOUNTER (OUTPATIENT)
Dept: PHYSICAL THERAPY | Facility: CLINIC | Age: 73
Setting detail: THERAPIES SERIES
End: 2018-12-04
Attending: FAMILY MEDICINE
Payer: COMMERCIAL

## 2018-12-04 PROCEDURE — 40000719 ZZHC STATISTIC PT DEPARTMENT NEURO VISIT: Performed by: PHYSICAL THERAPIST

## 2018-12-04 PROCEDURE — 97530 THERAPEUTIC ACTIVITIES: CPT | Mod: GP | Performed by: PHYSICAL THERAPIST

## 2018-12-04 PROCEDURE — 97116 GAIT TRAINING THERAPY: CPT | Mod: GP,XU | Performed by: PHYSICAL THERAPIST

## 2018-12-04 PROCEDURE — 97112 NEUROMUSCULAR REEDUCATION: CPT | Mod: GP | Performed by: PHYSICAL THERAPIST

## 2018-12-19 NOTE — ADDENDUM NOTE
Encounter addended by: Amber Arellano, PT on: 12/19/2018 4:22 PM   Actions taken: Episode edited, Flowsheet accepted, Charge Capture section accepted

## 2018-12-20 ENCOUNTER — HOSPITAL ENCOUNTER (OUTPATIENT)
Dept: PHYSICAL THERAPY | Facility: CLINIC | Age: 73
Setting detail: THERAPIES SERIES
End: 2018-12-20
Attending: FAMILY MEDICINE
Payer: COMMERCIAL

## 2018-12-20 PROCEDURE — 97530 THERAPEUTIC ACTIVITIES: CPT | Mod: GP | Performed by: PHYSICAL THERAPIST

## 2018-12-20 NOTE — PROGRESS NOTES
18 1200   Signing Clinician's Name / Credentials   Signing clinician's name / credentials Amber Arellano PT   Session Number   Session Number 6 [facility]   Goal 1   Goal Identifier gait   Goal Description DGI score to improve to 19+ or more for safer community amb.    Target Date 18   Date Met 18   Goal 2   Goal Identifier gait   Goal Description tug score to improve to 8 sec or less for decreased fall risk   Target Date 19   Goal 3   Goal Identifier HEP   Goal Description pt to be indep w/ a home exer program to incl balance to add to his wellness program   Target Date 18   Date Met 18   Goal 4   Goal Identifier floor tx   Goal Description pt to be indep w/ a floor tx w/ use of UE's /furniture.    Target Date 18   Date Met 18   Goal 5   Goal Identifier gait   Goal Description FGA score to improve to 26 or better on 30 point test to show improved gait quality.    Target Date 19   Subjective Report   Subjective Report it is going well.  one glitch,  pm- had a good work out but R hip pain.  did not do mon/tues workout.  walked almost 8 miles in 2 wk.  [employer] closes next week.  i will do bike.  not going out of town.  sister in law  last yr.  R hip is ok. no pain.  no foot swelling. (R).  using brown shoes good for outside.  i wear the better shoes when i walk.     Objective Measure 2   Objective Measure SLS   Details 10L  5 to6 on R   Objective Measure 3   Objective Measure DGI   Details 22   Objective Measure 4   Objective Measure TUG   Details 14.5 cog / 11.4 non cog   Objective Measure 5   Objective Measure 25ft walk   Details 9.2sec 15 steps   Objective Measure 6   Objective Measure FGA   Details 25/30   Therapeutic Activity   Skilled Intervention tests above and education/ practice gait in various directions   Patient Response mark well; improving.    Treatment Detail see tests above.  practiced forw/back/side stepping near horiz rail w/ sba.      "educated pt re; progress.  discussed new goals and probable 2 more sessions.  he agrees and will cont HEP consistently.     Progress improving test results.   Therapeutic Activities: dynamic activities to improve functional performance minutes (05654) 42   Education   Learner Patient   Readiness Acceptance;Eager   Method Explanation   Response Verbalizes Understanding   Education Comments cont HEP.     Plan   Homework above   Home program vhi exer. toes stretch, toe arom, EC stand feet 2\" apart, and SLS. added stand on foam.    Plan for next session see pt early january   Comments   Comments Tha is here alone   Total Session Time   Timed Code Treatment Minutes 42   Total Treatment Time (sum of timed and untimed services) 42     "

## 2018-12-27 DIAGNOSIS — J30.2 SEASONAL ALLERGIC RHINITIS, UNSPECIFIED TRIGGER: Primary | ICD-10-CM

## 2018-12-27 RX ORDER — LORATADINE 10 MG/1
10 TABLET ORAL DAILY
Qty: 90 TABLET | Refills: 3 | Status: SHIPPED | OUTPATIENT
Start: 2018-12-27 | End: 2020-08-28

## 2018-12-27 NOTE — TELEPHONE ENCOUNTER

## 2019-01-08 ENCOUNTER — HOSPITAL ENCOUNTER (OUTPATIENT)
Dept: PHYSICAL THERAPY | Facility: CLINIC | Age: 74
Setting detail: THERAPIES SERIES
End: 2019-01-08
Attending: FAMILY MEDICINE
Payer: COMMERCIAL

## 2019-01-08 PROCEDURE — 97530 THERAPEUTIC ACTIVITIES: CPT | Mod: GP | Performed by: PHYSICAL THERAPIST

## 2019-01-15 NOTE — PROGRESS NOTES
01/08/19 1400   Signing Clinician's Name / Credentials   Signing clinician's name / credentials Amber Figueroajannette PT   Session Number   Session Number 7 [facility]   Goal 1   Goal Description DGI score to improve to 19+ or more for safer community amb.    Target Date 12/31/18   Date Met 12/20/18   Goal Identifier gait   Goal 2   Goal Description tug score to improve to 8 sec or less for decreased fall risk   Target Date 02/22/19   Goal Identifier gait   Date Met 01/08/19   Goal 3   Goal Description pt to be indep w/ a home exer program to incl balance to add to his wellness program   Target Date 12/31/18   Date Met 12/20/18   Goal Identifier HEP   Goal 4   Goal Description pt to be indep w/ a floor tx w/ use of UE's /furniture.    Target Date 12/31/18   Date Met 12/20/18   Goal Identifier floor tx   Goal 5   Goal Description FGA score to improve to 26 or better on 30 point test to show improved gait quality.    Target Date 02/22/19   Goal Identifier gait/ nearly met   Subjective Report   Subjective Report Tha is here for last session for now.  reports:  we were both sick around holidays  but i walked 8x in 2 wk.  i have sinus and throat thing.    Objective Measure 2   Objective Measure SLS   Details 6 to 10 sec bilat w/ multiple attempts   Objective Measure 3   Objective Measure DGI   Details 22   Objective Measure 4   Objective Measure TUG   Details 8.9   Objective Measure 5   Objective Measure 25ft walk   Details 7.4 sec 14 steps   Objective Measure 6   Objective Measure FGA   Details 25   Vitals Signs   Heart Rate 61   Blood Pressure 147/72   Therapeutic Activity   Therapeutic Activities: dynamic activities to improve functional performance minutes (18513) 44   Skilled Intervention retests as above.  education   Patient Response mark well   Treatment Detail improved tests as noted above.  4 of 5 goals met. reviewed verbally his home exer program, he goes to gym MWF, does walking in basement of building 2 to 3 days per  week so is exercising 5 to 6 days a week. rec he continue this.  educ pt in improvements above, stressed importance of exercise and wt control. he agrees.   Progress excellent progress, pt is happy w/ changes. he is working hard at gym and walking in his big [employer] garage.  gait quality improved.    Education   Learner Patient   Readiness Acceptance;Eager   Method Explanation;Demonstration   Response Verbalizes Understanding;Demonstrates Understanding   Education Comments cont HEP, cont walking 5+ times/week   Plan   Homework above   Updates to plan of care all goals met except fga but nearly met, very motivated, exercising regularly   Plan for next session d/c w/ home exer program   Comments   Comments Tha is here alone, very motivated   Total Session Time   Timed Code Treatment Minutes 44   Total Treatment Time (sum of timed and untimed services) 44

## 2019-01-15 NOTE — PROGRESS NOTES
Outpatient Physical Therapy Discharge Note     Patient: Doug Nieves  : 1945    Beginning/End Dates of Reporting Period:  10/17/18 to 1/15/2019    Referring Provider: SURYA Weinberg    Therapy Diagnosis: imbalance     Client Self Report: Tha is here for last session for now.  reports:  we were both sick around holidays  but i walked 8x in 2 wk.  i have sinus and throat thing.     Objective Measurements:     Objective Measure: SLS  Details: 6 to 10 sec bilat w/ multiple attempts  Objective Measure: DGI  Details: 22  Objective Measure: TUG  Details: 8.9  Objective Measure: 25ft walk  Details: 7.4 sec 14 steps  Objective Measure: FGA  Details: 25      Goals:  Goal Identifier gait   Goal Description DGI score to improve to 19+ or more for safer community amb.    Target Date 18   Date Met  18   Progress:     Goal Identifier gait   Goal Description tug score to improve to 8 sec or less for decreased fall risk   Target Date 19   Date Met  19   Progress:     Goal Identifier HEP   Goal Description  pt to be indep w/ a home exer program to incl balance to add to his wellness program   Target Date 18   Date Met  18   Progress:     Goal Identifier floor tx   Goal Description pt to be indep w/ a floor tx w/ use of UE's /furniture.    Target Date 18   Date Met  18   Progress:     Goal Identifier gait/ nearly met   Goal Description FGA score to improve to 26 or better on 30 point test to show improved gait quality.    Target Date 19   Date Met      Progress: pt scored 25 on fga, improved.  No cane indoors, does use pole at times when walking outside on uneven surfaces.       Progress Toward Goals:   Progress this reporting period: excellent - now doing HEP 5 to 6 days a week. Balance / gait have improved.   Recommend good shoe wear as he tends to pronate.     Plan:  Discharge from therapy.    Discharge: yes    Reason for Discharge: Patient has met all goals except goal 5  above.    Equipment Issued: band    Discharge Plan: Patient to continue home program at [facility] gym and walking in basement of his building.

## 2019-01-15 NOTE — ADDENDUM NOTE
Encounter addended by: Amber Arellano, PT on: 1/15/2019 1:09 PM   Actions taken: Flowsheet data copied forward, Flowsheet accepted, Sign clinical note, Episode resolved

## 2019-02-04 ENCOUNTER — MYC REFILL (OUTPATIENT)
Dept: FAMILY MEDICINE | Facility: CLINIC | Age: 74
End: 2019-02-04

## 2019-02-04 DIAGNOSIS — I10 ESSENTIAL HYPERTENSION: ICD-10-CM

## 2019-02-04 NOTE — TELEPHONE ENCOUNTER

## 2019-02-05 RX ORDER — ATORVASTATIN CALCIUM 40 MG/1
40 TABLET, FILM COATED ORAL DAILY
Qty: 90 TABLET | Refills: 3 | Status: SHIPPED | OUTPATIENT
Start: 2019-02-05 | End: 2020-02-25

## 2019-02-18 ENCOUNTER — MYC REFILL (OUTPATIENT)
Dept: FAMILY MEDICINE | Facility: CLINIC | Age: 74
End: 2019-02-18

## 2019-02-18 DIAGNOSIS — K21.9 GASTROESOPHAGEAL REFLUX DISEASE WITHOUT ESOPHAGITIS: ICD-10-CM

## 2019-02-25 ENCOUNTER — MYC REFILL (OUTPATIENT)
Dept: FAMILY MEDICINE | Facility: CLINIC | Age: 74
End: 2019-02-25

## 2019-02-25 DIAGNOSIS — I10 ESSENTIAL HYPERTENSION: ICD-10-CM

## 2019-02-25 RX ORDER — AMLODIPINE BESYLATE 5 MG/1
5 TABLET ORAL DAILY
Qty: 90 TABLET | Refills: 3 | Status: SHIPPED | OUTPATIENT
Start: 2019-02-25 | End: 2019-12-11

## 2019-03-05 ENCOUNTER — MYC REFILL (OUTPATIENT)
Dept: FAMILY MEDICINE | Facility: CLINIC | Age: 74
End: 2019-03-05

## 2019-03-05 DIAGNOSIS — I10 BENIGN ESSENTIAL HYPERTENSION: ICD-10-CM

## 2019-03-06 RX ORDER — HYDROCHLOROTHIAZIDE 25 MG/1
25 TABLET ORAL DAILY
Qty: 90 TABLET | Refills: 3 | Status: SHIPPED | OUTPATIENT
Start: 2019-03-06 | End: 2019-12-11

## 2019-04-01 ENCOUNTER — MYC REFILL (OUTPATIENT)
Dept: FAMILY MEDICINE | Facility: CLINIC | Age: 74
End: 2019-04-01

## 2019-04-01 DIAGNOSIS — I10 BENIGN ESSENTIAL HYPERTENSION: ICD-10-CM

## 2019-04-01 RX ORDER — ENALAPRIL MALEATE 20 MG/1
40 TABLET ORAL DAILY
Qty: 180 TABLET | Refills: 1 | Status: SHIPPED | OUTPATIENT
Start: 2019-04-01 | End: 2019-10-08

## 2019-06-04 ENCOUNTER — OFFICE VISIT (OUTPATIENT)
Dept: FAMILY MEDICINE | Facility: CLINIC | Age: 74
End: 2019-06-04
Payer: COMMERCIAL

## 2019-06-04 VITALS
DIASTOLIC BLOOD PRESSURE: 75 MMHG | OXYGEN SATURATION: 97 % | BODY MASS INDEX: 35.15 KG/M2 | SYSTOLIC BLOOD PRESSURE: 148 MMHG | HEIGHT: 78 IN | HEART RATE: 64 BPM | TEMPERATURE: 98.5 F | WEIGHT: 303.8 LBS

## 2019-06-04 DIAGNOSIS — R73.03 PREDIABETES: ICD-10-CM

## 2019-06-04 DIAGNOSIS — R35.1 NOCTURIA: ICD-10-CM

## 2019-06-04 DIAGNOSIS — I10 ESSENTIAL HYPERTENSION: ICD-10-CM

## 2019-06-04 DIAGNOSIS — C44.310 BCC (BASAL CELL CARCINOMA), FACE: ICD-10-CM

## 2019-06-04 DIAGNOSIS — Z13.9 SCREENING FOR CONDITION: ICD-10-CM

## 2019-06-04 DIAGNOSIS — Z00.00 MEDICARE ANNUAL WELLNESS VISIT, SUBSEQUENT: Primary | ICD-10-CM

## 2019-06-04 LAB
BUN SERPL-MCNC: 12 MG/DL (ref 7–21)
CALCIUM SERPL-MCNC: 9.2 MG/DL (ref 8.5–10.1)
CHLORIDE SERPLBLD-SCNC: 102.6 MMOL/L (ref 98–110)
CO2 SERPL-SCNC: 27 MMOL/L (ref 20–32)
CREAT SERPL-MCNC: 0.9 MG/DL (ref 0.7–1.3)
CREAT UR-MCNC: 145 MG/DL
GFR SERPL CREATININE-BSD FRML MDRD: 88.4 ML/MIN/1.7 M2
GLUCOSE SERPL-MCNC: 137.9 MG'DL (ref 70–99)
HBA1C MFR BLD: 5.8 % (ref 4.1–5.7)
MICROALBUMIN UR-MCNC: 71 MG/L
MICROALBUMIN/CREAT UR: 48.76 MG/G CR (ref 0–17)
POTASSIUM SERPL-SCNC: 4 MMOL/DL (ref 3.3–4.5)
SODIUM SERPL-SCNC: 136.4 MMOL/L (ref 132.6–141.4)

## 2019-06-04 ASSESSMENT — MIFFLIN-ST. JEOR: SCORE: 2266.28

## 2019-06-04 NOTE — PATIENT INSTRUCTIONS
Personalized Prevention Plan  You are due for the preventive services outlined below.  Your care team is available to assist you in scheduling these services.  If you have already completed any of these items, please share that information with your care team to update in your medical record.  Health Maintenance Due   Topic Date Due     Diptheria Tetanus Pertussis (DTAP/TDAP/TD) Vaccine (1 - Tdap) 03/06/2009     PHQ-2  01/01/2019     Zoster (Shingles) Vaccine (3 of 3) 04/05/2019     Annual Wellness Visit  05/24/2019     Here is the plan from today's visit    1. Screening for condition  Diabetes number is better!!!1  - Hemoglobin A1c ([facility])  - Basic Metabolic Panel ([facility])  - Albumin Random Urine Quantitative with Creat Ratio    2. Medicare annual wellness visit, subsequent  You are on track.     3. Prediabetes  Much better    4. Urination at night  Plan is to drop the hydrochlorothiazide to 12.5 mg a day. See if your urination is better when you do that - record the number.  Also record how your blood pressure is on the lower dose. If you are still having night time urination, then stop the hydrochlorothiazide totally and get back to me on how you are doing with the urination and your blood pressure.       Please call or return to clinic if your symptoms don't go away.    Follow up plan  6 months    Thank you for coming to [facility] today.  Lab Testing:  **If you had lab testing today and your results are reassuring or normal they will be mailed to you or sent through [employer] within 7 days.   **If the lab tests need quick action we will call you with the results.  The phone number we will call with results is # [phone] (home) . If this is not the best number please call our clinic and change the number.  Medication Refills:  If you need any refills please call your pharmacy and they will contact us.   If you need to  your refill at a new pharmacy, please contact the new pharmacy directly.  The new pharmacy will help you get your medications transferred faster.   Scheduling:  If you have any concerns about today's visit or wish to schedule another appointment please call our office during normal business hours [phone] (8-5:00 M-F)  If a referral was made to a [facility] and you don't get a call from central scheduling please call [phone].  If a Mammogram was ordered for you at [facility] call [phone] to schedule or change your appointment.  If you had an XRay/CT/Ultrasound/MRI ordered the number is [phone] to schedule or change your radiology appointment.   Medical Concerns:  If you have urgent medical concerns please call [phone] at any time of the day.    Liliam Weinberg MD

## 2019-06-04 NOTE — PROGRESS NOTES
>67 year old Wellness Visit ( Medicare etc)         HPI       This 73 year old male presents as an established patient  Liliam Weinberg who presents for a  Annual Wellness Exam.    Other issues patient wants to address today:    Discuss blood sugars.   Has been working hard on his diet and has lost some weight.  Is writing down his BPs and are averaging 115 - 125/70-80.     Concerned that he has nocturia 3 - 4 times at night and this has been going on for awhile. Not able to be clear on when that started but appears to be more noticeable recently. Denies dysuria, difficulty voiding, decreased stream or hesitancy,     Has had basal cell removed from his left check    Visual Acuity:Annual eye exams.    Patient Active Problem List   Diagnosis     Lumbar radiculopathy     Chronic kidney disease, stage II (mild)     Diverticulosis of large intestine     Sebaceous cyst     Esophageal reflux     Essential hypertension     Open angle with borderline findings, low risk     Benign neoplasm of colon     Rosacea     Other seborrheic keratosis     Shoulder joint pain     Dermatofibroma of lower extremity     Hyperlipidemia LDL goal <100     Prediabetes       Past Medical History:   Diagnosis Date     Gastro-oesophageal reflux disease      Hypertension      Quadriceps tendon rupture 3/16/2013        Family History   Problem Relation Age of Onset     Diabetes Mother      Alzheimer Disease Mother      Diabetes Brother      Diabetes Brother      Diabetes Brother      Diabetes Brother      Genitourinary Problems Father         CKD secondary to being quad         Problem List, Family History and past Medical History reviewed and unchanged/updated.       Health risk Assessment/ Review of Systems:         Constitutional:   Fevers or night sweats?  NO      Eyes:   Vision problems?  NO            Hearing:  Do you feel you have hearing loss?  NO  Cardiovascular:  Chest pain, palpitations, or pain with walking?  NO        Respiratory:    Breathing problems or cough?  NO    :   Difficulty controlling urination?  NO but urinary symptoms in HPI       Musculoskeletal:   Stiffness or sore joints?  NO            Skin:   concerning lesions or moles?  NO           Nervous System:   loss of strength or sensation,  numbness or tingling,  tremor,  dizziness,  headache?  NO         Mental Health:   depression or anxiety,  sleep problems?  NO   Cognition:  Memory problems?  NO       Weight Loss: Have you lost 10 or more pounds unintentionally in the previous year?  NO  Energy: How much of the time during the past 3 weeks did you feel tired?   (check one of the following):   ?  All of the time  ? Most of the time  ? Some of the time  ? A little of the time  ? None of the Time    PHQ-2 Score:   PHQ-2 (  [employer]) 2019 10/11/2018   Q1: Little interest or pleasure in doing things 0 0   Q2: Feeling down, depressed or hopeless 0 0   PHQ-2 Score 0 0       PHQ-9 Score:   No flowsheet data found.  Medical Care:     What other specialists or organizations are involved in your medical care?     Patient Care Team       Relationship Specialty Notifications Start End    Liliam Weinberg MD PCP - General Family Practice  11     Phone: [phone] Fax: [phone]          [address] [facility] [address]    Deirdre Dietz MD MD Colon and Rectal Surgery  6/11/15     Jorge Taylor MD MD Orthopedics  12/11/15     Phone: [phone] Fax: [phone]         [address] [facility] [address]    Kasey Baker NP Assigned PCP   19     Phone: [phone] Fax: [phone] [address] [facility] [address]          Do you have an advanced directive? Yes - reviewed       Social History / Home Safety     Social History     Tobacco Use     Smoking status: Former Smoker     Last attempt to quit: 3/18/1980     Years since quittin.2     Smokeless tobacco: Never Used     Tobacco comment: Quit many years ago    Substance Use Topics     Alcohol use: Yes     Comment: occasional      Marital Status:  Who lives in your household? Self/spouse  Does your home have any of the following safety concerns? Loose rugs in the hallway, no grab bars in the bathroom, no handrails on the stairs or have poorly lit areas?  No   Do you feel threatened or controlled by a partner, ex-partner or anyone in your life? {Yes No   Has anyone hurt you physically, for example by pushing, hitting, slapping or kicking you   or forcing you to have sex? No       Functional Status     Do you need help with dressing yourself, bathing, or walking?No   Do you need help with the phone, transportation, shopping, preparing meals, housework, laundry, medications or managing money?No   By yourself and not using aids, do you have any difficulty walking up 10 steps  without resting? No   By yourself and not using aids, do you have any difficulty walking several hundred yards? No              Risk Behaviors and Healthy Habits     History   Smoking Status     Former Smoker     Quit date: 3/18/1980   Smokeless Tobacco     Never Used     Comment: Quit many years ago     How many servings of fruits and vegetables do you eat a day? 2-3  Exercise:walking  and biking 2-3 days/week for an average of 30-45 minutes  Do you frequently drive without a seatbelt? No   History   Smoking Status     Former Smoker     Quit date: 3/18/1980   Smokeless Tobacco     Never Used     Comment: Quit many years ago     Do you use any other drugs? No       Do you use alcohol?Yes  Number of drinks per day 2-3  Number of drinking days a week 2-3      Functional Ability   Was the patient's timed Up & Go test (Get up from chair walk, 10 feet turn, return to chair and sit down) unsteady or longer than 10 seconds?  No    Fall risk:     1. Have you fallen two or more times in the past year? No   2. Have you fallen and had an injury in the past year?  No         Evaulation of Cognitive Function  "    Family member/caregiver input: Normal    1) Repeat 3 items (Leader, Season, Table)    2) Clock draw: NORMAL  3) 3 item recall: Recalls 3 objects  Results: 3 items recalled: COGNITIVE IMPAIRMENT LESS LIKELY    Mini-CogTM Copyright S Anthony. Licensed by the author for use in [employer]; reprinted with permission (odette@[facility]). All rights reserved.            Other Assessments:     CV Risk based on Pooled Cohort Risk (consider assessing every 4-6 years; consider statin in patients with 10-yr risk > 7.5%):   The ASCVD Risk score (Xochitl TITO Jr., et al., 2013) failed to calculate for the following reasons:    The valid total cholesterol range is 130 to 320 mg/dL       Advance Directives: Discussed with patient and family as appropriate.  Has patient completed advance directives? Yes: Advance Directive has been received and scanned.      Immunization History   Administered Date(s) Administered     FLU 6-35 months 11/30/2012     Flu 65+ Years 10/22/2018     Influenza (High Dose) 3 valent vaccine 10/16/2015, 10/14/2016, 10/03/2017     Influenza (IIV3) PF 11/30/2012, 10/28/2013     Pneumo Conj 13-V (2010&after) 12/08/2016     Pneumococcal 23 valent 11/30/2012     Td (Adult), Adsorbed 03/05/2009     Zoster vaccine, live 11/30/2012     Reviewed Immunization Record Today    Physical Exam     Vitals: /75   Pulse 64   Temp 98.5  F (36.9  C) (Oral)   Ht 1.981 m (6' 6\")   Wt 137.8 kg (303 lb 12.8 oz)   SpO2 97%   BMI 35.11 kg/m    BMI= Body mass index is 35.11 kg/m .  GENERAL APPEARANCE: alert and no distress  HENT: ear canals patent and TM's normal; mouth without ulcers or lesions; and oral mucous membranes moist  Hearing loss screen:   Normal   DENTITION:  Good repair?  yes  RESP: lungs clear to auscultation - no rales, rhonchi or wheezes  CV: regular rates and rhythm, no murmur, click or rub, no peripheral edema and peripheral pulses strong  SKIN: no suspicious lesions or rashes  NEURO: Normal strength " and tone  MENTAL STATUS EXAM  Appearance: appropriate  Attitude: cooperative  Behavior: normal  Eye Contact: normal  Speech: normal  Orientation: oreinted to person , place, time and situation  Mood:  normal  Affect: Mood Congruent  Thought Process: clear        Assessment and Plan   Welcome to Medicare Preventive Visit / Initial Medicare Annual Wellness Exam - reviewed Preventive Services and Plan form with patient as specified in Patient Instructions.    Doug was seen today for physical.    Diagnoses and all orders for this visit:    Medicare annual wellness visit, subsequent - up to date with prevention. Unable to determine ASCVD risk but on statin and BP meds.    Essential Hypertension - controlled at home.  Has white coat HTN.    -     Hemoglobin A1c ([facility])  -     Basic Metabolic Panel ([facility])  -     Albumin Random Urine Quantitative with Creat Ratio    Prediabetes - excited to see that his HgA1c is improved.    BCC (basal cell carcinoma), face    Nocturia - will do stepped testing by starting with decreasing the hydrochlorothiazide to see the effect on his nocturia. If no better, ok to stop and keep checking BPs.  More likely that due to some early BPH and so if dropping the hydrochlorothiazide does not improve symptoms, could add Terazosin or other alpha blocker and watch BP.  Will do so through My Chart.         Options for treatment and follow-up care were reviewed with the Doug Nieves and/or guardian engaged in the decision making process and verbalized understanding of the options discussed and agreed with the final plan.    Liliam Weinberg MD

## 2019-10-05 ENCOUNTER — HEALTH MAINTENANCE LETTER (OUTPATIENT)
Age: 74
End: 2019-10-05

## 2019-10-08 DIAGNOSIS — I10 BENIGN ESSENTIAL HYPERTENSION: ICD-10-CM

## 2019-10-08 RX ORDER — ENALAPRIL MALEATE 20 MG/1
40 TABLET ORAL DAILY
Qty: 180 TABLET | Refills: 1 | Status: SHIPPED | OUTPATIENT
Start: 2019-10-08 | End: 2020-04-24

## 2019-10-08 NOTE — TELEPHONE ENCOUNTER

## 2019-12-10 NOTE — PROGRESS NOTES
NEGIN       Doug is a 74 year old  male  who presents for follow up of concern(s) listed below: Blood sugars, blood pressure, and BPH    Consider HgA1c and Metformin... generally his urine microalbumin is better and GFR is stable.     Today Doug is feeling pretty good. He has been eating well and cutting out things like fries and other starchy foods. 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 Feels very good about how things are going. Continues to exercise without symptoms      Has sibling with diabetes. One recently passed away and another who didn't like the side effects of Metformin including GI upset and diarrhea. Currently, Doug would prefer not to use any medication since things are going well.     Continues to have some peripheral neuropathy and has been trying acupuncture. Feels like this is helping.     Has been splitting hydrochlorothiazide in half and this is good. Not urinating as much at night time. Working to stay hydrated. Eliminated beer from diet. Only noticing some increased frequency.  Last note:plan was to cut hydrochlorothiazide in half to see if nocturia improved and it did.  Will not pursue treating BPH.         Patient Active Problem List   Diagnosis     Lumbar radiculopathy     Chronic kidney disease, stage II (mild)     Diverticulosis of large intestine     Sebaceous cyst     Esophageal reflux     Essential hypertension     Open angle with borderline findings, low risk     Benign neoplasm of colon     Rosacea     Other seborrheic keratosis     Shoulder joint pain     Dermatofibroma of lower extremity     Hyperlipidemia LDL goal <100     Prediabetes     BCC (basal cell carcinoma), face       Current Outpatient Medications   Medication Sig Dispense Refill     amLODIPine (NORVASC) 5 MG tablet Take 1 tablet (5 mg) by mouth daily 90 tablet 3     atorvastatin (LIPITOR) 40 MG tablet Take 1 tablet (40 mg) by mouth daily 90 tablet 3      Blood Glucose Monitoring Suppl GUERDA Use as directed       enalapril (VASOTEC) 20 MG tablet Take 2 tablets (40 mg) by mouth daily 180 tablet 1     hydrochlorothiazide (HYDRODIURIL) 25 MG tablet Take 0.5 tablets (12.5 mg) by mouth daily 90 tablet 3     omeprazole (PRILOSEC) 20 MG DR capsule Take 1 capsule (20 mg) by mouth daily 90 capsule 3     order for DME Blood pressure cuff 1 Units 0     triamcinolone (KENALOG) 0.1 % cream Apply sparingly to affected area three times daily for 14 days. 30 g 0     aspirin (ASPIRIN LOW DOSE) 81 MG tablet Take 1 tablet by mouth daily.       guaiFENesin (MUCINEX) 600 MG 12 hr tablet Take 400 mg by mouth 2 times daily        loratadine (CLARITIN) 10 MG tablet Take 1 tablet (10 mg) by mouth daily (Patient not taking: Reported on 12/11/2019) 90 tablet 3        No Known Allergies     Results from the last 24 hours  Results for orders placed or performed in visit on 12/11/19 (from the past 24 hour(s))   Basic Metabolic Panel ([facility])   Result Value Ref Range    Calcium 9.7 8.5 - 10.1 mg/dL    Chloride 98.0 98.0 - 110.0 mmol/L    Carbon Dioxide 28.4 20.0 - 32.0 mmol/L    Creatinine 0.8 0.7 - 1.3 mg/dL    Glucose 129.6 (H) 70.0 - 99.0 mg'dL    Potassium 3.9 3.3 - 4.5 mmol/dL    Sodium 134.6 132.6 - 141.4 mmol/L    GFR Estimate >90 >60.0 mL/min/1.7 m2    GFR Estimate If Black >90 >60.0 mL/min/1.7 m2    Urea Nitrogen 10.8 7.0 - 21.0 mg/dL   Hemoglobin A1c ([facility])   Result Value Ref Range    Hemoglobin A1C 5.7 4.1 - 5.7 %            Review of Systems:     CONSTITUTIONAL: no fatigue, no unexpected change in weight  SKIN: no worrisome rashes, no worrisome moles, no worrisome lesions  EYES: no acute vision problems or changes  ENT: no ear problems, no mouth problems, no throat problems  RESP: no significant cough, no shortness of breath  CV: no chest pain, no palpitations, no new or worsening peripheral edema  GI: no nausea, no vomiting, no constipation, no diarrhea            Physical Exam:  "    Vitals:    12/11/19 1300 12/11/19 1301 12/11/19 1332   BP: (!) 171/80 (!) 147/87 130/83   BP Location:   Left arm   Patient Position:   Sitting   Cuff Size:   Adult Large   Pulse: 72     Temp: 98.4  F (36.9  C)     TempSrc: Oral     SpO2: 98%     Weight: 138.4 kg (305 lb 3.2 oz)     Height: 2 m (6' 6.74\")       Body mass index is 34.61 kg/m .  Vitals were reviewed and were normal  Vital signs normal except increased blood pressure. This was rechecked.  GENERAL: healthy, alert, well nourished, well hydrated, no distress  HENT: ear canals- normal; TMs- normal; Nose- normal; Mouth- no ulcers, no lesions  NECK: no tenderness, no adenopathy, no asymmetry, no masses, no stiffness; thyroid- normal to palpation  RESP: lungs clear to auscultation - no rales, no rhonchi, no wheezes  CV: regular rates and rhythm, normal S1 S2, no S3 or S4 and no murmur, no click or rub -  EXT: no edema    Results for orders placed or performed in visit on 12/11/19   Basic Metabolic Panel ([facility])     Status: Abnormal   Result Value Ref Range    Calcium 9.7 8.5 - 10.1 mg/dL    Chloride 98.0 98.0 - 110.0 mmol/L    Carbon Dioxide 28.4 20.0 - 32.0 mmol/L    Creatinine 0.8 0.7 - 1.3 mg/dL    Glucose 129.6 (H) 70.0 - 99.0 mg'dL    Potassium 3.9 3.3 - 4.5 mmol/dL    Sodium 134.6 132.6 - 141.4 mmol/L    GFR Estimate >90 >60.0 mL/min/1.7 m2    GFR Estimate If Black >90 >60.0 mL/min/1.7 m2    Urea Nitrogen 10.8 7.0 - 21.0 mg/dL   Hemoglobin A1c ([facility])     Status: None   Result Value Ref Range    Hemoglobin A1C 5.7 4.1 - 5.7 %          Assessment and Plan     Doug was seen today for recheck.    Diagnoses and all orders for this visit:    1. Benign essential hypertension - is controlled. All his home BPs are less than 120/80.  Repeat labs.  His albinuria has been stable.   -     hydrochlorothiazide (HYDRODIURIL) 25 MG tablet; Take 0.5 tablets (12.5 mg) by mouth daily  -     Basic Metabolic Panel ([facility])  -     Hemoglobin A1c ([facility])  -    "  amLODIPine (NORVASC) 5 MG tablet; Take 1 tablet (5 mg) by mouth daily    3. Gastroesophageal reflux disease without esophagitis- rarely takes. Doing well with this.   -     omeprazole (PRILOSEC) 20 MG DR capsule; Take 1 capsule (20 mg) by mouth daily    4. Urinary symptoms   Going better with half tab of hydrochlorothiazide. Continue half dose    5. Polyneuropathy   Discussed that while he is working hard and doing a great job of keeping A1c in the prediabetes range, Metformin can also help with peripheral neuropathy. At this time, would not like to start Metformin so we will hold off for now. If his HgA1c gets worse, would start.     Total time: 30 minutes with more than half spent counseling on his medication options and BP mgmt    Preceptor Attestation:  I was present with the medical student who participated in the service and in the documentation of this note. I have verified the history and personally performed the physical exam and medical decision making. I have verified the content of the note, which accurately reflects my assessment of the patient and the plan of care.   Supervising Physician:  Liliam Weinberg MD.         Options for treatment and follow-up care were reviewed with the patient . Doug Nieves  engaged in the decision making process and verbalized understanding of the options discussed and agreed with the final plan.    Marly Ko, MS3  Liliam Weinberg MD

## 2019-12-11 ENCOUNTER — OFFICE VISIT (OUTPATIENT)
Dept: FAMILY MEDICINE | Facility: CLINIC | Age: 74
End: 2019-12-11
Payer: COMMERCIAL

## 2019-12-11 VITALS
HEART RATE: 72 BPM | DIASTOLIC BLOOD PRESSURE: 83 MMHG | HEIGHT: 78 IN | SYSTOLIC BLOOD PRESSURE: 130 MMHG | TEMPERATURE: 98.4 F | OXYGEN SATURATION: 98 % | WEIGHT: 305.2 LBS | BODY MASS INDEX: 35.31 KG/M2

## 2019-12-11 DIAGNOSIS — I10 BENIGN ESSENTIAL HYPERTENSION: ICD-10-CM

## 2019-12-11 DIAGNOSIS — K21.9 GASTROESOPHAGEAL REFLUX DISEASE WITHOUT ESOPHAGITIS: ICD-10-CM

## 2019-12-11 DIAGNOSIS — I10 ESSENTIAL HYPERTENSION: ICD-10-CM

## 2019-12-11 LAB
BUN SERPL-MCNC: 10.8 MG/DL (ref 7–21)
CALCIUM SERPL-MCNC: 9.7 MG/DL (ref 8.5–10.1)
CHLORIDE SERPLBLD-SCNC: 98 MMOL/L (ref 98–110)
CO2 SERPL-SCNC: 28.4 MMOL/L (ref 20–32)
CREAT SERPL-MCNC: 0.8 MG/DL (ref 0.7–1.3)
GFR SERPL CREATININE-BSD FRML MDRD: >90 ML/MIN/1.7 M2
GLUCOSE SERPL-MCNC: 129.6 MG'DL (ref 70–99)
HBA1C MFR BLD: 5.7 % (ref 4.1–5.7)
POTASSIUM SERPL-SCNC: 3.9 MMOL/DL (ref 3.3–4.5)
SODIUM SERPL-SCNC: 134.6 MMOL/L (ref 132.6–141.4)

## 2019-12-11 RX ORDER — HYDROCHLOROTHIAZIDE 25 MG/1
12.5 TABLET ORAL DAILY
Qty: 90 TABLET | Refills: 3 | Status: SHIPPED | OUTPATIENT
Start: 2019-12-11 | End: 2020-12-21

## 2019-12-11 RX ORDER — AMLODIPINE BESYLATE 5 MG/1
5 TABLET ORAL DAILY
Qty: 90 TABLET | Refills: 3 | Status: SHIPPED | OUTPATIENT
Start: 2019-12-11 | End: 2020-06-03

## 2019-12-11 ASSESSMENT — MIFFLIN-ST. JEOR: SCORE: 2279.38

## 2019-12-11 NOTE — PATIENT INSTRUCTIONS
Here is the plan from today's visit    1. Benign essential hypertension - looking good! Will recheck your labs today  - hydrochlorothiazide (HYDRODIURIL) 25 MG tablet; Take 0.5 tablets (12.5 mg) by mouth daily  Dispense: 90 tablet; Refill: 3    2. Essential hypertension  - amLODIPine (NORVASC) 5 MG tablet; Take 1 tablet (5 mg) by mouth daily  Dispense: 90 tablet; Refill: 3    3. Gastroesophageal reflux disease without esophagitis  - omeprazole (PRILOSEC) 20 MG DR capsule; Take 1 capsule (20 mg) by mouth daily  Dispense: 90 capsule; Refill: 3    4. Urinary symptoms - glad that these are better! Keep on the half dose.     5. Polyneuropathy - consider the Metformin.  OK to hold off.     Please call or return to clinic if your symptoms don't go away.    Follow up plan      Thank you for coming to [facility] today.  Lab Testing:  **If you had lab testing today and your results are reassuring or normal they will be mailed to you or sent through [employer] within 7 days.   **If the lab tests need quick action we will call you with the results.  The phone number we will call with results is # [phone] (home) . If this is not the best number please call our clinic and change the number.  Medication Refills:  If you need any refills please call your pharmacy and they will contact us.   If you need to  your refill at a new pharmacy, please contact the new pharmacy directly. The new pharmacy will help you get your medications transferred faster.   Scheduling:  If you have any concerns about today's visit or wish to schedule another appointment please call our office during normal business hours [phone] (8-5:00 M-F)  If a referral was made to a [facility] and you don't get a call from central scheduling please call [phone].  If a Mammogram was ordered for you at [facility] call [phone] to schedule or change your appointment.  If you had an XRay/CT/Ultrasound/MRI ordered  the number is [phone] to schedule or change your radiology appointment.   Medical Concerns:  If you have urgent medical concerns please call [phone] at any time of the day.    Liliam Weinberg MD

## 2020-01-22 NOTE — PATIENT INSTRUCTIONS
01/22/2020  William F Riedel is a 75 y.o., male.      Procedure: COLONOSCOPY/poss emr (N/A Abdomen) - EMR of cecal polyp on cecal side near IC valve.  Dr Graff   5 day hold Plavix/2 day hold XaMariaelena miles NP/Dr Kristian Locke (GI)/[facility] (notes under media tab) - pg   Anesthesia type: General   Diagnosis: Polyp of colon, unspecified part of colon, unspecified type [K63.5]         Pre-operative evaluation for Procedure(s) (LRB):  COLONOSCOPY/poss emr (N/A)    Encounter Diagnoses   Name Primary?    Polyp of ascending colon, unspecified type Yes    Colon polyp        Review of patient's allergies indicates:   Allergen Reactions    Pcn [penicillins]        No current facility-administered medications on file prior to encounter.      No current outpatient medications on file prior to encounter.       Social History     Tobacco Use   Smoking Status Current Some Day Smoker    Types: Cigarettes       Social History     Substance and Sexual Activity   Alcohol Use Not on file       There is no problem list on file for this patient.      Past Surgical History:   Procedure Laterality Date    BACK SURGERY      COLONOSCOPY W/ POLYPECTOMY  11/21/2019    elsewhere    CORONARY STENT PLACEMENT      ESOPHAGOGASTRODUODENOSCOPY  11/21/2019    elsewhere    KNEE SURGERY             No results for input(s): HCT in the last 72 hours.  No results for input(s): PLT in the last 72 hours.  No results for input(s): K in the last 72 hours.  No results for input(s): CREATININE in the last 72 hours.  No results for input(s): GLU in the last 72 hours.  No results for input(s): PT in the last 72 hours.                    Anesthesia Evaluation         Review of Systems  Anesthesia Hx:  No problems with previous Anesthesia   Hematology/Oncology:  Hematology Normal   Oncology Normal   Hematology Comments: On plavix and xarelto for afib,    Here is the plan from today's visit    1. Essential hypertension  Looking good!  - Basic Metabolic Panel ([facility])  - Urinalysis (UA) ([facility])  - Hemoglobin A1c ([facility])  - Albumin Random Urine Quantitative with Creat Ratio  - TSH    2. Idiopathic progressive polyneuropathy  I will get back to you on options for working this up  - [facility] - PT Referral; Future        Please call or return to clinic if your symptoms don't go away.    Follow up plan  6 months    Thank you for coming to [facility] today.  Lab Testing:  **If you had lab testing today and your results are reassuring or normal they will be mailed to you or sent through [employer] within 7 days.   **If the lab tests need quick action we will call you with the results.  The phone number we will call with results is # [phone] (home) . If this is not the best number please call our clinic and change the number.  Medication Refills:  If you need any refills please call your pharmacy and they will contact us.   If you need to  your refill at a new pharmacy, please contact the new pharmacy directly. The new pharmacy will help you get your medications transferred faster.   Scheduling:  If you have any concerns about today's visit or wish to schedule another appointment please call our office during normal business hours [phone] (8-5:00 M-F)  If a referral was made to a [facility] and you don't get a call from central scheduling please call [phone].  If a Mammogram was ordered for you at [facility] call [phone] to schedule or change your appointment.  If you had an XRay/CT/Ultrasound/MRI ordered the number is [phone] to schedule or change your radiology appointment.   Medical Concerns:  If you have urgent medical concerns please call [phone] at any time of the day.  If you have a medical emergency please call [phone].    Oncology Comments: Minor skin cured with excision     Cardiovascular:   Hypertension, well controlled Denies MI. CAD   Dysrhythmias atrial fibrillation  Denies Angina.    Pulmonary:   COPD, severe Inhaler twice a day, 250 yards walksing but 200 yard  DESAI.   Renal/:   Denies Chronic Renal Disease.     Hepatic/GI:   Denies Liver Disease.    Neurological:   Denies TIA. Denies CVA. Denies Seizures.    Endocrine:   Denies Diabetes.        Physical Exam  General:  Well nourished    Airway/Jaw/Neck:  Airway Findings: Mouth Opening: Normal Tongue: Normal  General Airway Assessment: Adult, Average  Mallampati: II  TM Distance: Normal, at least 6 cm  Jaw/Neck Findings:  Neck ROM: Normal ROM            Mental Status:  Mental Status Findings:  Cooperative, Alert and Oriented         Anesthesia Plan  Type of Anesthesia, risks & benefits discussed:  Anesthesia Type:  general  Patient's Preference:   Intra-op Monitoring Plan:   Intra-op Monitoring Plan Comments:   Post Op Pain Control Plan:   Post Op Pain Control Plan Comments: As per surgeon's plan  Induction:   IV  Beta Blocker:  Patient is not currently on a Beta-Blocker (No further documentation required).       Informed Consent: Patient understands risks and agrees with Anesthesia plan.  Questions answered. Anesthesia consent signed with patient.  ASA Score: 2     Day of Surgery Review of History & Physical:    H&P update referred to the surgeon.         Ready For Surgery From Anesthesia Perspective.

## 2020-02-25 DIAGNOSIS — I10 ESSENTIAL HYPERTENSION: ICD-10-CM

## 2020-02-25 RX ORDER — ATORVASTATIN CALCIUM 40 MG/1
40 TABLET, FILM COATED ORAL DAILY
Qty: 90 TABLET | Refills: 3 | Status: SHIPPED | OUTPATIENT
Start: 2020-02-25 | End: 2021-03-17

## 2020-02-25 NOTE — TELEPHONE ENCOUNTER
"Request for medication refill:   Atorvastatin 40mg  Providers if patient needs an appointment and you are willing to give a one month supply please refill for one month and  send a letter/MyChart using \".SMILLIMITEDREFILL\" .smillimited and route chart to \"P SMI \" (Giving one month refill in non controlled medications is strongly recommended before denial)    If refill has been denied, meaning absolutely no refills without visit, please complete the smart phrase \".smirxrefuse\" and route it to the \"P SMI MED REFILLS\"  pool to inform the patient and the pharmacy.    Francesca Juárez, CMA        "

## 2020-04-23 DIAGNOSIS — I10 BENIGN ESSENTIAL HYPERTENSION: ICD-10-CM

## 2020-04-23 NOTE — TELEPHONE ENCOUNTER

## 2020-04-24 RX ORDER — ENALAPRIL MALEATE 20 MG/1
40 TABLET ORAL DAILY
Qty: 180 TABLET | Refills: 1 | Status: SHIPPED | OUTPATIENT
Start: 2020-04-24 | End: 2020-11-10

## 2020-06-03 DIAGNOSIS — I10 ESSENTIAL HYPERTENSION: ICD-10-CM

## 2020-06-03 RX ORDER — AMLODIPINE BESYLATE 5 MG/1
5 TABLET ORAL DAILY
Qty: 90 TABLET | Refills: 3 | Status: SHIPPED | OUTPATIENT
Start: 2020-06-03 | End: 2021-05-26

## 2020-06-03 NOTE — TELEPHONE ENCOUNTER

## 2020-08-28 ENCOUNTER — OFFICE VISIT (OUTPATIENT)
Dept: FAMILY MEDICINE | Facility: CLINIC | Age: 75
End: 2020-08-28
Payer: COMMERCIAL

## 2020-08-28 VITALS
HEART RATE: 68 BPM | RESPIRATION RATE: 16 BRPM | DIASTOLIC BLOOD PRESSURE: 83 MMHG | SYSTOLIC BLOOD PRESSURE: 161 MMHG | WEIGHT: 299.6 LBS | HEIGHT: 78 IN | BODY MASS INDEX: 34.66 KG/M2 | TEMPERATURE: 97.9 F | OXYGEN SATURATION: 97 %

## 2020-08-28 DIAGNOSIS — I10 BENIGN ESSENTIAL HYPERTENSION: ICD-10-CM

## 2020-08-28 DIAGNOSIS — G60.3 IDIOPATHIC PROGRESSIVE POLYNEUROPATHY: ICD-10-CM

## 2020-08-28 DIAGNOSIS — Z00.00 ENCOUNTER FOR MEDICARE ANNUAL WELLNESS EXAM: Primary | ICD-10-CM

## 2020-08-28 DIAGNOSIS — D12.6 BENIGN NEOPLASM OF COLON, UNSPECIFIED PART OF COLON: ICD-10-CM

## 2020-08-28 LAB
ANION GAP SERPL CALCULATED.3IONS-SCNC: 4 MMOL/L (ref 3–14)
BILIRUBIN UR: NEGATIVE MG/DL
BLOOD UR: NEGATIVE MG/DL
BUN SERPL-MCNC: 8 MG/DL (ref 7–30)
CALCIUM SERPL-MCNC: 8.9 MG/DL (ref 8.5–10.1)
CHLORIDE SERPL-SCNC: 107 MMOL/L (ref 94–109)
CHOLEST SERPL-MCNC: 93 MG/DL
CO2 SERPL-SCNC: 27 MMOL/L (ref 20–32)
CREAT SERPL-MCNC: 0.79 MG/DL (ref 0.66–1.25)
CREAT UR-MCNC: 245 MG/DL
GFR SERPL CREATININE-BSD FRML MDRD: 89 ML/MIN/{1.73_M2}
GLUCOSE SERPL-MCNC: 127 MG/DL (ref 70–99)
GLUCOSE URINE: NEGATIVE
HBA1C MFR BLD: 5.8 % (ref 4.1–5.7)
HDLC SERPL-MCNC: 38 MG/DL
KETONES UR QL: NEGATIVE MG/DL
LDLC SERPL CALC-MCNC: 30 MG/DL
LEUKOCYTE ESTERASE UR: NEGATIVE
MICROALBUMIN UR-MCNC: 283 MG/L
MICROALBUMIN/CREAT UR: 115.51 MG/G CR (ref 0–17)
NITRITE UR QL STRIP: NEGATIVE MG/DL
NONHDLC SERPL-MCNC: 55 MG/DL
PH UR STRIP: 7 [PH] (ref 4.5–8)
POTASSIUM SERPL-SCNC: 4 MMOL/L (ref 3.4–5.3)
PROTEIN UR: ABNORMAL MG/DL
SODIUM SERPL-SCNC: 138 MMOL/L (ref 133–144)
SP GR UR STRIP: 1.02 (ref 1–1.03)
TRIGL SERPL-MCNC: 125 MG/DL
UROBILINOGEN UR STRIP-ACNC: 1 E.U./DL

## 2020-08-28 ASSESSMENT — MIFFLIN-ST. JEOR: SCORE: 2246.2

## 2020-08-28 NOTE — PATIENT INSTRUCTIONS
Personalized Prevention Plan  You are due for the preventive services outlined below.  Your care team is available to assist you in scheduling these services.  If you have already completed any of these items, please share that information with your care team to update in your medical record.  Health Maintenance Due   Topic Date Due     Diptheria Tetanus Pertussis (DTAP/TDAP/TD) Vaccine (1 - Tdap) 10/[birth year]/1972     FALL RISK ASSESSMENT  05/24/2019     PHQ-2  01/01/2020     Annual Wellness Visit  06/04/2020     Colorectal Cancer Screening  08/24/2020     Flu Vaccine (1) 09/01/2020       Flu vaccine: Is getting his flu vaccine this afternoon. Is the quadravalent.     Shingles: Got both doses.     Here is the plan from today's visit    1. Encounter for Medicare annual wellness exam  Doing well!   Colonoscopy is the only preventive test that you are due  Flu     2. Benign essential hypertension  - Basic Metabolic Panel ([facility])  - Hemoglobin A1c ([facility])  - Lipid Cascade ([facility])  - Urinalysis, Micro If (UA) ([facility])  - Albumin Random Urine Quantitative with Creat Ratio    3. Benign neoplasm of colon, unspecified part of colon  Ok to do the colonoscopy in October. They should call you.   - GASTROENTEROLOGY ADULT REF PROCEDURE ONLY; Future    Please call or return to clinic if your symptoms don't go away.    Follow up plan  1 year.     Thank you for coming to [facility] today.  Lab Testing:  **If you had lab testing today and your results are reassuring or normal they will be mailed to you or sent through [employer] within 7 days.   **If the lab tests need quick action we will call you with the results.  The phone number we will call with results is # [phone] (home) . If this is not the best number please call our clinic and change the number.  Medication Refills:  If you need any refills please call your pharmacy and they will contact us.   If you need to  your refill at a new pharmacy, please  contact the new pharmacy directly. The new pharmacy will help you get your medications transferred faster.   Scheduling:  If you have any concerns about today's visit or wish to schedule another appointment please call our office during normal business hours [phone] (8-5:00 M-F)  If a referral was made to a [facility] and you don't get a call from central scheduling please call [phone].  If a Mammogram was ordered for you at [facility] call [phone] to schedule or change your appointment.  If you had an XRay/CT/Ultrasound/MRI ordered the number is [phone] to schedule or change your radiology appointment.   Medical Concerns:  If you have urgent medical concerns please call [phone] at any time of the day.    Liliam Weinberg MD

## 2020-08-28 NOTE — PROGRESS NOTES
Medicare Annual Wellness Visit         HPI     This 74 year old male presents as an established patient  Liliam Weinberg who presents for an subsequent Medicare Wellness Exam.    Concerns-possible neuropathy in feet    Has been getting acupuncture for his neuropathic symptoms. Would notice when he took steps would not feel things, and the acupuncture seems to help with that.  The balance therapy helped that as well.  Walking and exercising is going well.      Is working on diet more than he has in the past and has lost some weight.     Chronic PND - saline spray is working pretty well.      H/po basal ca - just been to the dermatologist.     BPs: at home are 126- 120s/ 80s. Consistently.    Wt Readings from Last 4 Encounters:   08/28/20 135.9 kg (299 lb 9.6 oz)   12/11/19 138.4 kg (305 lb 3.2 oz)   06/04/19 137.8 kg (303 lb 12.8 oz)   10/11/18 139.9 kg (308 lb 6.4 oz)         Patient Active Problem List   Diagnosis     Lumbar radiculopathy     Chronic kidney disease, stage II (mild)     Diverticulosis of large intestine     Sebaceous cyst     Esophageal reflux     Essential hypertension     Open angle with borderline findings, low risk     Benign neoplasm of colon     Rosacea     Other seborrheic keratosis     Shoulder joint pain     Dermatofibroma of lower extremity     Hyperlipidemia LDL goal <100     Prediabetes     BCC (basal cell carcinoma), face       Past Medical History:   Diagnosis Date     Gastro-oesophageal reflux disease      Hypertension      Quadriceps tendon rupture 3/16/2013        Family History   Problem Relation Age of Onset     Diabetes Mother      Alzheimer Disease Mother      Diabetes Brother      Diabetes Brother      Diabetes Brother      Diabetes Brother      Genitourinary Problems Father         CKD secondary to being quad         Past Surgical History:   Procedure Laterality Date     BIOPSY OF SKIN LESION       COLONOSCOPY       HERNIA REPAIR       REPAIR TENDON QUADRICEPS  3/20/2013     Procedure: REPAIR TENDON QUADRICEPS;  Left Knee Quadricep Tendon Repair   ;  Surgeon: Jorge Taylor MD;  [facility]       Reviewed no other significant FH    Family History and past Medical History reviewed and it is unchanged/updated.       Review of Systems     Constitutional:   fevers, night sweats or unintentional weight change ?  NO      Eyes:   vision change, diplopia or red eyes?  NO      Ears, Nose, Mouth, Throat:   tinnitus or hearing change,  epistaxis or nasal discharge,  oral lesions, throat pain ?  NO      Neck:   stiffness?  NO           Cardiovascular:   chest pain, palpitations, or pain with walking, orthopnea or PND?  NO   Breasts:  Any bumps or unusual discharge?     NO         Respiratory:   dyspnea, cough, shortness of breath or wheezing?  NO         GI:   nausea, vomiting, diarrhea or constipation,  abdominal pain ?  NO         :   change in urine,  dysuria or hematuria,  sexual dysfunction ?  NO        Musculoskeletal:   joint or muscle pain or swelling?  NO            Skin:   concerning lesions or moles?  NO           Nervous System:   loss of strength or sensation,  numbness or tingling,  tremor,  dizziness,  headache?  NO   Endocrine/Homone:   polyuria or polydipsia,  temperature intolerance?  NO            Blood and Lymphnodes:   concerning bumps,  bleeding problems?  NO            Allergy:   environmental allergies?  NO            Mental Health:   depression or anxiety,  sleep problems?  NO               Medical Care     Have you been to an ER or a hospital in the last year? No  What other specialists or organizations are involved in your medical care? Dr. Salmon- Dermatologist  Current providers sharing in care for this patient include:  Patient Care Team:  Liliam Weinberg MD as PCP - General (Family Practice)  Deirdre Dietz MD as MD (Colon and Rectal Surgery)  Jorge Taylor MD as MD (Orthopedics)  Liliam Weinberg MD as Assigned PCP         Social  History     Social History     Tobacco Use     Smoking status: Former Smoker     Last attempt to quit: 3/18/1980     Years since quittin.4     Smokeless tobacco: Never Used     Tobacco comment: Quit many years ago   Substance Use Topics     Alcohol use: Yes     Comment: occasional      Marital Status:  Who lives in your household? With wife  Does your home have any of the following safety concerns? Loose rugs in the hallway, no grab bars in the bathroom, no handrails on the stairs or have poorly lit areas?  No  Do you feel threatened or controlled by a partner, ex-partner or anyone in your life? No  Has anyone hurt you physically, for example by pushing, hitting, slapping or kicking you   or forcing you to have sex? No  Do you need help with the phone, transportation, shopping, preparing meals, housework, laundry, medications or managing money? No   Have you noticed any hearing difficulties? No      Risk Behaviors and Healthy Habits     How many servings of fruits and vegetables do you eat a day? 2-3  How often do you exercise and what do you do? 60-80 minutes 4-5 times a week  Do you frequently ride without a seatbelt? No  Do you use tobacco?  No  Do you use any other drugs? No         Do you use alcohol?Yes  Number of drinks per day 1-2  Number of drinking days a week 2-3    Today's PHQ-2 Score: 0    Sexual Health     Are you sexually active?  Yes    If yes, with men, women, or both? Female  If yes, do you more than one current partner?No  If yes, are you using condoms?  No  Have you had any sexually transmitted infections? No   Any sexual concerns? No     FUNCTIONAL ABILITY/SAFETY SCREENING     Fall Risk Assessment Today: Fallen 2 or more times in the past year?: No  Any fall with injury in the past year?: No    Hearing evaluation if done: Yes    EVALUATION OF COGNITIVE FUNCTION     Mood/affect:Normal  Appearance:Normal  Family member/caregiver input: Normal  Mini Cog Scoring   3 points   Clock Draw  "Test result:  Normal    SCREENING FOR PREVENTION and EARLY DETECTION     ECG (if done)not performed    Corrected Visual acuity: patient see's eye doctor yearly    CV Risk based on Pooled Cohort Risk:  The ASCVD Risk score (Rockwoodady FRANCIS Jr., et al., 2013) failed to calculate for the following reasons:    The valid total cholesterol range is 130 to 320 mg/dL      Advanced Directives: Discussed and patient desires to be full code.      Immunization History   Administered Date(s) Administered     FLU 6-35 months 11/30/2012     Flu 65+ Years 10/22/2018     Influenza (High Dose) 3 valent vaccine 10/16/2015, 10/14/2016, 10/03/2017, 10/22/2018     Influenza (IIV3) PF 11/30/2012, 10/28/2013     Influenza Quad, Recombinant, p-free (RIV4) 10/08/2019     Pneumo Conj 13-V (2010&after) 12/08/2016     Pneumococcal 23 valent 11/30/2012     Td (Adult), Adsorbed 03/05/2009     Zoster vaccine recombinant adjuvanted (SHINGRIX) 02/08/2019, 05/17/2019     Zoster vaccine, live 11/30/2012       Reviewed Immunization Record Today  Pneumoccocal Vaccine: No  Varicella Vaccine: s/p Shingrix  TDaP:No         Physical Exam     Vitals: BP (!) 161/83 (BP Location: Left arm, Patient Position: Sitting, Cuff Size: Adult Large)   Pulse 68   Temp 97.9  F (36.6  C) (Oral)   Resp 16   Ht 1.988 m (6' 6.25\")   Wt 135.9 kg (299 lb 9.6 oz)   SpO2 97%   BMI 34.40 kg/m    BMI= Body mass index is 34.4 kg/m .  GENERAL APPEARANCE: healthy, alert and no distress  EYES: Eyes grossly normal to inspection, PERRL and conjunctivae and sclerae normal  HENT: ear canals and TM's normal, nose and mouth without ulcers or lesions, oropharynx clear and oral mucous membranes moist  NECK: no adenopathy, no asymmetry, masses, or scars and thyroid normal to palpation  RESP: lungs clear to auscultation - no rales, rhonchi or wheezes  CV: regular rates and rhythm, normal S1 S2, no S3 or S4, no murmur, click or rub, no peripheral edema and peripheral pulses strong  MS: no " musculoskeletal defects are noted and gait is age appropriate without ataxia  NEURO: Normal strength and tone, sensory exam grossly normal, mentation intact and speech normal  PSYCH: mentation appears normal and affect normal/bright        Assessment and Plan   subsequent   Medicare Wellness Exam      Doug was seen today for medicare visit.    Diagnoses and all orders for this visit:    Encounter for Medicare annual wellness exam- doing well. Needs colonoscopy  -     Lipid panel reflex to direct LDL Non-fasting  -     Basic metabolic panel    Benign essential hypertension - is not at goal in the clinic but is never in goal here with nl Amb BP done at home confirming white coat component.  Has had persistent mild albinuria - so will continue to monitor for progression.   -     Basic Metabolic Panel ([facility])  -     Hemoglobin A1c ([facility])  -     Lipid Cascade ([facility])  -     Urinalysis, Micro If (UA) ([facility])  -     Albumin Random Urine Quantitative with Creat Ratio    Benign neoplasm of colon, unspecified part of colon - known polyps. Due 7/2020 and so strongly encouraged to get this in the next few months. He has a lot of dental work coming so plans to do this in October.   -     GASTROENTEROLOGY ADULT REF PROCEDURE ONLY; Future    Peripheral Neuropathy: - seems better on accupuncture. Offered to do some lumbar scanning to make sure not neurogenic but he is OK watching at this point. No exposure to heavy metals. Continue to monitor HgA1c.     Persistent PND - mild. Offered trial of Atrovent nasal spray and chose to keep watching and use saline nasal spray.       Options for treatment and follow-up care were reviewed with the AlexsanderSACHIN Nieves and/or guardian engaged in the decision making process and verbalized understanding of the options discussed and agreed with the final plan.    CAM MANZANO

## 2020-09-01 LAB
ALBUMIN MFR UR ELPH: 70.9 %
ALPHA1 GLOB MFR UR ELPH: 4.4 %
ALPHA2 GLOB MFR UR ELPH: 2.2 %
B-GLOBULIN MFR UR ELPH: 10.1 %
GAMMA GLOB MFR UR ELPH: 12.4 %
M PROTEIN MFR UR ELPH: 0 %
PROT PATTERN UR ELPH-IMP: ABNORMAL

## 2020-09-02 DIAGNOSIS — R73.03 PREDIABETES: Primary | ICD-10-CM

## 2020-09-30 ENCOUNTER — VIRTUAL VISIT (OUTPATIENT)
Dept: NUTRITION | Facility: CLINIC | Age: 75
End: 2020-09-30
Attending: FAMILY MEDICINE
Payer: COMMERCIAL

## 2020-09-30 DIAGNOSIS — R73.03 PREDIABETES: Primary | ICD-10-CM

## 2020-09-30 PROCEDURE — 97802 MEDICAL NUTRITION INDIV IN: CPT | Mod: 95

## 2020-09-30 NOTE — PROGRESS NOTES
Medical Nutrition Therapy for Diabetes  Visit Type:Initial assessment and intervention    Doug Nieves presents today for MNT and education related to prediabetes.   He is accompanied by self.     ASSESSMENT:   Patient comments/concerns relating to nutrition: Healthy diet for blood sugars, and lower inflammation of Rhinitis     PT is 6'8'', 290 lbs, lost 8 lbs recently after changing his diet. He is very active but has not been able to go to the gym due to COVID 19. He has been walking 1 mile everyday. He is concerned with his BG and currently is not monitoring BG- which I agree not necessary at this time.  We discussed Myplate method for balancing meals. Suggested lean protein, whole grains/fruit and lots of non starchy vegetables.   We also dicussed foods high in antioxidants to help with inflammation. Exercise 30 mins a day x 5 days a week was encouraged.     NUTRITION HISTORY:  B:  Granola cereal small bowl + 1 % milk 5-6 oz of milk + coffee x 3 cups with half/half no sugar OR 3 scrambled eggs cheese pepper broccoli onions 2 eng muffin toast coffee x3 OR mixed fruit ( berries) + coffee    L: turkey roast sandwich kettle chips turkey breast sandwich toasted master muffin bread and cheese OR meatloaf slice bread bass OR club sandwich 1/2     D:  Meatloaf, mashed potato, brocc, coleslaw OR cheeseburger chips bass [place] and etienne x 2 light beers Or 1/2 club sandwich  S: celery sticks and carrots or 6 oz ice cream x3 days vanilla cheesecake     Izabel: water    Misses meals? no  Eats out:  seldom     Previous diet education:  No     Food allergies/intolerances: none     Diet is high in: protein  Diet is low in: carb's    EXERCISE: walks 1 mile every day     SOCIO/ECONOMIC:   Lives with: spouse    BLOOD GLUCOSE MONITORING:  Patient glucose self monitoring as follows: never.     Patient's most recent   Lab Results   Component Value Date    A1C 5.8 08/28/2020    is meeting goal of <7.0    MEDICATIONS:  Current Outpatient  Medications   Medication     amLODIPine (NORVASC) 5 MG tablet     aspirin (ASPIRIN LOW DOSE) 81 MG tablet     atorvastatin (LIPITOR) 40 MG tablet     Blood Glucose Monitoring Suppl GUERDA     enalapril (VASOTEC) 20 MG tablet     guaiFENesin (MUCINEX) 600 MG 12 hr tablet     hydrochlorothiazide (HYDRODIURIL) 25 MG tablet     omeprazole (PRILOSEC) 20 MG DR capsule     order for DME     triamcinolone (KENALOG) 0.1 % cream     No current facility-administered medications for this visit.        LABS:  Lab Results   Component Value Date    A1C 5.8 08/28/2020     Lab Results   Component Value Date     08/28/2020     Lab Results   Component Value Date    LDL 30 08/28/2020     HDL Cholesterol   Date Value Ref Range Status   08/28/2020 38 (L) >39 mg/dL Final   ]  GFR Estimate   Date Value Ref Range Status   08/28/2020 89 >60 mL/min/[1.73_m2] Final     Comment:     Non  GFR Calc  Starting 12/18/2018, serum creatinine based estimated GFR (eGFR) will be   calculated using the Chronic Kidney Disease Epidemiology Collaboration   (CKD-EPI) equation.       GFR Estimate If Black   Date Value Ref Range Status   08/28/2020 >90 >60 mL/min/[1.73_m2] Final     Comment:      GFR Calc  Starting 12/18/2018, serum creatinine based estimated GFR (eGFR) will be   calculated using the Chronic Kidney Disease Epidemiology Collaboration   (CKD-EPI) equation.       Lab Results   Component Value Date    CR 0.79 08/28/2020     No results found for: MICROALBUMIN    ANTHROPOMETRICS:  Vitals: There were no vitals taken for this visit.  There is no height or weight on file to calculate BMI.      Wt Readings from Last 5 Encounters:   08/28/20 135.9 kg (299 lb 9.6 oz)   12/11/19 138.4 kg (305 lb 3.2 oz)   06/04/19 137.8 kg (303 lb 12.8 oz)   10/11/18 139.9 kg (308 lb 6.4 oz)   06/21/18 141.6 kg (312 lb 3.2 oz)       Weight Change: loss of 8 lbs     ESTIMATED KCAL REQUIREMENTS:  2555 kcal per day    NUTRITION DIAGNOSIS:  Altered GI function related to abnormal glucose absorption as evidenced by elevated A1C    NUTRITION INTERVENTION:  Education given to support: carb counting  Education Materials Provided: My Plate Planner/Choose My Plate    PATIENT'S BEHAVIOR CHANGE GOALS:   See Patient Instructions for patient stated behavior change goals. AVS was printed and given to patient at today's appointment.    MONITOR / EVALUATE:  RD will monitor/evaluate:  Beliefs and attitudes related to food  Blood Glucose / A1c    FOLLOW-UP:  Follow up with RD as needed.    Mily Hollis MS, RD, LD, CDE       Time spent in minutes: 60  Encounter: Individual

## 2020-09-30 NOTE — PATIENT INSTRUCTIONS
Carbohydrate Counting    Choose 45gm CHO per meal, and 15-30gm per snack. Pair a carbohydrate with a protein or a fat.  Often used as a meal planning tool for people with diabetes, it can also help with weight loss and diabetes prevention. Carbohydrates that are high in fiber are best - fruits, vegetables, legumes and whole grains, Keep in mind that portion control for proteins and fats is important to avoid overeating. http://www.fvfiles.com/810062.pdf       My Plate  Plate planning provides an easy way to create balanced meals based on foods you like to eat. Fill one quarter of your plate with non-starchy vegetables, one quarter with fruit, one quarter with lean protein, and the last quarter with fiber-rich starch or grain. Add a serving of calcium-rich dairy or non-dairy substitute to complete the balanced meal. You may add a small amount of fat to your meal, if desired (such as 1 tablespoon salad dressing or 1 teaspoon butter or margarine). If you choose to snack, keep snacks small. https://www.choosemyplate.gov/       Snack Ideas for your Meal Plan     Protein (1 serving = 6-8 grams of protein each)    Beef Jerky ( 2 pieces)    Beef Sticks, plain (1 stick)    Cheese/Cheese Stick (1 oz)    Chicken breast (1 oz)    Cottage cheese, low fat (1/4 cup)    Crab, Imitation (2 oz)    Deli Meat (2 oz)    Edamame, boiled (1/2 cup)    Egg, hardboiled (1)    Shrimp, small (10 pieces)     Turkey Breast (1 oz)    Tuna, canned in water (1 oz)    Fairlife Milk (1/2 cup)    Yogurt, Greek : Siggis, Oikos Triple Zero, Dannon Light and Fit, etc (6 oz)    Carb (1 carb choice/serving = 15 grams)     Apple (medium)    Applesauce, unsweetened (1/2 cup)    Apricots, dried (4 whole)    Banana, medium (1/2)    Bread, 1 slice     Cantaloupe/Melon (1 cup)    Crackers 4-6 (varies by brand)    Cherries (15)    Cranberries, Dried (2 tbsp)    Dark Chocolate (1 oz)    Dates, dried (2-3 pieces)    Fruit cocktail, in own juice (1/2 cup)    Granola  Bar (vary by brand)    Grapes (1/2 cup)    Ice cream, slow churned/low fat (1/2 cup)    Kiwi (~2)    Mixed Berries (1 cup)    Orange (1 medium)    Peach (1 medium)    Pear (1/2 medium)    Plums (2)    Pomegranate (1 small)    Popcorn, stove popped (2 cups)    Pretzels (3/4 oz)    Pudding, sugar free (1/2 cup)    Raisins (2 Tbsp)    Rice Cake, (2)    Skim or 1% milk (1 cup)    Tortilla Chips (1 oz)    Yogurt (greek or plain)   cup    Fat (1 serving = 100 calories)    Almonds (2 Tbsp)    Avocado (1/3 fruit OR 3 Tbsp)    Cashews (11 whole)    Cheese (1 oz)    Chocolate, dark (3/4 oz)    Coconut, unsweetened (1/3 c shredded)    Hummus (3 Tbsp)    Peanuts (20 pieces)    Peanut/Nut Butter (1 Tbsp)    Pecans (10 halves)    Pumpkin seeds, unshelled (2 Tbsp)    Salad dressing  (1-2 Tbsp)    Sunflower seeds (2 Tbsp)    Vegetable Dip (1-2 Tbsp)    Walnuts (8 halves)    Free Foods    Bell Peppers    Broccoli    Carrots     Cauliflower     Celery    Coffee, black (regular or decaf)    Cucumber    Gelatin, Sugar Free    Herbal Tea, unsweetened     Pea Pods    Pickles (high in sodium)     Popsicle, Sugar Free    Salsa (2 Tbsp)    Tomatoes    Combination Snacks    Apple + 1 Tbsp peanut butter (carbohydrate + fat)    Handful crackers + 1 oz cheese (carbohydrate + fat or protein)    Carrot sticks + Hummus (free food + fat)    1 piece of peanut butter toast (carbohydrate + fat)    Greek yogurt + 2 Tbsp sunflower seeds (carbohydrate + fat)    Hard-boiled egg +   cup grapes (protein + carbohydrate)    1 piece of avocado toast (carbohydrate + fat)    Cucumbers + dip (free food + fat)    Mily Hollis, MS, RD, LD, CDE

## 2020-11-10 DIAGNOSIS — I10 BENIGN ESSENTIAL HYPERTENSION: ICD-10-CM

## 2020-11-10 RX ORDER — ENALAPRIL MALEATE 20 MG/1
40 TABLET ORAL DAILY
Qty: 180 TABLET | Refills: 1 | Status: SHIPPED | OUTPATIENT
Start: 2020-11-10 | End: 2021-05-03

## 2020-11-10 NOTE — TELEPHONE ENCOUNTER
"Request for medication refill:  enalapril (VASOTEC) 20 MG tablet    Providers if patient needs an appointment and you are willing to give a one month supply please refill for one month and  send a letter/MyChart using \".SMILLIMITEDREFILL\" .smillimited and route chart to \"P [facility] \" (Giving one month refill in non controlled medications is strongly recommended before denial)    If refill has been denied, meaning absolutely no refills without visit, please complete the smart phrase \".smirxrefuse\" and route it to the \"P [facility] MED REFILLS\"  pool to inform the patient and the pharmacy.    Dorothy Walton MA          "

## 2020-11-12 DIAGNOSIS — Z11.59 ENCOUNTER FOR SCREENING FOR OTHER VIRAL DISEASES: Primary | ICD-10-CM

## 2020-12-21 DIAGNOSIS — I10 BENIGN ESSENTIAL HYPERTENSION: ICD-10-CM

## 2020-12-21 RX ORDER — HYDROCHLOROTHIAZIDE 25 MG/1
12.5 TABLET ORAL DAILY
Qty: 90 TABLET | Refills: 3 | Status: SHIPPED | OUTPATIENT
Start: 2020-12-21 | End: 2021-11-08 | Stop reason: ALTCHOICE

## 2020-12-21 NOTE — TELEPHONE ENCOUNTER

## 2021-01-04 DIAGNOSIS — Z11.59 ENCOUNTER FOR SCREENING FOR OTHER VIRAL DISEASES: ICD-10-CM

## 2021-01-04 LAB
SARS-COV-2 RNA SPEC QL NAA+PROBE: NORMAL
SPECIMEN SOURCE: NORMAL

## 2021-01-04 PROCEDURE — U0005 INFEC AGEN DETEC AMPLI PROBE: HCPCS | Mod: 90 | Performed by: PATHOLOGY

## 2021-01-04 PROCEDURE — U0003 INFECTIOUS AGENT DETECTION BY NUCLEIC ACID (DNA OR RNA); SEVERE ACUTE RESPIRATORY SYNDROME CORONAVIRUS 2 (SARS-COV-2) (CORONAVIRUS DISEASE [COVID-19]), AMPLIFIED PROBE TECHNIQUE, MAKING USE OF HIGH THROUGHPUT TECHNOLOGIES AS DESCRIBED BY CMS-2020-01-R: HCPCS | Mod: 90 | Performed by: PATHOLOGY

## 2021-01-05 LAB
LABORATORY COMMENT REPORT: NORMAL
SARS-COV-2 RNA SPEC QL NAA+PROBE: NEGATIVE
SPECIMEN SOURCE: NORMAL

## 2021-01-08 ENCOUNTER — HOSPITAL ENCOUNTER (OUTPATIENT)
Facility: AMBULATORY SURGERY CENTER | Age: 76
Discharge: HOME | End: 2021-01-08
Attending: INTERNAL MEDICINE | Admitting: INTERNAL MEDICINE
Payer: COMMERCIAL

## 2021-01-08 VITALS
WEIGHT: 295 LBS | HEART RATE: 56 BPM | SYSTOLIC BLOOD PRESSURE: 136 MMHG | TEMPERATURE: 97.5 F | BODY MASS INDEX: 34.13 KG/M2 | HEIGHT: 78 IN | OXYGEN SATURATION: 97 % | DIASTOLIC BLOOD PRESSURE: 77 MMHG | RESPIRATION RATE: 16 BRPM

## 2021-01-08 LAB — COLONOSCOPY: NORMAL

## 2021-01-08 PROCEDURE — 45380 COLONOSCOPY AND BIOPSY: CPT | Mod: PT,59

## 2021-01-08 PROCEDURE — 45385 COLONOSCOPY W/LESION REMOVAL: CPT | Mod: PT

## 2021-01-08 PROCEDURE — 88305 TISSUE EXAM BY PATHOLOGIST: CPT | Mod: GC | Performed by: PATHOLOGY

## 2021-01-08 RX ORDER — SIMETHICONE
LIQUID (ML) MISCELLANEOUS PRN
Status: DISCONTINUED | OUTPATIENT
Start: 2021-01-08 | End: 2021-01-08 | Stop reason: HOSPADM

## 2021-01-08 RX ORDER — ONDANSETRON 4 MG/1
4 TABLET, ORALLY DISINTEGRATING ORAL EVERY 6 HOURS PRN
Status: DISCONTINUED | OUTPATIENT
Start: 2021-01-08 | End: 2021-01-09 | Stop reason: HOSPADM

## 2021-01-08 RX ORDER — ONDANSETRON 2 MG/ML
4 INJECTION INTRAMUSCULAR; INTRAVENOUS EVERY 6 HOURS PRN
Status: DISCONTINUED | OUTPATIENT
Start: 2021-01-08 | End: 2021-01-09 | Stop reason: HOSPADM

## 2021-01-08 RX ORDER — FLUMAZENIL 0.1 MG/ML
0.2 INJECTION, SOLUTION INTRAVENOUS
Status: ACTIVE | OUTPATIENT
Start: 2021-01-08 | End: 2021-01-08

## 2021-01-08 RX ORDER — NALOXONE HYDROCHLORIDE 0.4 MG/ML
0.2 INJECTION, SOLUTION INTRAMUSCULAR; INTRAVENOUS; SUBCUTANEOUS
Status: DISCONTINUED | OUTPATIENT
Start: 2021-01-08 | End: 2021-01-09 | Stop reason: HOSPADM

## 2021-01-08 RX ORDER — LIDOCAINE 40 MG/G
CREAM TOPICAL
Status: DISCONTINUED | OUTPATIENT
Start: 2021-01-08 | End: 2021-01-08 | Stop reason: HOSPADM

## 2021-01-08 RX ORDER — PROCHLORPERAZINE MALEATE 5 MG
5 TABLET ORAL EVERY 6 HOURS PRN
Status: DISCONTINUED | OUTPATIENT
Start: 2021-01-08 | End: 2021-01-09 | Stop reason: HOSPADM

## 2021-01-08 RX ORDER — NALOXONE HYDROCHLORIDE 0.4 MG/ML
0.4 INJECTION, SOLUTION INTRAMUSCULAR; INTRAVENOUS; SUBCUTANEOUS
Status: DISCONTINUED | OUTPATIENT
Start: 2021-01-08 | End: 2021-01-09 | Stop reason: HOSPADM

## 2021-01-08 RX ORDER — ONDANSETRON 2 MG/ML
4 INJECTION INTRAMUSCULAR; INTRAVENOUS
Status: DISCONTINUED | OUTPATIENT
Start: 2021-01-08 | End: 2021-01-08 | Stop reason: HOSPADM

## 2021-01-08 RX ORDER — FENTANYL CITRATE 50 UG/ML
INJECTION, SOLUTION INTRAMUSCULAR; INTRAVENOUS PRN
Status: DISCONTINUED | OUTPATIENT
Start: 2021-01-08 | End: 2021-01-08 | Stop reason: HOSPADM

## 2021-01-08 ASSESSMENT — MIFFLIN-ST. JEOR: SCORE: 2248.11

## 2021-01-12 LAB — COPATH REPORT: NORMAL

## 2021-01-28 DIAGNOSIS — K21.9 GASTROESOPHAGEAL REFLUX DISEASE WITHOUT ESOPHAGITIS: ICD-10-CM

## 2021-01-28 NOTE — TELEPHONE ENCOUNTER
"Request for medication refill:  omeprazole (PRILOSEC) 20 MG DR capsule    Providers if patient needs an appointment and you are willing to give a one month supply please refill for one month and  send a letter/MyChart using \".SMILLIMITEDREFILL\" .smillimited and route chart to \"P [facility] \" (Giving one month refill in non controlled medications is strongly recommended before denial)    If refill has been denied, meaning absolutely no refills without visit, please complete the smart phrase \".smirxrefuse\" and route it to the \"P [facility] MED REFILLS\"  pool to inform the patient and the pharmacy.    Dorothy Walton MA        "

## 2021-03-02 ENCOUNTER — IMMUNIZATION (OUTPATIENT)
Dept: FAMILY MEDICINE | Facility: CLINIC | Age: 76
End: 2021-03-02
Payer: COMMERCIAL

## 2021-03-02 PROCEDURE — 91300 PR COVID VAC PFIZER DIL RECON 30 MCG/0.3 ML IM: CPT

## 2021-03-02 PROCEDURE — 0001A PR COVID VAC PFIZER DIL RECON 30 MCG/0.3 ML IM: CPT

## 2021-03-17 DIAGNOSIS — I10 ESSENTIAL HYPERTENSION: ICD-10-CM

## 2021-03-17 RX ORDER — ATORVASTATIN CALCIUM 40 MG/1
40 TABLET, FILM COATED ORAL DAILY
Qty: 90 TABLET | Refills: 3 | Status: SHIPPED | OUTPATIENT
Start: 2021-03-17 | End: 2022-04-19

## 2021-03-17 NOTE — TELEPHONE ENCOUNTER

## 2021-03-23 ENCOUNTER — IMMUNIZATION (OUTPATIENT)
Dept: FAMILY MEDICINE | Facility: CLINIC | Age: 76
End: 2021-03-23
Attending: FAMILY MEDICINE
Payer: COMMERCIAL

## 2021-03-23 PROCEDURE — 91300 PR COVID VAC PFIZER DIL RECON 30 MCG/0.3 ML IM: CPT

## 2021-03-23 PROCEDURE — 0002A PR COVID VAC PFIZER DIL RECON 30 MCG/0.3 ML IM: CPT

## 2021-05-03 ENCOUNTER — OFFICE VISIT (OUTPATIENT)
Dept: FAMILY MEDICINE | Facility: CLINIC | Age: 76
End: 2021-05-03
Payer: COMMERCIAL

## 2021-05-03 VITALS
TEMPERATURE: 98.3 F | BODY MASS INDEX: 33.64 KG/M2 | HEIGHT: 78 IN | OXYGEN SATURATION: 96 % | SYSTOLIC BLOOD PRESSURE: 144 MMHG | DIASTOLIC BLOOD PRESSURE: 89 MMHG | HEART RATE: 77 BPM | WEIGHT: 290.8 LBS

## 2021-05-03 DIAGNOSIS — I10 ESSENTIAL HYPERTENSION: ICD-10-CM

## 2021-05-03 DIAGNOSIS — J31.0 CHRONIC RHINITIS: Primary | ICD-10-CM

## 2021-05-03 DIAGNOSIS — R80.9 ALBUMINURIA: ICD-10-CM

## 2021-05-03 DIAGNOSIS — G60.3 IDIOPATHIC PROGRESSIVE POLYNEUROPATHY: ICD-10-CM

## 2021-05-03 DIAGNOSIS — Z00.00 HEALTH CARE MAINTENANCE: ICD-10-CM

## 2021-05-03 DIAGNOSIS — G62.9 PERIPHERAL POLYNEUROPATHY: ICD-10-CM

## 2021-05-03 LAB
ANION GAP SERPL CALCULATED.3IONS-SCNC: 7 MMOL/L (ref 3–14)
BUN SERPL-MCNC: 10 MG/DL (ref 7–30)
CALCIUM SERPL-MCNC: 9.2 MG/DL (ref 8.5–10.1)
CHLORIDE SERPL-SCNC: 102 MMOL/L (ref 94–109)
CO2 SERPL-SCNC: 27 MMOL/L (ref 20–32)
CREAT SERPL-MCNC: 0.85 MG/DL (ref 0.66–1.25)
CREAT UR-MCNC: 181 MG/DL
GFR SERPL CREATININE-BSD FRML MDRD: 86 ML/MIN/{1.73_M2}
GLUCOSE SERPL-MCNC: 106 MG/DL (ref 70–99)
MICROALBUMIN UR-MCNC: 811 MG/L
MICROALBUMIN/CREAT UR: 448.07 MG/G CR (ref 0–17)
POTASSIUM SERPL-SCNC: 4 MMOL/L (ref 3.4–5.3)
SODIUM SERPL-SCNC: 136 MMOL/L (ref 133–144)

## 2021-05-03 PROCEDURE — 36415 COLL VENOUS BLD VENIPUNCTURE: CPT | Performed by: FAMILY MEDICINE

## 2021-05-03 PROCEDURE — 99214 OFFICE O/P EST MOD 30 MIN: CPT | Mod: 25 | Performed by: FAMILY MEDICINE

## 2021-05-03 PROCEDURE — 90471 IMMUNIZATION ADMIN: CPT | Performed by: FAMILY MEDICINE

## 2021-05-03 PROCEDURE — 80048 BASIC METABOLIC PNL TOTAL CA: CPT | Performed by: FAMILY MEDICINE

## 2021-05-03 PROCEDURE — 90715 TDAP VACCINE 7 YRS/> IM: CPT | Performed by: FAMILY MEDICINE

## 2021-05-03 PROCEDURE — 82043 UR ALBUMIN QUANTITATIVE: CPT | Performed by: FAMILY MEDICINE

## 2021-05-03 RX ORDER — LOSARTAN POTASSIUM 50 MG/1
50 TABLET ORAL AT BEDTIME
Qty: 90 TABLET | Refills: 1 | Status: SHIPPED | OUTPATIENT
Start: 2021-05-03 | End: 2021-10-29

## 2021-05-03 RX ORDER — IPRATROPIUM BROMIDE 21 UG/1
2 SPRAY, METERED NASAL EVERY 12 HOURS
Qty: 30 ML | Refills: 3 | Status: SHIPPED | OUTPATIENT
Start: 2021-05-03 | End: 2021-12-03

## 2021-05-03 ASSESSMENT — MIFFLIN-ST. JEOR: SCORE: 2213.19

## 2021-05-03 NOTE — PATIENT INSTRUCTIONS
Patient Education   Here is the plan from today's visit    1. Chronic rhinitis  Try this medication for then next few weeks before giving up  Keep working on regular saline rinse - twice a day or even three times a day  - ipratropium (ATROVENT) 0.03 % nasal spray; Spray 2 sprays into both nostrils every 12 hours  Dispense: 30 mL; Refill: 3    2. Albuminuria  We will have you check your urine. And blood.   Stop the Lisinopril and start one pill of Cozaar  Check your blood pressures. If they are not always below 130/80, then take TWO Cozaar and tell me.  My guess is you are going to need two.   - Albumin Random Urine Quantitative with Creat Ratio  - Protein  random urine with Creat Ratio  - losartan (COZAAR) 50 MG tablet; Take 1 tablet (50 mg) by mouth At Bedtime  Dispense: 90 tablet; Refill: 1    3. Blood pressure  I'd like to see you back to see how the new medication is working and see if we need to increase it.     4. Neuropathy  I reviewed your chart and we have not done the full testing - some but not all. I added labs to the blood work we rayna today.  I'd like to see you back and we can do repeat nerve exam, review your labs and see if you would benefit from looking at your back or possibly doing another test called an EMG.  I am glad that it is staying steady, which is a good sign that there is nothing serious going on but I also think we should complete the work up too.         Please call or return to clinic if your symptoms don't go away.    Follow up plan  No follow-ups on file.   Return in about 2 weeks (around 5/17/2021).    Thank you for coming to [facility] today.  Lab Testing:  **If you had lab testing today and your results are reassuring or normal they will be mailed to you or sent through [facility] within 7 days.   **If the lab tests need quick action we will call you with the results.  **If you are having labs done on a different day, please call [phone] to schedule at [facility] or  [phone] for other [facility] locations.   The phone number we will call with results is # [phone] (home) . If this is not the best number please call our clinic and change the number.  Medication Refills:  If you need any refills please call your pharmacy and they will contact us.   If you need to  your refill at a new pharmacy, please contact the new pharmacy directly. The new pharmacy will help you get your medications transferred faster.   Scheduling:  If you have any concerns about today's visit or wish to schedule another appointment please call our office during normal business hours [phone] (8-5:00 M-F)  If a referral was made to a [facility] and you don't get a call from central scheduling please call [phone].  If a Mammogram was ordered for you at [facility] call [phone] to schedule or change your appointment.  If you had an EKG/XRay/CT/Ultrasound/MRI ordered the number is [phone] to schedule or change your radiology appointment.   Medical Concerns:  If you have urgent medical concerns please call [phone] at any time of the day.    Liliam Weinberg MD

## 2021-05-03 NOTE — PROGRESS NOTES
"    Assessment & Plan     Chronic rhinitis  Has yet to try Atrovent - so will see if that can help.  Seems more related to chronic rhinitis - vasomotor.   Change from Lisinopril to Cozaar since there might be underlying cough component as well.    - ipratropium (ATROVENT) 0.03 % nasal spray; Spray 2 sprays into both nostrils every 12 hours    Albuminuria  Will repeat this. Home BPs are always good. Switch to ARB and started on 50 mg but will likely need to be on 100 mg.    - Albumin Random Urine Quantitative with Creat Ratio  - losartan (COZAAR) 50 MG tablet; Take 1 tablet (50 mg) by mouth At Bedtime    Essential hypertension  - Basic metabolic panel    Health care maintenance  - TDAP VACCINE (Adacel, Boostrix)  [[id number]]    Neuropathy - no changes.  We have not done a full workup (reviewed this post visit since ran out of time) and will add the following labs: SPEP (UPEP done), ANGELI, TSH, B12.  MyChart message regarding this.              BMI:   Estimated body mass index is 32.76 kg/m  as calculated from the following:    Height as of this encounter: 2.007 m (6' 7\").    Weight as of this encounter: 131.9 kg (290 lb 12.8 oz).           No follow-ups on file.    Liliam Weinberg MD  [facility] KELI Camacho is a 75 year old who presents for the following health issues     HPI     GERD:   Controlled with PPI and Tums prn.   corelation between acid reflux and phenylephrine  Stopped Phenylephrine and congestion is no different.   Finds that he gets dry throat and then has congestion and it gets worse.    Drinks water and then hast   Is doing saline nasal rinses.     Neuropathy - does not think he is getting worse, and is holding his own and is doing acupuncture seems to help.  Asking if there is more to do.     HTN:  Was nervous when he walked in today. Home BPs  117/79 - 75  138/89 - 79  118/83-75    Wt Readings from Last 4 Encounters:   05/03/21 131.9 kg (290 lb 12.8 oz)   01/08/21 133.8 kg " "(295 lb)   08/28/20 135.9 kg (299 lb 9.6 oz)   12/11/19 138.4 kg (305 lb 3.2 oz)               Review of Systems         Objective    BP (!) 144/89 (BP Location: Right arm, Patient Position: Sitting, Cuff Size: Adult Regular)   Pulse 77   Temp 98.3  F (36.8  C) (Oral)   Ht 2.007 m (6' 7\")   Wt 131.9 kg (290 lb 12.8 oz)   SpO2 96%   BMI 32.76 kg/m    Body mass index is 32.76 kg/m .  Physical Exam                   "

## 2021-05-04 ENCOUNTER — MYC MEDICAL ADVICE (OUTPATIENT)
Dept: FAMILY MEDICINE | Facility: CLINIC | Age: 76
End: 2021-05-04

## 2021-05-21 ENCOUNTER — OFFICE VISIT (OUTPATIENT)
Dept: FAMILY MEDICINE | Facility: CLINIC | Age: 76
End: 2021-05-21
Payer: COMMERCIAL

## 2021-05-21 VITALS
TEMPERATURE: 98.5 F | SYSTOLIC BLOOD PRESSURE: 145 MMHG | HEART RATE: 90 BPM | WEIGHT: 288.2 LBS | OXYGEN SATURATION: 96 % | BODY MASS INDEX: 33.35 KG/M2 | DIASTOLIC BLOOD PRESSURE: 86 MMHG | HEIGHT: 78 IN

## 2021-05-21 DIAGNOSIS — R09.89 THROAT CLEARING: ICD-10-CM

## 2021-05-21 DIAGNOSIS — G62.9 NEUROPATHY: Primary | ICD-10-CM

## 2021-05-21 DIAGNOSIS — R80.9 ALBUMINURIA: ICD-10-CM

## 2021-05-21 LAB
BILIRUBIN UR: NEGATIVE MG/DL
BLOOD UR: NEGATIVE MG/DL
ERYTHROCYTE [SEDIMENTATION RATE] IN BLOOD BY WESTERGREN METHOD: 7 MM/H (ref 0–20)
GLUCOSE URINE: NEGATIVE
HBA1C MFR BLD: 6 % (ref 0–5.6)
KETONES UR QL: NEGATIVE MG/DL
LEUKOCYTE ESTERASE UR: NEGATIVE
NITRITE UR QL STRIP: NEGATIVE MG/DL
PH UR STRIP: 8.5 [PH] (ref 4.5–8)
PROT UR-MCNC: 0.67 G/L
PROT/CREAT 24H UR: 0.42 G/G CR (ref 0–0.2)
PROTEIN UR: ABNORMAL MG/DL
SP GR UR STRIP: 1.02 (ref 1–1.03)
TSH SERPL DL<=0.005 MIU/L-ACNC: 1.38 MU/L (ref 0.4–4)
UROBILINOGEN UR STRIP-ACNC: ABNORMAL E.U./DL
VIT B12 SERPL-MCNC: 156 PG/ML (ref 193–986)

## 2021-05-21 PROCEDURE — 83036 HEMOGLOBIN GLYCOSYLATED A1C: CPT | Performed by: FAMILY MEDICINE

## 2021-05-21 PROCEDURE — 84443 ASSAY THYROID STIM HORMONE: CPT | Performed by: FAMILY MEDICINE

## 2021-05-21 PROCEDURE — 84165 PROTEIN E-PHORESIS SERUM: CPT | Performed by: FAMILY MEDICINE

## 2021-05-21 PROCEDURE — 81003 URINALYSIS AUTO W/O SCOPE: CPT | Performed by: FAMILY MEDICINE

## 2021-05-21 PROCEDURE — 36415 COLL VENOUS BLD VENIPUNCTURE: CPT | Performed by: FAMILY MEDICINE

## 2021-05-21 PROCEDURE — 99N1036 PR STATISTIC TOTAL PROTEIN: Performed by: FAMILY MEDICINE

## 2021-05-21 PROCEDURE — 99214 OFFICE O/P EST MOD 30 MIN: CPT | Performed by: FAMILY MEDICINE

## 2021-05-21 PROCEDURE — 84156 ASSAY OF PROTEIN URINE: CPT | Performed by: FAMILY MEDICINE

## 2021-05-21 PROCEDURE — 86038 ANTINUCLEAR ANTIBODIES: CPT | Performed by: FAMILY MEDICINE

## 2021-05-21 PROCEDURE — 82607 VITAMIN B-12: CPT | Performed by: FAMILY MEDICINE

## 2021-05-21 PROCEDURE — 85652 RBC SED RATE AUTOMATED: CPT | Performed by: FAMILY MEDICINE

## 2021-05-21 ASSESSMENT — MIFFLIN-ST. JEOR: SCORE: 2201.4

## 2021-05-21 NOTE — PROGRESS NOTES
"    Assessment & Plan     Neuropathy  Do full work up.   - Vitamin B12  - TSH  - Anti Nuclear Brianne IgG by IFA with Reflex  - Protein electrophoresis  - Erythrocyte sedimentation rate auto  - Hemoglobin A1c    Throat clearing  Will refer to ENT- - he has had this forawhile, and treatment of GERD has not helped.  Has had PND which when better has not really helped either although just better for 2 weeks now.   - OTOLARYNGOLOGY REFERRAL - INTERNAL    Albuminuria  Refer to nephrology.    - NEPHROLOGY ADULT REFERRAL - INTERNAL  - Urinalysis (UA) ([facility])  - Protein  random urine with Creat Ratio      36 minutes spent on the date of the encounter doing chart review, history and exam, documentation and further activities per the note       BMI:   Estimated body mass index is 32.47 kg/m  as calculated from the following:    Height as of this encounter: 2.007 m (6' 7\").    Weight as of this encounter: 130.7 kg (288 lb 3.2 oz).           No follow-ups on file.    Liliam Weinberg MD  [facility] KELI Camacho is a 75 year old who presents for the following health issues     HPI     Seen recently for persistent need to clear throat and chronic nasal drip. Tried Atrovent and found that his dripping is better and stopped after 2 weeks ago.  Since started it has had more dry mouth and is really bad  Tries to drink a lot of water, but so dry is clearing throat all the time, especially at bed time.   His chronic nasal drainage has been there for years, usually worse in the pelayo and better in the summer.    Omeprazole has not helped at all.     HTN:  Home BP measures: 125/87, 129/79, 115/78, 129/83  Second cuff.  Arm cuff. Has not gotten it validated                Review of Systems         Objective    BP (!) 145/86   Pulse 90   Temp 98.5  F (36.9  C) (Oral)   Ht 2.007 m (6' 7\")   Wt 130.7 kg (288 lb 3.2 oz)   SpO2 96%   BMI 32.47 kg/m    Body mass index is 32.47 kg/m .  Physical Exam   LE: " decreased vibratory sensation bilaterally up to his knees. Hands without decreased vibratory sense.   DTR - ankles 1+ bilaterally. Unable to get knee reflexes. No clonus.  Difficult to assess babinski - so uncomfortable.   Nl pulses. No edema.

## 2021-05-21 NOTE — PATIENT INSTRUCTIONS
Patient Education   Here is the plan from today's visit    1. Neuropathy  Mosby of lab work and I will get back to you by [facility].     - Hemoglobin A1c ([facility])  - Vitamin B12  - TSH  - Anti Nuclear Brianne IgG by IFA with Reflex  - Protein electrophoresis  - Erythrocyte sedimentation rate auto    2. Throat clearing  See ENT - they should call you but the number is included.   - OTOLARYNGOLOGY REFERRAL - INTERNAL    3. Albuminuria  Come on in with your cuff so we can confirm that it is working right   - NEPHROLOGY ADULT REFERRAL - INTERNAL  - Urinalysis (UA) ([facility])  - Protein  random urine with Creat Ratio      Please call or return to clinic if your symptoms don't go away.    Follow up plan  No follow-ups on file.    Thank you for coming to [facility] today.  Lab Testing:  **If you had lab testing today and your results are reassuring or normal they will be mailed to you or sent through [facility] within 7 days.   **If the lab tests need quick action we will call you with the results.  **If you are having labs done on a different day, please call [phone] to schedule at [facility] or [phone] for other [facility] locations.   The phone number we will call with results is # [phone] (home) . If this is not the best number please call our clinic and change the number.  Medication Refills:  If you need any refills please call your pharmacy and they will contact us.   If you need to  your refill at a new pharmacy, please contact the new pharmacy directly. The new pharmacy will help you get your medications transferred faster.   Scheduling:  If you have any concerns about today's visit or wish to schedule another appointment please call our office during normal business hours [phone] (8-5:00 M-F)  If a referral was made to a [facility] and you don't get a call from central scheduling please call [phone].  If a Mammogram was ordered for you at [facility] call [phone] to schedule or change your appointment.  If you had an EKG/XRay/CT/Ultrasound/MRI ordered the number is [phone] to schedule or change your radiology appointment.   Medical Concerns:  If you have urgent medical concerns please call [phone] at any time of the day.    Liliam Weinberg MD   No follow-ups on file.

## 2021-05-24 LAB
ALBUMIN SERPL ELPH-MCNC: 4.2 G/DL (ref 3.7–5.1)
ALPHA1 GLOB SERPL ELPH-MCNC: 0.4 G/DL (ref 0.2–0.4)
ALPHA2 GLOB SERPL ELPH-MCNC: 1 G/DL (ref 0.5–0.9)
ANA SER QL IF: NEGATIVE
B-GLOBULIN SERPL ELPH-MCNC: 0.8 G/DL (ref 0.6–1)
GAMMA GLOB SERPL ELPH-MCNC: 1 G/DL (ref 0.7–1.6)
M PROTEIN SERPL ELPH-MCNC: 0 G/DL
PROT PATTERN SERPL ELPH-IMP: ABNORMAL

## 2021-05-25 DIAGNOSIS — E53.8 VITAMIN B12 DEFICIENCY (NON ANEMIC): Primary | ICD-10-CM

## 2021-05-25 RX ORDER — LANOLIN ALCOHOL/MO/W.PET/CERES
2000 CREAM (GRAM) TOPICAL DAILY
Qty: 90 TABLET | Refills: 1 | Status: SHIPPED | OUTPATIENT
Start: 2021-05-25 | End: 2021-11-17

## 2021-05-26 DIAGNOSIS — I10 ESSENTIAL HYPERTENSION: ICD-10-CM

## 2021-05-26 RX ORDER — AMLODIPINE BESYLATE 5 MG/1
5 TABLET ORAL DAILY
Qty: 90 TABLET | Refills: 3 | Status: SHIPPED | OUTPATIENT
Start: 2021-05-26 | End: 2021-11-08 | Stop reason: ALTCHOICE

## 2021-05-26 NOTE — TELEPHONE ENCOUNTER
FUTURE VISIT INFORMATION      FUTURE VISIT INFORMATION:    Date: 8/6/21    Time: 1 PM    Location: [facility]  REFERRAL INFORMATION:    Referring provider:  Dr. Liliam Weinberg    [facility] providers clinic[facility]    Reason for visit/diagnosis: Throat Clearing    RECORDS REQUESTED FROM:       Clinic name Comments Records Status Imaging Status   Jacy 5/21/21 - [facility] OV with Dr. Lenard Roper                                       no

## 2021-05-26 NOTE — TELEPHONE ENCOUNTER

## 2021-07-19 NOTE — TELEPHONE ENCOUNTER
RECORDS RECEIVED FROM: Internal   DATE RECEIVED: 10.07.2021   NOTES STATUS DETAILS   OFFICE NOTE from referring provider Internal 05.21.2021 Liliam Weinberg MD   OFFICE NOTE from other specialist  N/A    *Only VASCULITIS or LUPUS gather office notes for the following N/A    *PULMONARY   N/A    *ENT N/A    *DERMATOLOGY N/A    *RHEUMATOLOGY N/A    DISCHARGE SUMMARY from hospital N/A    DISCHARGE REPORT from the ER N/A    MEDICATION LIST Internal    IMAGING  (NEED IMAGES AND REPORTS)     KIDNEY CT SCAN N/A    KIDNEY ULTRASOUND N/A    MR ABDOMEN N/A    NUCLEAR MEDICINE RENAL N/A    LABS     CBC Internal 02.12.2018   CMP Internal 02.12.2018   BMP Internal 05.03.2021   UA Internal 05.21.2021   URINE PROTEIN Internal 05.21.2021   RENAL PANEL N/A    BIOPSY     KIDNEY BIOPSY  Internal

## 2021-07-29 ENCOUNTER — TRANSFERRED RECORDS (OUTPATIENT)
Dept: HEALTH INFORMATION MANAGEMENT | Facility: CLINIC | Age: 76
End: 2021-07-29

## 2021-08-05 ENCOUNTER — TELEPHONE (OUTPATIENT)
Dept: OTOLARYNGOLOGY | Facility: CLINIC | Age: 76
End: 2021-08-05

## 2021-08-05 NOTE — TELEPHONE ENCOUNTER
Writer attempted to contact patient to confirm appointment 8/6/21 1:00 PM. A detailed voicemail and mychart message was left for the patient.    Mohan Higgins, EMT

## 2021-08-06 ENCOUNTER — OFFICE VISIT (OUTPATIENT)
Dept: OTOLARYNGOLOGY | Facility: CLINIC | Age: 76
End: 2021-08-06
Payer: COMMERCIAL

## 2021-08-06 ENCOUNTER — VIRTUAL VISIT (OUTPATIENT)
Dept: OTOLARYNGOLOGY | Facility: CLINIC | Age: 76
End: 2021-08-06
Attending: FAMILY MEDICINE
Payer: COMMERCIAL

## 2021-08-06 ENCOUNTER — PRE VISIT (OUTPATIENT)
Dept: OTOLARYNGOLOGY | Facility: CLINIC | Age: 76
End: 2021-08-06

## 2021-08-06 VITALS
OXYGEN SATURATION: 97 % | TEMPERATURE: 99.1 F | WEIGHT: 290 LBS | HEIGHT: 78 IN | BODY MASS INDEX: 33.55 KG/M2 | HEART RATE: 83 BPM

## 2021-08-06 DIAGNOSIS — R09.89 CHRONIC THROAT CLEARING: ICD-10-CM

## 2021-08-06 DIAGNOSIS — R49.0 DYSPHONIA: Primary | ICD-10-CM

## 2021-08-06 PROCEDURE — 92524 BEHAVRAL QUALIT ANALYS VOICE: CPT | Mod: GN | Performed by: SPEECH-LANGUAGE PATHOLOGIST

## 2021-08-06 PROCEDURE — 99203 OFFICE O/P NEW LOW 30 MIN: CPT | Mod: 25 | Performed by: OTOLARYNGOLOGY

## 2021-08-06 PROCEDURE — 31575 DIAGNOSTIC LARYNGOSCOPY: CPT | Performed by: OTOLARYNGOLOGY

## 2021-08-06 ASSESSMENT — PAIN SCALES - GENERAL: PAINLEVEL: NO PAIN (0)

## 2021-08-06 ASSESSMENT — MIFFLIN-ST. JEOR: SCORE: 2209.56

## 2021-08-06 NOTE — PATIENT INSTRUCTIONS
1.  You were seen in the ENT Clinic today by . If you have any questions or concerns after your appointment, please call [phone]. Press option #1 for scheduling related needs. Press option #3 for Nurse advice.    2.   has recommended  the following:   - cough/throat clearing suppression therapy    3.  Plan is to return to clinic as needed.      Beth Ocasio LPN  [phone]  [facility] - Otolaryngology

## 2021-08-06 NOTE — PROGRESS NOTES
[facility]   at the [facility]   Otolaryngology Clinic     Patient: Doug Nieves    MRN: [id number]    : 1945    Age/Gender: 75 year old male  Date of Service: 2021  Rendering Provider:   Martina Polanco MD     Referring Provider   PCP: Liliam Weinberg  Referring Physician: Liliam Weinberg MD  [address] [id number]  Reason for Consultation   Chronic throat clearing  History   HISTORY OF PRESENT ILLNESS: I was asked to consult on Doug Nieves, by Dr Liliam Weinberg for evaluation of throat clearing . Mr. Nieves is a 75 year old male who presents to us today with chronic throat clearing        He presents today for evaluation. he reports:  Dysphagia   - No swallowing component  - He is very cognizant of his diet   - He hydrate well   - No swallowing problems    Dysphonia  - Denies     Dyspnea   - Denies   - Denies CXR  - Losartan - end of May     Throat clearing  - A 5-8 year history of throat clearing  - No idea of precipitating factors   - He tried allergy meds, etc  - Jacob s vapor helps  - He does feel dry  - No coughing  - Does gargling and saline nasal irrigation   - Does not feel a tickle  - There is congestion   - It lessens in the afternoon  - By late evening it s not too bad  - It heightens before bed  - He sleeps ok  - He feels dryness  - Initially he thought he had relief with water  - That s not working as well now  - 12min period - 31 times  - He felt something there  - Does not trigger with talking, not with food, physical activity, laughing, scents/smells/perfumes, not with temperature changes, denies with laying down   - Annoying to both wife and himself       GERD/LPRD  - Had GI work up a few years ago for heartburn   - He was placed on ranitidine   - Then omeprazole   - Not sure how much he needs it  - Denies AM   - Does have more mucus and throat clearing when he first wakes up    Nasal spray   - Uses a nasal spray - saline   - Never  had a sinus infection   - Does not feel postnasal  drip      No neck surgeries      PAST MEDICAL HISTORY:   Past Medical History:   Diagnosis Date     Gastro-oesophageal reflux disease      Hypertension      Quadriceps tendon rupture 3/16/2013       PAST SURGICAL HISTORY:   Past Surgical History:   Procedure Laterality Date     BIOPSY OF SKIN LESION       COLONOSCOPY       COLONOSCOPY N/A 1/8/2021    Procedure: COLONOSCOPY, WITH POLYPECTOMY;  Surgeon: Abraham Mohan MD;  Location: [facility] OR     HERNIA REPAIR       REPAIR TENDON QUADRICEPS  3/20/2013    Procedure: REPAIR TENDON QUADRICEPS;  Left Knee Quadricep Tendon Repair   ;  Surgeon: Jorge Taylor MD;  [facility]       CURRENT MEDICATIONS:   Current Outpatient Medications:      amLODIPine (NORVASC) 5 MG tablet, Take 1 tablet (5 mg) by mouth daily, Disp: 90 tablet, Rfl: 3     aspirin (ASPIRIN LOW DOSE) 81 MG tablet, Take 1 tablet by mouth daily., Disp: , Rfl:      atorvastatin (LIPITOR) 40 MG tablet, Take 1 tablet (40 mg) by mouth daily, Disp: 90 tablet, Rfl: 3     cyanocobalamin (VITAMIN B-12) 1000 MCG tablet, Take 2 tablets (2,000 mcg) by mouth daily For two weeks and then drop to 1 tablet daily, Disp: 90 tablet, Rfl: 1     guaiFENesin (MUCINEX) 600 MG 12 hr tablet, Take 400 mg by mouth 2 times daily , Disp: , Rfl:      hydrochlorothiazide (HYDRODIURIL) 25 MG tablet, Take 0.5 tablets (12.5 mg) by mouth daily, Disp: 90 tablet, Rfl: 3     ipratropium (ATROVENT) 0.03 % nasal spray, Spray 2 sprays into both nostrils every 12 hours, Disp: 30 mL, Rfl: 3     losartan (COZAAR) 50 MG tablet, Take 1 tablet (50 mg) by mouth At Bedtime, Disp: 90 tablet, Rfl: 1     omeprazole (PRILOSEC) 20 MG DR capsule, Take 1 capsule (20 mg) by mouth daily, Disp: 90 capsule, Rfl: 3     triamcinolone (KENALOG) 0.1 % cream, Apply sparingly to affected area three times daily for 14 days., Disp: 30 g, Rfl: 0    ALLERGIES: Patient has no known allergies.    SOCIAL  HISTORY:    Social History     Socioeconomic History     Marital status:      Spouse name: Not on file     Number of children: Not on file     Years of education: Not on file     Highest education level: Not on file   Occupational History     Not on file   Tobacco Use     Smoking status: Former Smoker     Quit date: 3/18/1980     Years since quittin.4     Smokeless tobacco: Never Used     Tobacco comment: Quit many years ago   Substance and Sexual Activity     Alcohol use: Yes     Comment: occasional      Drug use: No     Sexual activity: Not on file   Other Topics Concern     Parent/sibling w/ CABG, MI or angioplasty before 65F 55M? Not Asked   Social History Narrative     Not on file     Social Determinants of Health     Financial Resource Strain:      Difficulty of Paying Living Expenses:    Food Insecurity:      Worried About Running Out of Food in the Last Year:      Ran Out of Food in the Last Year:    Transportation Needs:      Lack of Transportation (Medical):      Lack of Transportation (Non-Medical):    Physical Activity:      Days of Exercise per Week:      Minutes of Exercise per Session:    Stress:      Feeling of Stress :    Social Connections:      Frequency of Communication with Friends and Family:      Frequency of Social Gatherings with Friends and Family:      Attends Bahai Services:      Active Member of Clubs or Organizations:      Attends Club or Organization Meetings:      Marital Status:    Intimate Partner Violence:      Fear of Current or Ex-Partner:      Emotionally Abused:      Physically Abused:      Sexually Abused:          FAMILY HISTORY:   Family History   Problem Relation Age of Onset     Diabetes Mother      Alzheimer Disease Mother      Diabetes Brother      Diabetes Brother      Diabetes Brother      Diabetes Brother      Genitourinary Problems Father         CKD secondary to being quad     Non-contributory for problems with anesthesia    REVIEW OF SYSTEMS:   The  patient was asked a 14 point review of systems regarding constitutional symptoms, eye symptoms, ears, nose, mouth, throat symptoms, cardiovascular symptoms, respiratory symptoms, gastrointestinal symptoms, genitourinary symptoms, musculoskeletal symptoms, integumentary symptoms, neurological symptoms, psychiatric symptoms, endocrine symptoms, hematologic/lymphatic symptoms, and allergic/ immunologic symptoms.   The pertinent factors have been included in the HPI and below.  Patient Supplied Answers to Review of Systems  [facility] ROS 8/3/2021   Ears, Nose, Throat Nasal congestion or drainage   Gastrointestinal/Genitourinary Heartburn/indigestion       Physical Examination   The patient underwent a physical examination as described below. The pertinent positive and negative findings are summarized after the description of the examination.  Constitutional: The patient's developmental and nutritional status was assessed. The patient's voice quality was assessed.  Head and Face: The head and face were inspected for deformities. The sinuses were palpated. The salivary glands were palpated. Facial muscle strength was assessed bilaterally.  Eyes: Extraocular movements and primary gaze alignment were assessed.  Ears, Nose, Mouth and Throat: The ears and nose were examined for deformities. The nasal septum, mucosa, and turbinates were inspected by anterior rhinoscopy. The lips, teeth, and gums were examined for abnormalities. The oral mucosa, tongue, palate, tonsils, lateral and posterior pharynx were inspected for the presence of asymmetry or mucosal lesions.    Neck: The tracheal position was noted, and the neck mass palpated to determine if there were any asymmetries, abnormal neck masses, thyromegally, or thyroid nodules.  Respiratory: The nature of the breathing and chest expansion/symmetry was observed.  Cardiovascular: The patient was examined to determine the presence of any edema or jugular venous distension.  Abdomen:  The contour of the abdomen was noted.  Lymphatic: The patient was examined for infraclavicular lymphadenopathy.  Musculoskeletal: The patient was inspected for the presence of skeletal deformities.  Extremities: The extremities were examined for any clubbing or cyanosis.  Skin: The skin was examined for inflammatory or neoplastic conditions.  Neurologic: The patient's orientation, mood, and affect were noted. The cranial nerve  functions were examined.  Other pertinent positive and negative findings on physical examination:      OC/OP: no lesions, uvula midline, soft palate elevates symmetrically   Neck: no lesions, no TH tenderness to palpation    All other physical examination findings were within normal limits and noncontributory.  Procedures   Flexible laryngoscopy (CPT 01613)      Pre-procedure diagnosis: chronic throat clearing  Post-procedure diagnosis: same as above  Indication for procedure: Mr. Nieves is a 75 year old male with see above  Procedure(s): Fiberoptic Laryngoscopy    Details of Procedure: After informed consent was obtained, the patient was seated in the examination chair.  The areas of the nasopharynx as well as the hypopharynx were anesthetized with topical 4% lidocaine with 0.25% phenylephrine atomizer.  Examination of the base of tongue was performed first.  Attention was directed to any evidence of masses in the area or evidence of leukoplakia or candidal infection.  Attention was directed to the epiglottis where its size and position was determined and its movement on phonation of the vowel  e .  The piriform sinuses were then inspected for any mass lesions or pooling of secretions.  Attention was then directed to the larynx. The vocal folds were inspected for infection or any areas of leukoplakia, for masses, polypoid degeneration, or hemorrhage.  Having done this, the arytenoids and vocal processes were inspected for erythema or evidence of granuloma formation.  The posterior commissure  was then inspected for evidence of inflammatory changes in the mucosa and heaping up of mucosal tissue. The patient was then instructed to say the vowel  e .  Adduction of vocal folds to the midline was observed for any evidence of paresis or paralysis of the larynx or asymmetry in rotation of the larynx to the left or right. The patient was asked to breathe and the degree of abduction was noted bilaterally.  Subglottic view of the larynx was obtained for any additional mass lesions or mucosal changes.  Finally the post cricoid was examined for evidence of pooling of secretions, as well as the pharyngeal wall mucosa.   Anesthesia type: 0.25% phenylephrine    Findings:  Anatomic/physiological deviations: small amount of mucus over supraglottis   Right vocal process: No restriction of mobility   Left vocal process: No restriction of mobility  Glottal gap: Complete glottal closure  Supraglottic structures: Normal  Hypopharynx: Normal     Estimated Blood Loss: minimal  Complications: None  Disposition: Patient tolerated the procedure well                        Review of Relevant Clinical Data   Notes: Liliam Weinberg 5/21/21     Labs:  Lab Results   Component Value Date    TSH 1.38 05/21/2021     Lab Results   Component Value Date     05/03/2021    CO2 27 05/03/2021    BUN 10 05/03/2021     Lab Results   Component Value Date    WBC 6.8 02/12/2018    HGB 15.8 02/12/2018    HCT 48.2 02/12/2018    MCV 87 02/12/2018     02/12/2018     No results found for: PT, PTT, INR  No results found for: ANGELI  No components found for: RHEUMATOIDFACTOR,  RF  No results found for: CRP  No components found for: CKTOT, URICACID  No components found for: C3, C4, DSDNAAB, NDNAABIFA  No results found for: MPOAB    Patient reported Quality of Life (QOL) Measures   Patient Supplied Answers To VHI Questionnaire  No flowsheet data found.      Patient Supplied Answers To EAT Questionnaire  No flowsheet data found.      Patient Supplied  Answers To CSI Questionnaire  No flowsheet data found.      Patient Supplied Answers to Dyspnea Index Questionnaire:  No flowsheet data found.    Impression & Plan     IMPRESSION: Mr. Nieves is a 75 year old male who is being seen for the followin. Chronic throat clearing   - started 8 years ago   - has gotten worse overtime  - associated with sensation of something there   - has not had a recent chest xray  - on losaratan since May, prior to this he was on enalapril  - allergy triggers and work up: denies  - pulmonary triggers and work up: denies, improves with physical activity  - GI triggers and work up: denies, though he is on omeprazole  - ENT triggers and work up: not worse with talking and smells, scents  - scope shows small amount of mucus over supraglottis  - symptoms likely due to irritable larynx syndrome vs acei/arb use   Plan  - consider CXR and coming off losartan  - cough/throat clearing suppression therapy     2. Reported right abdominal protuberance with coughing, no obvious mass seen - recommend following up with PCP    RETURN VISIT: as needed    Thank you for the kind referral and for allowing me to share in the care of Mr. Nieves. If you have any questions, please do not hesitate to contact me.    Martina Polanco MD    Laryngology    [facility]  Department of  Otolaryngology - Head and Neck Surgery  Clinics & Surgery Center  [address] [place] [address]  Appointment line: [phone]  Fax: [phone]  [email or web address]

## 2021-08-06 NOTE — Clinical Note
Hi Beth  Can you fax my note and Dr You's when ready to his PCP _ Dr Weinberg and say thank you for your referral. His scope exam is normal. He is going to start throat clearing suppression therapy. I also recommend to consider a CXR, and coming off losartan. Thank you, Martina

## 2021-08-06 NOTE — LETTER
2021       RE: Doug Nieves  [address]  [place] [address]     Dear Colleague,    Thank you for referring your patient, Doug Nieves, to the [facility] [place] at [facility]. Please see a copy of my visit note below.        [facility]   at the [facility]   Otolaryngology Clinic     Patient: Doug Nieves    MRN: [id number]    : 1945    Age/Gender: 75 year old male  Date of Service: 2021  Rendering Provider:   Martina Polanco MD     Referring Provider   PCP: Liliam Weinberg  Referring Physician: Liliam Weinberg MD  2020 [address] [address]  Reason for Consultation   Chronic throat clearing  History   HISTORY OF PRESENT ILLNESS: I was asked to consult on Doug Nieves, by Dr Liliam Weinberg for evaluation of throat clearing . Mr. Nieves is a 75 year old male who presents to us today with chronic throat clearing        He presents today for evaluation. he reports:  Dysphagia   - No swallowing component  - He is very cognizant of his diet   - He hydrate well   - No swallowing problems    Dysphonia  - Denies     Dyspnea   - Denies   - Denies CXR  - Losartan - end of May     Throat clearing  - A 5-8 year history of throat clearing  - No idea of precipitating factors   - He tried allergy meds, etc  - Jacob s vapor helps  - He does feel dry  - No coughing  - Does gargling and saline nasal irrigation   - Does not feel a tickle  - There is congestion   - It lessens in the afternoon  - By late evening it s not too bad  - It heightens before bed  - He sleeps ok  - He feels dryness  - Initially he thought he had relief with water  - That s not working as well now  - 12min period - 31 times  - He felt something there  - Does not trigger with talking, not with food, physical activity, laughing, scents/smells/perfumes, not with temperature changes, denies with  laying down   - Annoying to both wife and himself       GERD/LPRD  - Had GI work up a few years ago for heartburn   - He was placed on ranitidine   - Then omeprazole   - Not sure how much he needs it  - Denies AM   - Does have more mucus and throat clearing when he first wakes up    Nasal spray   - Uses a nasal spray - saline   - Never had a sinus infection   - Does not feel postnasal  drip      No neck surgeries      PAST MEDICAL HISTORY:   Past Medical History:   Diagnosis Date     Gastro-oesophageal reflux disease      Hypertension      Quadriceps tendon rupture 3/16/2013       PAST SURGICAL HISTORY:   Past Surgical History:   Procedure Laterality Date     BIOPSY OF SKIN LESION       COLONOSCOPY       COLONOSCOPY N/A 1/8/2021    Procedure: COLONOSCOPY, WITH POLYPECTOMY;  Surgeon: Abraham Mohan MD;  Location: [facility] OR     HERNIA REPAIR       REPAIR TENDON QUADRICEPS  3/20/2013    Procedure: REPAIR TENDON QUADRICEPS;  Left Knee Quadricep Tendon Repair   ;  Surgeon: Jorge Taylor MD;  [facility]       CURRENT MEDICATIONS:   Current Outpatient Medications:      amLODIPine (NORVASC) 5 MG tablet, Take 1 tablet (5 mg) by mouth daily, Disp: 90 tablet, Rfl: 3     aspirin (ASPIRIN LOW DOSE) 81 MG tablet, Take 1 tablet by mouth daily., Disp: , Rfl:      atorvastatin (LIPITOR) 40 MG tablet, Take 1 tablet (40 mg) by mouth daily, Disp: 90 tablet, Rfl: 3     cyanocobalamin (VITAMIN B-12) 1000 MCG tablet, Take 2 tablets (2,000 mcg) by mouth daily For two weeks and then drop to 1 tablet daily, Disp: 90 tablet, Rfl: 1     guaiFENesin (MUCINEX) 600 MG 12 hr tablet, Take 400 mg by mouth 2 times daily , Disp: , Rfl:      hydrochlorothiazide (HYDRODIURIL) 25 MG tablet, Take 0.5 tablets (12.5 mg) by mouth daily, Disp: 90 tablet, Rfl: 3     ipratropium (ATROVENT) 0.03 % nasal spray, Spray 2 sprays into both nostrils every 12 hours, Disp: 30 mL, Rfl: 3     losartan (COZAAR) 50 MG tablet, Take 1 tablet (50 mg)  by mouth At Bedtime, Disp: 90 tablet, Rfl: 1     omeprazole (PRILOSEC) 20 MG DR capsule, Take 1 capsule (20 mg) by mouth daily, Disp: 90 capsule, Rfl: 3     triamcinolone (KENALOG) 0.1 % cream, Apply sparingly to affected area three times daily for 14 days., Disp: 30 g, Rfl: 0    ALLERGIES: Patient has no known allergies.    SOCIAL HISTORY:    Social History     Socioeconomic History     Marital status:      Spouse name: Not on file     Number of children: Not on file     Years of education: Not on file     Highest education level: Not on file   Occupational History     Not on file   Tobacco Use     Smoking status: Former Smoker     Quit date: 3/18/1980     Years since quittin.4     Smokeless tobacco: Never Used     Tobacco comment: Quit many years ago   Substance and Sexual Activity     Alcohol use: Yes     Comment: occasional      Drug use: No     Sexual activity: Not on file   Other Topics Concern     Parent/sibling w/ CABG, MI or angioplasty before 65F 55M? Not Asked   Social History Narrative     Not on file     Social Determinants of Health     Financial Resource Strain:      Difficulty of Paying Living Expenses:    Food Insecurity:      Worried About Running Out of Food in the Last Year:      Ran Out of Food in the Last Year:    Transportation Needs:      Lack of Transportation (Medical):      Lack of Transportation (Non-Medical):    Physical Activity:      Days of Exercise per Week:      Minutes of Exercise per Session:    Stress:      Feeling of Stress :    Social Connections:      Frequency of Communication with Friends and Family:      Frequency of Social Gatherings with Friends and Family:      Attends Hinduism Services:      Active Member of Clubs or Organizations:      Attends Club or Organization Meetings:      Marital Status:    Intimate Partner Violence:      Fear of Current or Ex-Partner:      Emotionally Abused:      Physically Abused:      Sexually Abused:          FAMILY HISTORY:    Family History   Problem Relation Age of Onset     Diabetes Mother      Alzheimer Disease Mother      Diabetes Brother      Diabetes Brother      Diabetes Brother      Diabetes Brother      Genitourinary Problems Father         CKD secondary to being quad     Non-contributory for problems with anesthesia    REVIEW OF SYSTEMS:   The patient was asked a 14 point review of systems regarding constitutional symptoms, eye symptoms, ears, nose, mouth, throat symptoms, cardiovascular symptoms, respiratory symptoms, gastrointestinal symptoms, genitourinary symptoms, musculoskeletal symptoms, integumentary symptoms, neurological symptoms, psychiatric symptoms, endocrine symptoms, hematologic/lymphatic symptoms, and allergic/ immunologic symptoms.   The pertinent factors have been included in the HPI and below.  Patient Supplied Answers to Review of Systems  [facility] E[facility] ROS 8/3/2021   Ears, Nose, Throat Nasal congestion or drainage   Gastrointestinal/Genitourinary Heartburn/indigestion       Physical Examination   The patient underwent a physical examination as described below. The pertinent positive and negative findings are summarized after the description of the examination.  Constitutional: The patient's developmental and nutritional status was assessed. The patient's voice quality was assessed.  Head and Face: The head and face were inspected for deformities. The sinuses were palpated. The salivary glands were palpated. Facial muscle strength was assessed bilaterally.  Eyes: Extraocular movements and primary gaze alignment were assessed.  Ears, Nose, Mouth and Throat: The ears and nose were examined for deformities. The nasal septum, mucosa, and turbinates were inspected by anterior rhinoscopy. The lips, teeth, and gums were examined for abnormalities. The oral mucosa, tongue, palate, tonsils, lateral and posterior pharynx were inspected for the presence of asymmetry or mucosal lesions.    Neck: The tracheal position was  noted, and the neck mass palpated to determine if there were any asymmetries, abnormal neck masses, thyromegally, or thyroid nodules.  Respiratory: The nature of the breathing and chest expansion/symmetry was observed.  Cardiovascular: The patient was examined to determine the presence of any edema or jugular venous distension.  Abdomen: The contour of the abdomen was noted.  Lymphatic: The patient was examined for infraclavicular lymphadenopathy.  Musculoskeletal: The patient was inspected for the presence of skeletal deformities.  Extremities: The extremities were examined for any clubbing or cyanosis.  Skin: The skin was examined for inflammatory or neoplastic conditions.  Neurologic: The patient's orientation, mood, and affect were noted. The cranial nerve  functions were examined.  Other pertinent positive and negative findings on physical examination:      OC/OP: no lesions, uvula midline, soft palate elevates symmetrically   Neck: no lesions, no TH tenderness to palpation    All other physical examination findings were within normal limits and noncontributory.  Procedures   Flexible laryngoscopy (CPT 57094)      Pre-procedure diagnosis: chronic throat clearing  Post-procedure diagnosis: same as above  Indication for procedure: Mr. Nieves is a 75 year old male with see above  Procedure(s): Fiberoptic Laryngoscopy    Details of Procedure: After informed consent was obtained, the patient was seated in the examination chair.  The areas of the nasopharynx as well as the hypopharynx were anesthetized with topical 4% lidocaine with 0.25% phenylephrine atomizer.  Examination of the base of tongue was performed first.  Attention was directed to any evidence of masses in the area or evidence of leukoplakia or candidal infection.  Attention was directed to the epiglottis where its size and position was determined and its movement on phonation of the vowel  e .  The piriform sinuses were then inspected for any mass  lesions or pooling of secretions.  Attention was then directed to the larynx. The vocal folds were inspected for infection or any areas of leukoplakia, for masses, polypoid degeneration, or hemorrhage.  Having done this, the arytenoids and vocal processes were inspected for erythema or evidence of granuloma formation.  The posterior commissure was then inspected for evidence of inflammatory changes in the mucosa and heaping up of mucosal tissue. The patient was then instructed to say the vowel  e .  Adduction of vocal folds to the midline was observed for any evidence of paresis or paralysis of the larynx or asymmetry in rotation of the larynx to the left or right. The patient was asked to breathe and the degree of abduction was noted bilaterally.  Subglottic view of the larynx was obtained for any additional mass lesions or mucosal changes.  Finally the post cricoid was examined for evidence of pooling of secretions, as well as the pharyngeal wall mucosa.   Anesthesia type: 0.25% phenylephrine    Findings:  Anatomic/physiological deviations: small amount of mucus over supraglottis   Right vocal process: No restriction of mobility   Left vocal process: No restriction of mobility  Glottal gap: Complete glottal closure  Supraglottic structures: Normal  Hypopharynx: Normal     Estimated Blood Loss: minimal  Complications: None  Disposition: Patient tolerated the procedure well                        Review of Relevant Clinical Data   Notes: Liliam Weinberg 5/21/21     Labs:  Lab Results   Component Value Date    TSH 1.38 05/21/2021     Lab Results   Component Value Date     05/03/2021    CO2 27 05/03/2021    BUN 10 05/03/2021     Lab Results   Component Value Date    WBC 6.8 02/12/2018    HGB 15.8 02/12/2018    HCT 48.2 02/12/2018    MCV 87 02/12/2018     02/12/2018     No results found for: PT, PTT, INR  No results found for: ANGELI  No components found for: RHEUMATOIDFACTOR,  RF  No results found for:  CRP  No components found for: CKTOT, URICACID  No components found for: C3, C4, DSDNAAB, NDNAABIFA  No results found for: MPOAB    Patient reported Quality of Life (QOL) Measures   Patient Supplied Answers To VHI Questionnaire  No flowsheet data found.      Patient Supplied Answers To EAT Questionnaire  No flowsheet data found.      Patient Supplied Answers To CSI Questionnaire  No flowsheet data found.      Patient Supplied Answers to Dyspnea Index Questionnaire:  No flowsheet data found.    Impression & Plan     IMPRESSION: Mr. Nieves is a 75 year old male who is being seen for the followin. Chronic throat clearing   - started 8 years ago   - has gotten worse overtime  - associated with sensation of something there   - has not had a recent chest xray  - on losaratan since May, prior to this he was on enalapril  - allergy triggers and work up: denies  - pulmonary triggers and work up: denies, improves with physical activity  - GI triggers and work up: denies, though he is on omeprazole  - ENT triggers and work up: not worse with talking and smells, scents  - scope shows small amount of mucus over supraglottis  - symptoms likely due to irritable larynx syndrome vs acei/arb use   Plan  - consider CXR and coming off losartan  - cough/throat clearing suppression therapy     2. Reported right abdominal protuberance with coughing, no obvious mass seen - recommend following up with PCP    RETURN VISIT: as needed    Thank you for the kind referral and for allowing me to share in the care of Mr. Nieves. If you have any questions, please do not hesitate to contact me.    Martina Polanco MD    Laryngology    [facility]  Department of  Otolaryngology - Head and Neck Surgery  [facility] [place] [address]  Appointment line: [phone]  Fax: [phone]  [email or web address]

## 2021-08-06 NOTE — PROGRESS NOTES
Doug Nieves is a 75 year old male who is being evaluated via a billable video visit.      Doug has been notified and verbally consented to the following:     This video visit will be conducted between you and your provider.    Patient has opted to conduct today's video visit vs an in-person appointment.     Video visits are billed at different rates depending on your insurance coverage. Please reach out to your insurance provider with any questions.     If during the course of the call the provider feels the appointment is not appropriate, you will not be charged for this service.  Provider has received verbal consent for billable virtual visit from the patient? Yes  Will anyone else be joining your video visit? This is a joint visit with Dr. Polanco, who is seeing the patient in the clinic    Call initiated at: 1:05   Type of Visit Platform Used: [employer] Video  Location of provider: Home  Location of patient: [facility]  Yariel Vera Jr., M.D., F.A.C.S.  Becki Kraft M.D., M.P.H.  Martina Polanco M.D.  Mariela You, Ph.D., CCC-SLP  Pranav Ashby, Ph.D., [facility]-SLP  Juli Avitia M.M. (voice), M.A., CCC-SLP  Trevor Jacobsen M.M. (voice), M.A., CCC-SLP  TODD Bose (voice), M.S., CCC-SLP    [facility]  VOICE/SPEECH/BREATHING EVALUATION AND LARYNGEAL EXAMINATION REPORT    Patient: Doug Nieves  Date of Visit: 8/6/2021    Clinician: Mariela You, Ph.D., CCC/SLP  Seen in conjunction with: Dr. Martina Polanco  Referring Physician: PCP Dr. Matthew    CHIEF COMPLAINT:  Throat-clearing    HISTORY  Doug Nieves is a 75 year old presenting today for evaluation of throat-clearing.    Please refer to Dr. Polanco s dictation for a more complete history and impressions.   Salient details of his history are as follows:    Onset: 5-8 years, gradual onset, no idea of precipitating factors, though worse in winter months    Talked to PCP; tried allergy medication no help    Has  tried antihistamines, expectorants, and Vicks OTC throat moisturizer    Worsening; now 24/7; getting more annoying    Realizes it's not URI    Throat-clear, not a cough    Nonproductive    Not painful    Unaware of triggers; no tickle    Thinks he clears less when he is physically active    Throat clear is dry; feels nasal congestion but knows nothing would come out if he blows his nose    He does a daily regimen of gargling and saline nasal irrigation; he thinks they help, but he still feels dry    He hydrates well systemically    He uses Vicks Vapo-Rub cough drops    He has been on omeprazole for about 3-5 years; at some point there was a GI work-up with chest pain/heartburn as the presenting symptom    No hoarseness from throat-clearing    Throat clear does not seem related to voice use    Voice is fine    No swallowing problems    No breathing problems; able to exercise vigorously    Recent new blood pressure medication; no effect on throat-clearing    This is first ENT visit    DIAGNOSIS/REASON FOR REFERRAL  Throat-clearing/ Evaluate, perform laryngeal exam, treat as appropriate    CURRENT SYMPTOMS INCLUDE:  VOICE    No problem  THROAT CLEARING    Frequent, dry, annoying    No triggers  SWALLOWING    No problem  BREATHING    No problem    OTHER PERTINENT HISTORY    Generally unremarkable; please see Dr. Polanco's note    OBJECTIVE FINDINGS  VOICE/ SPEECH/ NON-COMMUNICATIVE LARYNGEAL BEHAVIORS EVALUATION  An evaluation of the voice, breathing, and throat-clear was accomplished today; salient features are as follows:    Palpation of the laryngeal area shows:    No tenderness of the thyrohyoid area     Palpation does not trigger desire to throat clear    Reduced thyrohyoid space     Cough/ Throat clear:    Variable; can be frequent, but can also talk for 5 minutes without throat-clear    Dry    Locus of cough/ throat clear: sounds consistent with upper airway    Mild to moderate in severity    Throat clearing  increases considerably after the laryngeal exam    Breathing pattern:    Appears within normal limits and adequate     Mid-thoracic muscle use pattern    No overt tension is evident.    Voice quality is characterized by    Clear, robust, WNL    No roughness, breathiness, strain, or transient vocal disturbances    Habitual pitch is WNL in the range from A2 to D3, with gooduse of pitch inflection    Loudness is WNL and appropriate for the setting    Sustained vowels:     Comfortable pitch /a/: B2 - robust and clear    Comfortable pitch /i/: F#3 - clear and normal    In a pitch glide task to determine pitch range, he glides easily up to F4 in [facility]    LARYNGEAL EXAMINATION    Endoscopic laryngeal examination was performed by:  Dr. Polanco  I provided the protocol of instructions for the patient.  Type of exam:   Flexible endoscopy with chip-tip technology    This exam shows:  Laryngeal and Vocal Fold Mucosa    Essentially healthy laryngeal mucosa    Presence of secretions is unremarkable    No particular signs of reflux    Status of vocal fold mucosa:   o Within normal limits, with no lesions and straight vibratory margins    Neurological and Functional Integrity of the Larynx    Vocal folds are mobile and meet at midline; movement is brisk and symmetric; exam is neurologically normal     Normal function is evident during a task of 20 quickly repeated vowels    Elongation of the vocal folds for pitch increase is within normal    On phonation, glottic closure is normal    Supraglottic Function and Therapy Probes    Supraglottic function during speech and other phonatory tasks is unremarkable; any constriction is well within normal limits    Stroboscopy was not warranted.    Dr. Polanco and I reviewed this laryngeal exam with Mr. Nieves today, and I provided pertinent explanations:    Endoscopic findings are consistent with audio-perceptual assessment and patient Hx/complaints.    his symptoms are accounted for by  probable mucosal irritation that cannot be observed, but is likely given his history    Because chronic throat clearing and irritable larynx respond well to functional therapy, we recommend this as a first intervention    ASSESSMENT / PLAN  IMPRESSIONS:  This evaluation has resulted in the following diagnosis/diagnoses for Mr. Nieves  Chronic Throat Clearing (R68.89)  Dysphonia (R49.0)    Laryngeal evaluation demonstrated healthy mucosa and within normal limits laryngeal function    It is possible there is some mild muscular hyperfunction that is also a laryngeal irritant    Perceptual evaluation demonstrated frequent throat clearing  RECOMMENDATIONS:     A course of speech therapy is recommended to help reduce chronic cough and mucosal irritation.    He demonstrates a Good prognosis for improvement given adherence to therapeutic recommendations. Therapeutic     Positive indicators: commitment to process; diagnosis is known to respond to functional treatment;     Negative indicators: none    TREATMENT PLAN  Speech therapy    DURATION/FREQUENCY OF TREATMENT  Three weekly or bi-weekly one-hour sessions, with two monthly one-hour follow-up sessions    GOALS  Patient goal:   To reduce his throat clearing to acceptable levels    Short-term goal(s): Within the first2 sessions, Mr. Nieves:  1.  will demonstrate improved awareness of throat clearing: acknowledging >75% of all throat clearing events during session time with no clinician support  2.  will be able to demonstrate provided throat-clear suppression and substitution strategies from memory independently with 90% accuracy    Long-term goal(s): In 4 months, Mr. Nieves will:  1.  Report a week with no more than two episodes of coughing or throat-clearing per day, that do not last more than two seconds    This treatment plan was developed with the patient who agreed with the recommendations.    TOTAL SERVICE TIME: 65 minutes  EVALUATION OF VOICE AND RESONANCE  (13065)  NO CHARGE FACILITY FEE (38074)      Mariela You, Ph.D., [facility]SLP  Speech-Language Pathologist  Director, [facility]  [phone]

## 2021-08-06 NOTE — LETTER
8/6/2021       RE: Doug Nieves  [address]  [facility] [address]     Dear Colleague,    Thank you for referring your patient, Doug Nieves, to the [facility] VOICE CLINIC [place] at [facility]. Please see a copy of my visit note below.    Doug Nieves is a 75 year old male who is being evaluated via a billable video visit.      Doug has been notified and verbally consented to the following:     This video visit will be conducted between you and your provider.    Patient has opted to conduct today's video visit vs an in-person appointment.     Video visits are billed at different rates depending on your insurance coverage. Please reach out to your insurance provider with any questions.     If during the course of the call the provider feels the appointment is not appropriate, you will not be charged for this service.  Provider has received verbal consent for billable virtual visit from the patient? Yes  Will anyone else be joining your video visit? This is a joint visit with Dr. Polanco, who is seeing the patient in the clinic    Call initiated at: 1:05   Type of Visit Platform Used: [employer] Video  Location of provider: Home  Location of patient: [facility] VOICE [facility]  Yariel Vera Jr., M.D., F.A.C.S.  Becki Kraft M.D., M.P.H.  Martina Polanco M.D.  Mariela You, Ph.D., CCC-SLP  Pranav Ashby, Ph.D., CCC-SLP  Juli Avitia M.M. (voice), M.A., CCC-SLP  Trevor Jacobsen M.M. (voice), M.A., CCC-SLP  TODD Bose (voice), M.S., CCC-SLP    [facility]  VOICE/SPEECH/BREATHING EVALUATION AND LARYNGEAL EXAMINATION REPORT    Patient: Doug Nieves  Date of Visit: 8/6/2021    Clinician: Mariela You, Ph.D., CCC/SLP  Seen in conjunction with: Dr. Martina Polanco  Referring Physician: PCP Dr. Matthew    CHIEF COMPLAINT:  Throat-clearing    HISTORY  Doug Nieves is a 75 year old presenting today for  evaluation of throat-clearing.    Please refer to Dr. Polanco s dictation for a more complete history and impressions.   Salient details of his history are as follows:    Onset: 5-8 years, gradual onset, no idea of precipitating factors, though worse in winter months    Talked to PCP; tried allergy medication no help    Has tried antihistamines, expectorants, and Vicks OTC throat moisturizer    Worsening; now 24/7; getting more annoying    Realizes it's not URI    Throat-clear, not a cough    Nonproductive    Not painful    Unaware of triggers; no tickle    Thinks he clears less when he is physically active    Throat clear is dry; feels nasal congestion but knows nothing would come out if he blows his nose    He does a daily regimen of gargling and saline nasal irrigation; he thinks they help, but he still feels dry    He hydrates well systemically    He uses Vicks Vapo-Rub cough drops    He has been on omeprazole for about 3-5 years; at some point there was a GI work-up with chest pain/heartburn as the presenting symptom    No hoarseness from throat-clearing    Throat clear does not seem related to voice use    Voice is fine    No swallowing problems    No breathing problems; able to exercise vigorously    Recent new blood pressure medication; no effect on throat-clearing    This is first ENT visit    DIAGNOSIS/REASON FOR REFERRAL  Throat-clearing/ Evaluate, perform laryngeal exam, treat as appropriate    CURRENT SYMPTOMS INCLUDE:  VOICE    No problem  THROAT CLEARING    Frequent, dry, annoying    No triggers  SWALLOWING    No problem  BREATHING    No problem    OTHER PERTINENT HISTORY    Generally unremarkable; please see Dr. Polanco's note    OBJECTIVE FINDINGS  VOICE/ SPEECH/ NON-COMMUNICATIVE LARYNGEAL BEHAVIORS EVALUATION  An evaluation of the voice, breathing, and throat-clear was accomplished today; salient features are as follows:    Palpation of the laryngeal area shows:    No tenderness of the thyrohyoid area      Palpation does not trigger desire to throat clear    Reduced thyrohyoid space     Cough/ Throat clear:    Variable; can be frequent, but can also talk for 5 minutes without throat-clear    Dry    Locus of cough/ throat clear: sounds consistent with upper airway    Mild to moderate in severity    Throat clearing increases considerably after the laryngeal exam    Breathing pattern:    Appears within normal limits and adequate     Mid-thoracic muscle use pattern    No overt tension is evident.    Voice quality is characterized by    Clear, robust, WNL    No roughness, breathiness, strain, or transient vocal disturbances    Habitual pitch is WNL in the range from A2 to D3, with gooduse of pitch inflection    Loudness is WNL and appropriate for the setting    Sustained vowels:     Comfortable pitch /a/: B2 - robust and clear    Comfortable pitch /i/: F#3 - clear and normal    In a pitch glide task to determine pitch range, he glides easily up to F4 in falsett rester    LARYNGEAL EXAMINATION    Endoscopic laryngeal examination was performed by:  Dr. Polanco  I provided the protocol of instructions for the patient.  Type of exam:   Flexible endoscopy with chip-tip technology    This exam shows:  Laryngeal and Vocal Fold Mucosa    Essentially healthy laryngeal mucosa    Presence of secretions is unremarkable    No particular signs of reflux    Status of vocal fold mucosa:   o Within normal limits, with no lesions and straight vibratory margins    Neurological and Functional Integrity of the Larynx    Vocal folds are mobile and meet at midline; movement is brisk and symmetric; exam is neurologically normal     Normal function is evident during a task of 20 quickly repeated vowels    Elongation of the vocal folds for pitch increase is within normal    On phonation, glottic closure is normal    Supraglottic Function and Therapy Probes    Supraglottic function during speech and other phonatory tasks is unremarkable; any  constriction is well within normal limits    Stroboscopy was not warranted.    Dr. Polanco and I reviewed this laryngeal exam with Mr. Nieves today, and I provided pertinent explanations:    Endoscopic findings are consistent with audio-perceptual assessment and patient Hx/complaints.    his symptoms are accounted for by probable mucosal irritation that cannot be observed, but is likely given his history    Because chronic throat clearing and irritable larynx respond well to functional therapy, we recommend this as a first intervention    ASSESSMENT / PLAN  IMPRESSIONS:  This evaluation has resulted in the following diagnosis/diagnoses for Mr. Nieves  Chronic Throat Clearing (R68.89)  Dysphonia (R49.0)    Laryngeal evaluation demonstrated healthy mucosa and within normal limits laryngeal function    It is possible there is some mild muscular hyperfunction that is also a laryngeal irritant    Perceptual evaluation demonstrated frequent throat clearing  RECOMMENDATIONS:     A course of speech therapy is recommended to help reduce chronic cough and mucosal irritation.    He demonstrates a Good prognosis for improvement given adherence to therapeutic recommendations. Therapeutic     Positive indicators: commitment to process; diagnosis is known to respond to functional treatment;     Negative indicators: none    TREATMENT PLAN  Speech therapy    DURATION/FREQUENCY OF TREATMENT  Three weekly or bi-weekly one-hour sessions, with two monthly one-hour follow-up sessions    GOALS  Patient goal:   To reduce his throat clearing to acceptable levels    Short-term goal(s): Within the first2 sessions, Mr. Nieves:  1.  will demonstrate improved awareness of throat clearing: acknowledging >75% of all throat clearing events during session time with no clinician support  2.  will be able to demonstrate provided throat-clear suppression and substitution strategies from memory independently with 90% accuracy    Long-term goal(s): In 4  months, Mr. Nieves will:  1.  Report a week with no more than two episodes of coughing or throat-clearing per day, that do not last more than two seconds    This treatment plan was developed with the patient who agreed with the recommendations.    TOTAL SERVICE TIME: 65 minutes  EVALUATION OF VOICE AND RESONANCE (38341)  NO CHARGE FACILITY FEE (74874)      Mariela You, Ph.D., [facility]-SLP  Speech-Language Pathologist  Director, [facility]  [phone]

## 2021-08-06 NOTE — NURSING NOTE
"Chief Complaint   Patient presents with     Consult     [facility] new hybrid       Pulse 83, temperature 99.1  F (37.3  C), temperature source Temporal, height 2.007 m (6' 7\"), weight 131.5 kg (290 lb), SpO2 97 %.    Mohan Higgins, EMT  "

## 2021-08-11 ENCOUNTER — DOCUMENTATION ONLY (OUTPATIENT)
Dept: OTOLARYNGOLOGY | Facility: CLINIC | Age: 76
End: 2021-08-11

## 2021-08-11 NOTE — PROGRESS NOTES
note and HERVE note(incomplete) [facility], fax number [phone], 19 pgs faxed.

## 2021-08-17 ENCOUNTER — OFFICE VISIT (OUTPATIENT)
Dept: OTOLARYNGOLOGY | Facility: CLINIC | Age: 76
End: 2021-08-17
Payer: COMMERCIAL

## 2021-08-17 DIAGNOSIS — R09.89 CHRONIC THROAT CLEARING: ICD-10-CM

## 2021-08-17 DIAGNOSIS — R49.0 DYSPHONIA: Primary | ICD-10-CM

## 2021-08-17 PROCEDURE — 92507 TX SP LANG VOICE COMM INDIV: CPT | Mod: GN | Performed by: SPEECH-LANGUAGE PATHOLOGIST

## 2021-08-17 NOTE — LETTER
"8/17/2021       RE: Doug Nieves  [address]  [place] [address]     Dear Colleague,    Thank you for referring your patient, Doug Nieves, to the [facility] VOICE CLINIC [place] [facility]. Please see a copy of my visit note below.    THERAPY NOTE (CPT 61387)  Date of Service: 8/17/2021  Mr. 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      SUBJECTIVE / OBJECTIVE:  Since his initial evaluation, Mr. Nieves reports that he has had no change in symptoms which is expected.    THERAPEUTIC ACTIVITIES  Counseling and Education    Asked many questions about the nature of his symptoms, and I answered all of these thoroughly.    Chronic cough / throat clearing reduction therapy    Increased awareness of precursor sensations    Describes a sensation of phlegm sticking in his throat, pointing above the thyroid notch.    Reports that he is often unaware of the fact that he has even started clearing his throat.    After approximately 30 minutes, patient began to self identify instances of leaving very soft throat clearing    Identified that speaking seems to reduce the urge to throat clear while speaking, but that urge to throat clear immediately following speaking is very strong    Suppression and substitution strategies were instructed including    Swallowing substitution techniques    Hard to swallow (like swallowing up a golf ball)    Sips of very cold ice water    Breathing suppression techniques to reduce laryngeal tension    Focus on low abdominal expansion during inhalation, followed by exhalation on a gentle, and prolonged \"shhhhhh\"    Low impact glottic coup and soft cough    The remainder of today's session focused on implementing the above strategies.    Spontaneous conversation and reading " sentences of increasing length was particularly useful for allowing the patient to identify when he experienced the urge to throat clear, and to successfully implement cough suppression strategies.    She was able to suppress throat clearing and coughing for up to 7-8 minutes at a time, a marked improvement from his baseline    Additionally he was instructed to keep a log of what circumstances are eliciting cough / throat clear            Concepts of an optimal regimen for practice were instructed.  o He should use an interval schedule of practice, with brief periods of practice frequently throughout each day  o [employer] concepts of volitional practice to facilitate motor learning.    ASSESSMENT/PLAN  PROGRESS TOWARD LONG TERM GOALS:   Adequate but incomplete progress; please see above    IMPRESSIONS: Chronic cough and dysphonia. Mr. Nieves demonstrates excellent initial learning of pharyngeal awareness and cough suppression strategies.  Ongoing skilled intervention is required in order to meet his long-term goals of completing a week of normal activities while having control of his throat clearing greater than 90% of the time.    PLAN: I will see Mr. Nieves in 2 weeks, at which time we will work toward his plan of care.    Pranav Ashby, Ph.D., [facility]3]-SLP  Speech-Language Pathologist-[facility]  [phone]  he/him/his

## 2021-08-17 NOTE — PATIENT INSTRUCTIONS
"Home Practice:    Chronic cough / throat clearing reduction therapy    Suppression and substitution strategies were instructed including    When you feel the Phlegm feeling that makes you want to clear your throat:    Swallow hard like swallowing a golf ball    Take a sip of very hot or very cold liquid    Breathe Gently in through your nose and out on a \"shhhhhhhh\" like an angry     Gentle humming on \"mmmmmm\" was also somewhat helpful today.     Repeat as necessary until the urge to clear your throat goes away.    If you've tried all of the strategies for several minutes and still fee the need to clear your throat, give one gentle, good throat clear, rather than letting several small ones \"build up\"    Your goals this week are to:  1) Increase your awareness of every time you clear your throat, and try to identify triggers  2) For 30 minutes, 2x/day, practice throat clearing suppression using the strategies above.    "

## 2021-08-17 NOTE — PROGRESS NOTES
"THERAPY NOTE (CPT 66061)  Date of Service: 8/17/2021  Mr. 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      SUBJECTIVE / OBJECTIVE:  Since his initial evaluation, Mr. Nieves reports that he has had no change in symptoms which is expected.    THERAPEUTIC ACTIVITIES  Counseling and Education    Asked many questions about the nature of his symptoms, and I answered all of these thoroughly.    Chronic cough / throat clearing reduction therapy    Increased awareness of precursor sensations    Describes a sensation of phlegm sticking in his throat, pointing above the thyroid notch.    Reports that he is often unaware of the fact that he has even started clearing his throat.    After approximately 30 minutes, patient began to self identify instances of leaving very soft throat clearing    Identified that speaking seems to reduce the urge to throat clear while speaking, but that urge to throat clear immediately following speaking is very strong    Suppression and substitution strategies were instructed including    Swallowing substitution techniques    Hard to swallow (like swallowing up a golf ball)    Sips of very cold ice water    Breathing suppression techniques to reduce laryngeal tension    Focus on low abdominal expansion during inhalation, followed by exhalation on a gentle, and prolonged \"shhhhhh\"    Low impact glottic coup and soft cough    The remainder of today's session focused on implementing the above strategies.    Spontaneous conversation and reading sentences of increasing length was particularly useful for allowing the patient to identify when he experienced the urge to throat clear, and to successfully implement cough suppression strategies.    She was able to suppress throat clearing and coughing for up to 7-8 minutes at a time, a marked improvement from his " baseline    Additionally he was instructed to keep a log of what circumstances are eliciting cough / throat clear            Concepts of an optimal regimen for practice were instructed.  o He should use an interval schedule of practice, with brief periods of practice frequently throughout each day  o [employer] concepts of volitional practice to facilitate motor learning.    ASSESSMENT/PLAN  PROGRESS TOWARD LONG TERM GOALS:   Adequate but incomplete progress; please see above    IMPRESSIONS: Chronic cough and dysphonia. Mr. Nieves demonstrates excellent initial learning of pharyngeal awareness and cough suppression strategies.  Ongoing skilled intervention is required in order to meet his long-term goals of completing a week of normal activities while having control of his throat clearing greater than 90% of the time.    PLAN: I will see Mr. Nieves in 2 weeks, at which time we will work toward his plan of care.    Pranav Ashby, Ph.D., [facility]-SLP  Speech-Language Pathologist-[facility] Voice [facility]  [phone]  he/him/his

## 2021-09-07 ENCOUNTER — OFFICE VISIT (OUTPATIENT)
Dept: OTOLARYNGOLOGY | Facility: CLINIC | Age: 76
End: 2021-09-07
Payer: COMMERCIAL

## 2021-09-07 DIAGNOSIS — R49.0 DYSPHONIA: Primary | ICD-10-CM

## 2021-09-07 DIAGNOSIS — R09.89 CHRONIC THROAT CLEARING: ICD-10-CM

## 2021-09-07 PROCEDURE — 92507 TX SP LANG VOICE COMM INDIV: CPT | Mod: GN | Performed by: SPEECH-LANGUAGE PATHOLOGIST

## 2021-09-07 NOTE — PROGRESS NOTES
"THERAPY NOTE (CPT 01819)  Date of Service: 9/7/2021  Mr. Nieves was seent today, 9/7/2021, for speech therapy session number 2 for treatment of chronic cough and chronic throat clearing related to irritable larynx syndrome.      SUBJECTIVE / OBJECTIVE:  Since our most-recent session, Mr. Nieves reports that he has been practicing as prescribed. He has found that during biking, he was very successful at suppressing the throat clearing and coughing during these times.     He is feeling slightly more nasally congested over the past few days with a change in the weather.     THERAPEUTIC ACTIVITIES  Counseling and Education    Asked many questions about the nature of his symptoms, and I answered all of these thoroughly.    Chronic cough / throat clearing reduction therapy  ? Increased awareness of precursor sensations  ? We identified that a common motor pattern for the patient is to very lightly but very frequently clear his throat at the end of his conversation will turn during a conversational partner speaking turn.  The patient was very surprised when this was brought to his attention.  He was able to substantially increase his awareness, such that he required no cues in realizing that he had clear hysterectomies particular context  ? The patient was subsequently able to eliminate throat clearing in this context approximately 50% of the time.      Reviewed suppression and substitution strategies    Swallowing substitution techniques    Hard to swallow (like swallowing up a golf ball)    Sips of very cold ice water    Breathing suppression techniques to reduce laryngeal tension    Focus on low abdominal expansion during inhalation, followed by exhalation on a gentle, and prolonged \"shhhhhh\"    The patient was able to independently recall and perform each of the use with no cues    We will soft glottic coup, as this was seemingly too similar to his chronic throat clearing, described above.  ? The remainder of today's " session focused on implementing the above strategies.    Spontaneous conversation and reading sentences of increasing length was particularly useful for allowing the patient to identify when he experienced the urge to throat clear, and to successfully implement cough suppression strategies.    She was able to suppress throat clearing and coughing for up to 15 minutes at a time, a marked improvement from his baseline      I provided a handouts of today's therapeutic activities to facilitate practice.    ASSESSMENT/PLAN  PROGRESS TOWARD LONG TERM GOALS:   Adequate progress; please see above    IMPRESSIONS: Chronic Cough (R05) and Chronic Throat Clearing (R68.89) in the context of Irritable Larynx Syndrome (ILS) (J38.7). Mr. Nieves has demonstrated excellent progress at the initial skill acquisition regarding throat clearing and coughing suppression strategies.  He has demonstrated excellent retention of this new motor skill.  He has begun to demonstrate transference of the skeletal spontaneous activities.  Ongoing skilled intervention is required in order to advance him to increasing levels of independence and to meet his long-term goals.    PLAN: I will see Mr. Nieves in 2 weeks, at which time we will continue to work toward the plan of care.     Pranav Ashby, Ph.D., [facility]3]-SLP  Speech-Language Pathologist-[facility]  [phone]  he/him/his    Speech recognition software may have been used in the above documentation; input is reviewed before signature to the best of my ability.

## 2021-09-07 NOTE — LETTER
9/7/2021       RE: Doug Nieves  [address]  [place] [address]     Dear Colleague,    Thank you for referring your patient, Doug Nieves, to the [facility] VOICE CLINIC [place] [facility]. Please see a copy of my visit note below.    THERAPY NOTE (CPT 64517)  Date of Service: 9/7/2021  Mr. Nieves was seent today, 9/7/2021, for speech therapy session number 2 for treatment of chronic cough and chronic throat clearing related to irritable larynx syndrome.      SUBJECTIVE / OBJECTIVE:  Since our most-recent session, Mr. Nieves reports that he has been practicing as prescribed. He has found that during biking, he was very successful at suppressing the throat clearing and coughing during these times.     He is feeling slightly more nasally congested over the past few days with a change in the weather.     THERAPEUTIC ACTIVITIES  Counseling and Education    Asked many questions about the nature of his symptoms, and I answered all of these thoroughly.    Chronic cough / throat clearing reduction therapy  ? Increased awareness of precursor sensations  ? We identified that a common motor pattern for the patient is to very lightly but very frequently clear his throat at the end of his conversation will turn during a conversational partner speaking turn.  The patient was very surprised when this was brought to his attention.  He was able to substantially increase his awareness, such that he required no cues in realizing that he had clear hysterectomies particular context  ? The patient was subsequently able to eliminate throat clearing in this context approximately 50% of the time.      Reviewed suppression and substitution strategies    Swallowing substitution techniques    Hard to swallow (like swallowing up a golf ball)    Sips of very cold ice water    Breathing suppression techniques to reduce laryngeal tension    Focus on low  "abdominal expansion during inhalation, followed by exhalation on a gentle, and prolonged \"shhhhhh\"    The patient was able to independently recall and perform each of the use with no cues    We will soft glottic coup, as this was seemingly too similar to his chronic throat clearing, described above.  ? The remainder of today's session focused on implementing the above strategies.    Spontaneous conversation and reading sentences of increasing length was particularly useful for allowing the patient to identify when he experienced the urge to throat clear, and to successfully implement cough suppression strategies.    She was able to suppress throat clearing and coughing for up to 15 minutes at a time, a marked improvement from his baseline      I provided a handouts of today's therapeutic activities to facilitate practice.    ASSESSMENT/PLAN  PROGRESS TOWARD LONG TERM GOALS:   Adequate progress; please see above    IMPRESSIONS: Chronic Cough (R05) and Chronic Throat Clearing (R68.89) in the context of Irritable Larynx Syndrome (ILS) (J38.7). Mr. Nieves has demonstrated excellent progress at the initial skill acquisition regarding throat clearing and coughing suppression strategies.  He has demonstrated excellent retention of this new motor skill.  He has begun to demonstrate transference of the skeletal spontaneous activities.  Ongoing skilled intervention is required in order to advance him to increasing levels of independence and to meet his long-term goals.    PLAN: I will see Mr. Nieves in 2 weeks, at which time we will continue to work toward the plan of care.     Pranav Ashby, Ph.D., [facility]-SLP  Speech-Language Pathologist-[facility]  [phone]  he/him/his    Speech recognition software may have been used in the above documentation; input is reviewed before signature to the best of my ability.     Again, thank you for allowing me to participate in the care of your patient.      Sincerely,    Pranav" AWILDA Ashby, SLP

## 2021-09-12 ENCOUNTER — HEALTH MAINTENANCE LETTER (OUTPATIENT)
Age: 76
End: 2021-09-12

## 2021-09-21 ENCOUNTER — OFFICE VISIT (OUTPATIENT)
Dept: OTOLARYNGOLOGY | Facility: CLINIC | Age: 76
End: 2021-09-21
Payer: COMMERCIAL

## 2021-09-21 DIAGNOSIS — R09.89 CHRONIC THROAT CLEARING: Primary | ICD-10-CM

## 2021-09-21 DIAGNOSIS — R49.0 DYSPHONIA: ICD-10-CM

## 2021-09-21 PROCEDURE — 92507 TX SP LANG VOICE COMM INDIV: CPT | Mod: GN | Performed by: SPEECH-LANGUAGE PATHOLOGIST

## 2021-09-21 NOTE — LETTER
9/21/2021       RE: Doug Nieves  [address]  [place] [address]     Dear Colleague,    Thank you for referring your patient, Doug Nieves, to the [facility] VOICE CLINIC [place] at [facility]. Please see a copy of my visit note below.    THERAPY NOTE (CPT 98902)  Date of Service: 9/21/2021  Mr. Nieves was seent today, 9/21/2021, for speech therapy session number 3 for treatment of chronic cough and throat clearing related to laryngeal hypersensitivity.      SUBJECTIVE / OBJECTIVE:  Since our most-recent session, Mr. Nieves reports that he had a period of increased nasal congestion over the past weekend which may have been related to seasonal allergies.  In the setting he had an increase in throat clearing, but otherwise has noticed a reduction in the overall frequency and magnitude of his throat clearing.  His wife has observed this reduction as well.  Please see cough severity index outcomes and flowsheets for additional details.    THERAPEUTIC ACTIVITIES  Counseling and Education    Asked many questions about the nature of his symptoms, and I answered all of these thoroughly.    Coordination of respiration and phonation    The patient had observed that he often tended to clear his throat at the end of a particularly long sentence    He was provided with sentences of increasing phrase length, which increased his awareness of increased laryngeal tension in response to decreased lung volume.    Conscious increase in frequency of breaths and reduction of breath group size reduced vocal roughness, and subsequently reduced post conversational turn throat clearing.    Throat clearing suppression strategies    The patient was able to independently recall hard swallows and sips of very hot and very cold liquid as cough suppression strategy.  He reported that sips of very hot or very cold liquid, and humming did not seem to be as  "effective.    He was introduced to concepts of slow nasal inhalation followed by gentle exhalation on \"SH\".  He found this to be extremely effective at suppressing throat clear.    The patient worked to implement the strategies during complex tasks, including walking laps around the clinic while discussing sports activities, and activities that was previously challenging for him in terms of frequency of throat clearing.  He was able to complete this activity for 10 minutes, with no throat clears when provided with intermittent clinician cueing    ASSESSMENT/PLAN  PROGRESS TOWARD LONG TERM GOALS:   Adequate progress; please see above    IMPRESSIONS: Irritable Larynx Syndrome (ILS) (J38.7), Chronic Cough (R05) and Chronic Throat Clearing (R68.89). Mr. Nieves presents today with good retention of previously learned cough suppression strategies, however when his awareness of throat clearing decreases, frequency of throat clearing returns to baseline levels.  When provided with periodic cueing, throat clearing/coughing is essentially eliminated.  Ongoing skilled intervention is required in order to advance patient toward increasing levels of independence.    PLAN: I will see Mr. Nieves in 2 weeks, at which time we will continue to work toward the plan of care, with a particular focus on application of throat clearing suppression strategies to more complex activities.     Pranav Ashby, Ph.D., [facility] [place]-SLP  Speech-Language Pathologist-[facility]  [phone]  he/him/his    Speech recognition software may have been used in the above documentation; input is reviewed before signature to the best of my ability.         Again, thank you for allowing me to participate in the care of your patient.      Sincerely,    Pranav Ashby, SLP      "

## 2021-09-21 NOTE — PROGRESS NOTES
"THERAPY NOTE (CPT 94776)  Date of Service: 9/21/2021  Mr. Nieves was seent today, 9/21/2021, for speech therapy session number 3 for treatment of chronic cough and throat clearing related to laryngeal hypersensitivity.      SUBJECTIVE / OBJECTIVE:  Since our most-recent session, Mr. Nieves reports that he had a period of increased nasal congestion over the past weekend which may have been related to seasonal allergies.  In the setting he had an increase in throat clearing, but otherwise has noticed a reduction in the overall frequency and magnitude of his throat clearing.  His wife has observed this reduction as well.  Please see cough severity index outcomes and flowsheets for additional details.    THERAPEUTIC ACTIVITIES  Counseling and Education    Asked many questions about the nature of his symptoms, and I answered all of these thoroughly.    Coordination of respiration and phonation    The patient had observed that he often tended to clear his throat at the end of a particularly long sentence    He was provided with sentences of increasing phrase length, which increased his awareness of increased laryngeal tension in response to decreased lung volume.    Conscious increase in frequency of breaths and reduction of breath group size reduced vocal roughness, and subsequently reduced post conversational turn throat clearing.    Throat clearing suppression strategies    The patient was able to independently recall hard swallows and sips of very hot and very cold liquid as cough suppression strategy.  He reported that sips of very hot or very cold liquid, and humming did not seem to be as effective.    He was introduced to concepts of slow nasal inhalation followed by gentle exhalation on \"SH\".  He found this to be extremely effective at suppressing throat clear.    The patient worked to implement the strategies during complex tasks, including walking laps around the clinic while discussing sports activities, and " activities that was previously challenging for him in terms of frequency of throat clearing.  He was able to complete this activity for 10 minutes, with no throat clears when provided with intermittent clinician cueing    ASSESSMENT/PLAN  PROGRESS TOWARD LONG TERM GOALS:   Adequate progress; please see above    IMPRESSIONS: Irritable Larynx Syndrome (ILS) (J38.7), Chronic Cough (R05) and Chronic Throat Clearing (R68.89). Mr. Nieves presents today with good retention of previously learned cough suppression strategies, however when his awareness of throat clearing decreases, frequency of throat clearing returns to baseline levels.  When provided with periodic cueing, throat clearing/coughing is essentially eliminated.  Ongoing skilled intervention is required in order to advance patient toward increasing levels of independence.    PLAN: I will see Mr. Nieves in 2 weeks, at which time we will continue to work toward the plan of care, with a particular focus on application of throat clearing suppression strategies to more complex activities.     Pranav Ashby, Ph.D., [facility]-SLP  Speech-Language Pathologist-[facility]  [phone]  he/him/his    Speech recognition software may have been used in the above documentation; input is reviewed before signature to the best of my ability.

## 2021-10-07 ENCOUNTER — PRE VISIT (OUTPATIENT)
Dept: NEPHROLOGY | Facility: CLINIC | Age: 76
End: 2021-10-07

## 2021-10-26 ENCOUNTER — OFFICE VISIT (OUTPATIENT)
Dept: OTOLARYNGOLOGY | Facility: CLINIC | Age: 76
End: 2021-10-26
Payer: COMMERCIAL

## 2021-10-26 DIAGNOSIS — R49.0 DYSPHONIA: Primary | ICD-10-CM

## 2021-10-26 DIAGNOSIS — R05.3 CHRONIC COUGH: ICD-10-CM

## 2021-10-26 DIAGNOSIS — R09.89 CHRONIC THROAT CLEARING: ICD-10-CM

## 2021-10-26 PROCEDURE — 92507 TX SP LANG VOICE COMM INDIV: CPT | Mod: GN | Performed by: SPEECH-LANGUAGE PATHOLOGIST

## 2021-10-26 NOTE — PROGRESS NOTES
"THERAPY NOTE (CPT 28270)  Date of Service: 10/26/2021  Mr. Nieves was seent today, 10/26/2021, for speech therapy session number 4 for treatment of chronic cough.      SUBJECTIVE / OBJECTIVE:  Since our most-recent session, Mr. Nieves reports that he has had slightly worsened allergy symptoms, but he reduced frequency and severity of coughing.  He states that he has had no instances in which he felt he was unable to control his cough since his most recent therapy session.  He states that he feels he is in control of his cough 100% of the time.  He does continue to have small, intermittent coughing periodically throughout the day, but this does not bother him, and does not bother people around him.  The subjective report is also reflected in the patient's reduction to a cough severity index score of 4 from 8 in the most recent session.    THERAPEUTIC ACTIVITIES  Counseling and Education    Asked many questions about the nature of his symptoms, and I answered all of these thoroughly.    Cough suppression strategies    The patient was able to independently implement cough suppression strategies of whispered \"EH EH EH\", hard swallows, gentle humming, and sips of liquid in today's session, resulting in only 1 instance of overt cough, which was in fact productive of sputum associated with recent increase in allergy symptoms.    No other instances of coughing were observed in today's session, consistent with patient reports of reduced cough frequency at home  Coordination of respiration and phonation    The patient was able to independently implement increased coordination of respiration and phonation during conversational speech; this resulted in resolution of previously observed strain and roughness, and likely contributed to reduce frequency of coughing and throat clearing.  Education regarding topical hydration    The patient was provided with instructions for improving topical hydration, particularly during this " period of time while he is experiencing increased allergy symptoms.  Strategies included nasal saline sprays frequently throughout the day, and before bed and gargling with salt water.    ASSESSMENT/PLAN  PROGRESS TOWARD LONG TERM GOALS:   Good progress; please see report above for objective measures    IMPRESSIONS: Chronic Cough (R05.3) and Chronic Throat Clearing (R68.89). Mr. Nieves has demonstrated excellent implementation of cough suppression strategies and excellent implementation of strategies for improving coordination of respiration and phonation, and thus improving voice quality.  He has had a significant reduction in frequency and severity of coughing and throat clearing symptoms, and this is reflected in cough severity index scores that are within normal limits.  The patient is discharged from therapy at this time.    PLAN: The patient is discharged from speech therapy at this time as he has met his long-term and short-term goals.  He will return as needed for additional therapy and/for repeat evaluation.     Pranav Ashby, Ph.D., [facility]-SLP  Speech-Language Pathologist-[facility]  [phone]  he/him/his    Speech recognition software may have been used in the above documentation; input is reviewed before signature to the best of my ability.

## 2021-10-26 NOTE — LETTER
"10/26/2021       RE: Doug Nieves  [address]  [place] [address]     Dear Colleague,    Thank you for referring your patient, Doug Nieves, to the [facility] VOICE CLINIC [place] at [facility]. Please see a copy of my visit note below.    THERAPY NOTE (CPT 05442)  Date of Service: 10/26/2021  Mr. Nieves was seent today, 10/26/2021, for speech therapy session number 4 for treatment of chronic cough.      SUBJECTIVE / OBJECTIVE:  Since our most-recent session, Mr. Nieves reports that he has had slightly worsened allergy symptoms, but he reduced frequency and severity of coughing.  He states that he has had no instances in which he felt he was unable to control his cough since his most recent therapy session.  He states that he feels he is in control of his cough 100% of the time.  He does continue to have small, intermittent coughing periodically throughout the day, but this does not bother him, and does not bother people around him.  The subjective report is also reflected in the patient's reduction to a cough severity index score of 4 from 8 in the most recent session.    THERAPEUTIC ACTIVITIES  Counseling and Education    Asked many questions about the nature of his symptoms, and I answered all of these thoroughly.    Cough suppression strategies    The patient was able to independently implement cough suppression strategies of whispered \"EH EH EH\", hard swallows, gentle humming, and sips of liquid in today's session, resulting in only 1 instance of overt cough, which was in fact productive of sputum associated with recent increase in allergy symptoms.    No other instances of coughing were observed in today's session, consistent with patient reports of reduced cough frequency at home  Coordination of respiration and phonation    The patient was able to independently implement increased coordination of respiration and phonation " during conversational speech; this resulted in resolution of previously observed strain and roughness, and likely contributed to reduce frequency of coughing and throat clearing.  Education regarding topical hydration    The patient was provided with instructions for improving topical hydration, particularly during this period of time while he is experiencing increased allergy symptoms.  Strategies included nasal saline sprays frequently throughout the day, and before bed and gargling with salt water.    ASSESSMENT/PLAN  PROGRESS TOWARD LONG TERM GOALS:   Good progress; please see report above for objective measures    IMPRESSIONS: Chronic Cough (R05.3) and Chronic Throat Clearing (R68.89). Mr. Nieves has demonstrated excellent implementation of cough suppression strategies and excellent implementation of strategies for improving coordination of respiration and phonation, and thus improving voice quality.  He has had a significant reduction in frequency and severity of coughing and throat clearing symptoms, and this is reflected in cough severity index scores that are within normal limits.  The patient is discharged from therapy at this time.    PLAN: The patient is discharged from speech therapy at this time as he has met his long-term and short-term goals.  He will return as needed for additional therapy and/for repeat evaluation.     Pranav Ashby, Ph.D., [facility]-SLP  Speech-Language Pathologist-[facility]  [phone]  he/him/his    Speech recognition software may have been used in the above documentation; input is reviewed before signature to the best of my ability.         Again, thank you for allowing me to participate in the care of your patient.      Sincerely,    Pranav Ashby, SLP

## 2021-10-29 DIAGNOSIS — R80.9 ALBUMINURIA: ICD-10-CM

## 2021-10-29 RX ORDER — LOSARTAN POTASSIUM 50 MG/1
50 TABLET ORAL AT BEDTIME
Qty: 90 TABLET | Refills: 1 | Status: SHIPPED | OUTPATIENT
Start: 2021-10-29 | End: 2021-11-08 | Stop reason: ALTCHOICE

## 2021-10-29 NOTE — TELEPHONE ENCOUNTER
"Request for medication refill:losartan (COZAAR) 50 MG tablet    Providers if patient needs an appointment and you are willing to give a one month supply please refill for one month and  send a letter/MyChart using \".SMILLIMITEDREFILL\" .smillimited and route chart to \"[facility] \" (Giving one month refill in non controlled medications is strongly recommended before denial)    If refill has been denied, meaning absolutely no refills without visit, please complete the smart phrase \".smirxrefuse\" and route it to the \"P [facility] MED REFILLS\"  pool to inform the patient and the pharmacy.    Jannet Rizo        "

## 2021-11-02 ENCOUNTER — LAB (OUTPATIENT)
Dept: LAB | Facility: CLINIC | Age: 76
End: 2021-11-02
Payer: COMMERCIAL

## 2021-11-02 DIAGNOSIS — N18.2 CHRONIC KIDNEY DISEASE, STAGE II (MILD): ICD-10-CM

## 2021-11-02 DIAGNOSIS — G62.9 PERIPHERAL POLYNEUROPATHY: ICD-10-CM

## 2021-11-02 DIAGNOSIS — E53.8 VITAMIN B12 DEFICIENCY (NON ANEMIC): ICD-10-CM

## 2021-11-02 DIAGNOSIS — E55.9 VITAMIN D DEFICIENCY, UNSPECIFIED: ICD-10-CM

## 2021-11-02 LAB
FOLATE SERPL-MCNC: 6.9 NG/ML
HBA1C MFR BLD: 5.9 % (ref 0–5.6)
TOTAL PROTEIN SERUM FOR ELP: 7 G/DL (ref 6.8–8.8)
TSH SERPL DL<=0.005 MIU/L-ACNC: 1.43 MU/L (ref 0.4–4)

## 2021-11-02 PROCEDURE — 82306 VITAMIN D 25 HYDROXY: CPT | Mod: 90 | Performed by: PATHOLOGY

## 2021-11-02 PROCEDURE — 80069 RENAL FUNCTION PANEL: CPT | Performed by: PATHOLOGY

## 2021-11-02 PROCEDURE — 86038 ANTINUCLEAR ANTIBODIES: CPT | Mod: 90 | Performed by: PATHOLOGY

## 2021-11-02 PROCEDURE — 83921 ORGANIC ACID SINGLE QUANT: CPT | Mod: 90 | Performed by: PATHOLOGY

## 2021-11-02 PROCEDURE — 83970 ASSAY OF PARATHORMONE: CPT | Mod: 90 | Performed by: PATHOLOGY

## 2021-11-02 PROCEDURE — 84155 ASSAY OF PROTEIN SERUM: CPT | Mod: 90 | Performed by: PATHOLOGY

## 2021-11-02 PROCEDURE — 82607 VITAMIN B-12: CPT | Mod: 90 | Performed by: PATHOLOGY

## 2021-11-02 PROCEDURE — 83090 ASSAY OF HOMOCYSTEINE: CPT | Mod: 90 | Performed by: PATHOLOGY

## 2021-11-02 PROCEDURE — 83036 HEMOGLOBIN GLYCOSYLATED A1C: CPT | Performed by: PATHOLOGY

## 2021-11-02 PROCEDURE — 85027 COMPLETE CBC AUTOMATED: CPT | Performed by: PATHOLOGY

## 2021-11-02 PROCEDURE — 84165 PROTEIN E-PHORESIS SERUM: CPT | Mod: 26 | Performed by: PATHOLOGY

## 2021-11-02 PROCEDURE — 99000 SPECIMEN HANDLING OFFICE-LAB: CPT | Performed by: PATHOLOGY

## 2021-11-02 PROCEDURE — 84443 ASSAY THYROID STIM HORMONE: CPT | Performed by: PATHOLOGY

## 2021-11-02 PROCEDURE — 82746 ASSAY OF FOLIC ACID SERUM: CPT | Mod: 90 | Performed by: PATHOLOGY

## 2021-11-02 PROCEDURE — 36415 COLL VENOUS BLD VENIPUNCTURE: CPT | Performed by: PATHOLOGY

## 2021-11-03 DIAGNOSIS — N18.2 CHRONIC KIDNEY DISEASE, STAGE II (MILD): Primary | ICD-10-CM

## 2021-11-03 DIAGNOSIS — E53.8 VITAMIN B12 DEFICIENCY (NON ANEMIC): Primary | ICD-10-CM

## 2021-11-03 DIAGNOSIS — E55.9 VITAMIN D DEFICIENCY, UNSPECIFIED: ICD-10-CM

## 2021-11-03 LAB
ALBUMIN SERPL ELPH-MCNC: 4 G/DL (ref 3.7–5.1)
ALBUMIN SERPL-MCNC: 3.9 G/DL (ref 3.4–5)
ALPHA1 GLOB SERPL ELPH-MCNC: 0.4 G/DL (ref 0.2–0.4)
ALPHA2 GLOB SERPL ELPH-MCNC: 1 G/DL (ref 0.5–0.9)
ANA SER QL IF: NEGATIVE
ANION GAP SERPL CALCULATED.3IONS-SCNC: 6 MMOL/L (ref 3–14)
B-GLOBULIN SERPL ELPH-MCNC: 0.7 G/DL (ref 0.6–1)
BUN SERPL-MCNC: 9 MG/DL (ref 7–30)
CALCIUM SERPL-MCNC: 8.9 MG/DL (ref 8.5–10.1)
CHLORIDE BLD-SCNC: 104 MMOL/L (ref 94–109)
CO2 SERPL-SCNC: 26 MMOL/L (ref 20–32)
CREAT SERPL-MCNC: 0.81 MG/DL (ref 0.66–1.25)
ERYTHROCYTE [DISTWIDTH] IN BLOOD BY AUTOMATED COUNT: 14.1 % (ref 10–15)
GAMMA GLOB SERPL ELPH-MCNC: 0.9 G/DL (ref 0.7–1.6)
GFR SERPL CREATININE-BSD FRML MDRD: 88 ML/MIN/1.73M2
GLUCOSE BLD-MCNC: 119 MG/DL (ref 70–99)
HCT VFR BLD AUTO: 47.3 % (ref 40–53)
HCYS SERPL-SCNC: 12.8 UMOL/L (ref 4–12)
HGB BLD-MCNC: 15.6 G/DL (ref 13.3–17.7)
M PROTEIN SERPL ELPH-MCNC: 0 G/DL
MCH RBC QN AUTO: 28.5 PG (ref 26.5–33)
MCHC RBC AUTO-ENTMCNC: 33 G/DL (ref 31.5–36.5)
MCV RBC AUTO: 87 FL (ref 78–100)
PHOSPHATE SERPL-MCNC: 3.1 MG/DL (ref 2.5–4.5)
PLATELET # BLD AUTO: 202 10E3/UL (ref 150–450)
POTASSIUM BLD-SCNC: 3.8 MMOL/L (ref 3.4–5.3)
PROT PATTERN SERPL ELPH-IMP: ABNORMAL
PTH-INTACT SERPL-MCNC: 30 PG/ML (ref 18–80)
RBC # BLD AUTO: 5.47 10E6/UL (ref 4.4–5.9)
SODIUM SERPL-SCNC: 136 MMOL/L (ref 133–144)
VIT B12 SERPL-MCNC: 321 PG/ML (ref 193–986)
WBC # BLD AUTO: 5.6 10E3/UL (ref 4–11)

## 2021-11-04 LAB — DEPRECATED CALCIDIOL+CALCIFEROL SERPL-MC: 34 UG/L (ref 20–75)

## 2021-11-07 ENCOUNTER — HEALTH MAINTENANCE LETTER (OUTPATIENT)
Age: 76
End: 2021-11-07

## 2021-11-08 ENCOUNTER — OFFICE VISIT (OUTPATIENT)
Dept: NEPHROLOGY | Facility: CLINIC | Age: 76
End: 2021-11-08
Attending: FAMILY MEDICINE
Payer: COMMERCIAL

## 2021-11-08 VITALS
SYSTOLIC BLOOD PRESSURE: 150 MMHG | OXYGEN SATURATION: 98 % | DIASTOLIC BLOOD PRESSURE: 85 MMHG | WEIGHT: 284.7 LBS | BODY MASS INDEX: 32.07 KG/M2 | HEART RATE: 70 BPM

## 2021-11-08 DIAGNOSIS — I10 HYPERTENSION, ESSENTIAL: Primary | ICD-10-CM

## 2021-11-08 PROCEDURE — 99204 OFFICE O/P NEW MOD 45 MIN: CPT | Performed by: INTERNAL MEDICINE

## 2021-11-08 RX ORDER — LOSARTAN POTASSIUM AND HYDROCHLOROTHIAZIDE 12.5; 1 MG/1; MG/1
1 TABLET ORAL DAILY
Qty: 90 TABLET | Refills: 3 | Status: SHIPPED | OUTPATIENT
Start: 2021-11-08 | End: 2022-06-03 | Stop reason: DRUGHIGH

## 2021-11-08 NOTE — LETTER
11/8/2021       RE: Doug Nieves  [address]  [place] [address]     Dear Colleague,    Thank you for referring your patient, Doug Nieves, to the [facility] NEPHROLOGY CLINIC [place] at [facility]. Please see a copy of my visit note below.      Nephrology Initial Consult  November 8, 2021      Doug Nieves MRN:[id number] YOB: 1945  Date of Admission:(Not on file)  Primary care provider: Liliam Weinberg  Requesting physician: Johnna Ferreira, *      REASON FOR CONSULT: Albuminuria       HISTORY OF PRESENT ILLNESS:    Mr Nieves has been found to have albuminuria and hence referred to the nephrology clinic. He has always maintained a creatinine wnl and has not had any hematuria.He has been diagnosed with HTN in 2013 and reports that it has been well controlled on his current med regimen. More recently he has been diagnosed with DM x ~ 2016 not on medications, BS controlled by lifestyle modification. He does report of checking blood pressures at home systolics of 113-130 mm of Hg, diastolics 70-85 mm of Hg. 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 He has been diagnosed with chronic rhinitis but has no history of sinusitis.   Patient denies any LE edema, exertional dyspnea/orthopnea/PND, dizziness, lightheadedness, nausea, vomiting or diarrhea.   Denies use of OTC NSAIDS or other non prescribed meds, recent exposure to abx or contrast, obstructive urinary symptoms, skin rashes, joint pains, fevers, passing kidney stones, recurrent sinus infections or UTI's       PAST MEDICAL HISTORY:  Reviewed with patient on 11/08/2021   As per HPI    MEDICATIONS:  Reviewed with the patient in detail    ALLERGIES:    Reviewed with the patient in detail    REVIEW OF SYSTEMS:  A comprehensive of systems was negative except as  noted above.    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 UA without hematuria.He maintains creatinine wnl. SPEP was done which was negative for monoclonal process. He had an US done 2015 and had normal appearing kidneys. He checks his blood pressures at home and most of his readings are < 140/90 mm of Hg however his clinic blood pressures are higher which is likely due to white coat syndrome. He remains on amlodipine 5 mg daily, Losartan 50 mg daily and hydrochlorothiazide 12.5 mg daily. No signs of hypervolemia. He had  TTE done in 2010 which showed inferior wall hypokinesis and left ventricular dilation along with some hypertrophy.    Other metabolic parameters for CKD are wnl.     I will discontinue his amlodipine and switch him to Losartan-hydrochlorothiazide 100-12.5 mg daily combination pill.   He will continue to check his blood pressures at home and let us know if most of his readings are > 140/90 mm of Hg.     Will not need nephrology follow up at this time. Will be happy to see him in the clinic if other concerns arise. He will continue to follow up with his PCP.    Johnna Ferreira MD

## 2021-11-08 NOTE — PATIENT INSTRUCTIONS
1. Stop taking amlodipine, hydrochlorothiazide and Losartan  2. Start taking the combination pill with losartan-hydrochlorothiazide 100-12.5 mg daily  3. Continue to check your blood pressures at home and notify us if they are more than 140/90 mm of Hg

## 2021-11-08 NOTE — PROGRESS NOTES
Nephrology Initial Consult  November 8, 2021      Doug Nieves MRN:[id number] YOB: 1945  Date of Admission:(Not on file)  Primary care provider: Liliam Weinberg  Requesting physician: Johnna Ferreira, *      REASON FOR CONSULT: Albuminuria       HISTORY OF PRESENT ILLNESS:    Mr Nieves has been found to have albuminuria and hence referred to the nephrology clinic. He has always maintained a creatinine wnl and has not had any hematuria.He has been diagnosed with HTN in 2013 and reports that it has been well controlled on his current med regimen. More recently he has been diagnosed with DM x ~ 2016 not on medications, BS controlled by lifestyle modification. He does report of checking blood pressures at home systolics of 113-130 mm of Hg, diastolics 70-85 mm of Hg. 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 He has been diagnosed with chronic rhinitis but has no history of sinusitis.   Patient denies any LE edema, exertional dyspnea/orthopnea/PND, dizziness, lightheadedness, nausea, vomiting or diarrhea.   Denies use of OTC NSAIDS or other non prescribed meds, recent exposure to abx or contrast, obstructive urinary symptoms, skin rashes, joint pains, fevers, passing kidney stones, recurrent sinus infections or UTI's       PAST MEDICAL HISTORY:  Reviewed with patient on 11/08/2021   As per HPI    MEDICATIONS:  Reviewed with the patient in detail    ALLERGIES:    Reviewed with the patient in detail    REVIEW OF SYSTEMS:  A comprehensive of systems was negative except as noted above.    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 UA without hematuria.He maintains creatinine wnl. SPEP was done which was negative for monoclonal process. He had an US done 2015 and had normal appearing kidneys. He checks his blood pressures at home and most of his readings are < 140/90 mm of Hg however his clinic blood pressures are higher which is likely due to white coat syndrome. He remains on amlodipine 5 mg daily, Losartan 50 mg daily and hydrochlorothiazide 12.5 mg daily. No signs of hypervolemia. He had  TTE done in 2010 which showed inferior wall hypokinesis and left ventricular dilation along with some hypertrophy.    Other metabolic parameters for CKD are wnl.     I will discontinue his amlodipine and switch him to Losartan-hydrochlorothiazide 100-12.5 mg daily combination pill.   He will continue to check his blood pressures at home and let us know if most of his readings are > 140/90 mm of Hg.     Will not need nephrology follow up at this time. Will be happy to see him in the clinic if other concerns arise. He will continue to follow up with his PCP.    Johnna Ferreira MD

## 2021-11-09 LAB — METHYLMALONATE SERPL-SCNC: 0.2 UMOL/L (ref 0–0.4)

## 2021-11-17 DIAGNOSIS — E53.8 VITAMIN B12 DEFICIENCY (NON ANEMIC): ICD-10-CM

## 2021-11-17 RX ORDER — LANOLIN ALCOHOL/MO/W.PET/CERES
2000 CREAM (GRAM) TOPICAL DAILY
Qty: 90 TABLET | Refills: 1 | Status: SHIPPED | OUTPATIENT
Start: 2021-11-17 | End: 2022-05-19

## 2021-11-17 NOTE — TELEPHONE ENCOUNTER
"Request for medication refill:cyanocobalamin (VITAMIN B-12) 1000 MCG tablet    Providers if patient needs an appointment and you are willing to give a one month supply please refill for one month and  send a letter/MyChart using \".SMILLIMITEDREFILL\" .smillimited and route chart to \"P [facility] \" (Giving one month refill in non controlled medications is strongly recommended before denial)    If refill has been denied, meaning absolutely no refills without visit, please complete the smart phrase \".smirxrefuse\" and route it to the \"P [facility] MED REFILLS\"  pool to inform the patient and the pharmacy.    Jannet Rizo        "

## 2021-12-03 ENCOUNTER — OFFICE VISIT (OUTPATIENT)
Dept: FAMILY MEDICINE | Facility: CLINIC | Age: 76
End: 2021-12-03
Payer: COMMERCIAL

## 2021-12-03 VITALS
TEMPERATURE: 98.7 F | SYSTOLIC BLOOD PRESSURE: 161 MMHG | RESPIRATION RATE: 16 BRPM | OXYGEN SATURATION: 97 % | DIASTOLIC BLOOD PRESSURE: 87 MMHG | WEIGHT: 283 LBS | HEART RATE: 63 BPM | BODY MASS INDEX: 31.88 KG/M2

## 2021-12-03 DIAGNOSIS — G62.9 PERIPHERAL POLYNEUROPATHY: ICD-10-CM

## 2021-12-03 DIAGNOSIS — Z00.00 WELLNESS EXAMINATION: Primary | ICD-10-CM

## 2021-12-03 DIAGNOSIS — I10 ESSENTIAL HYPERTENSION: ICD-10-CM

## 2021-12-03 PROCEDURE — 99213 OFFICE O/P EST LOW 20 MIN: CPT | Mod: 25 | Performed by: FAMILY MEDICINE

## 2021-12-03 PROCEDURE — 99397 PER PM REEVAL EST PAT 65+ YR: CPT | Performed by: FAMILY MEDICINE

## 2021-12-03 NOTE — PROGRESS NOTES
>67 year old Wellness Visit ( Medicare etc)         HPI       This 76 year old male presents as an established patient  Liliam Weinberg who presents for a  Annual Wellness Exam.    Other issues patient wants to address today:    yes, just review.     HTN:  Is on the higher dose now of the Losartan/HCTZ - BPs in 116- 132/57-74  Stopped ASA  Nephro diagnosed HTN induced CKD    Taking the B12 for his neuropathy. Is still doing accupuncture which helps. B12 may as well.  No pain, just no sensation. Gabapentin - for pain.     Intentional slow weight loss  Wt Readings from Last 4 Encounters:   12/03/21 128.4 kg (283 lb)   11/08/21 129.1 kg (284 lb 11.2 oz)   08/06/21 131.5 kg (290 lb)   05/21/21 130.7 kg (288 lb 3.2 oz)     Had recent right ankle inversion and doing better post PT.       Visual Acuity: :  Testing not done, no vision changes.     Patient Active Problem List   Diagnosis     Lumbar radiculopathy     Chronic kidney disease, stage II (mild)     Diverticulosis of large intestine     Sebaceous cyst     Esophageal reflux     Essential hypertension     Open angle with borderline findings, low risk     Benign neoplasm of colon     Rosacea     Other seborrheic keratosis     Shoulder joint pain     Dermatofibroma of lower extremity     Hyperlipidemia LDL goal <100     Prediabetes     BCC (basal cell carcinoma), face       Past Medical History:   Diagnosis Date     Gastro-oesophageal reflux disease      Hypertension      Quadriceps tendon rupture 3/16/2013        Family History   Problem Relation Age of Onset     Diabetes Mother      Alzheimer Disease Mother      Diabetes Brother      Diabetes Brother      Diabetes Brother      Diabetes Brother      Genitourinary Problems Father         CKD secondary to being quad         Problem List, Family History and past Medical History reviewed and unchanged/updated.       Health risk Assessment/ Review of Systems:       Constitutional:   Fevers or night sweats?  NO      Eyes:    Vision problems?  NO            Hearing:  Do you feel you have hearing loss?  NO  Cardiovascular:  Chest pain, palpitations, or pain with walking?  NO        Respiratory:   Breathing problems or cough?  NO    :   Difficulty controlling urination?  NO        Musculoskeletal:   Stiffness or sore joints?  NO            Skin:   concerning lesions or moles?  NO           Nervous System:   loss of strength or sensation,  numbness or tingling,  tremor,  dizziness,  headache?  NO         Mental Health:   depression or anxiety,  sleep problems?  NO   Cognition:  Memory problems?  NO       Weight Loss: Have you lost 10 or more pounds unintentionally in the previous year?  NO  Energy: How much of the time during the past 3 weeks did you feel tired?   (check one of the following):   ?  All of the time  ? Most of the time  ? Some of the time  ? A little of the time  ? None of the Time    PHQ-2 Score:   PHQ-2 ( 1999 Pfizer) 12/3/2021 8/6/2021   Q1: Little interest or pleasure in doing things 0 0   Q2: Feeling down, depressed or hopeless 0 0   PHQ-2 Score 0 0   PHQ-2 Total Score (12-17 Years)- Positive if 3 or more points; Administer PHQ-A if positive - 0       PHQ-9 Score:   No flowsheet data found.  Medical Care:     What other specialists or organizations are involved in your medical care?  Dermatology, optomotrist.   Patient Care Team       Relationship Specialty Notifications Start End    Liliam Weinberg MD PCP - General Family Practice  9/29/11     Referred Patient to Nephrology for Albuminuria; referred to ENT    Phone: [phone] Fax: [phone]         [address] [address]    Deirdre Dietz MD MD Colon and Rectal Surgery  6/11/15     Jorge Taylor MD MD Orthopedics  12/11/15     Phone: [phone] Fax: [phone]         [address] [facility] [address]    Johnna Ferreira MD MD Nephrology  5/24/21     Phone: [phone] Fax: [phone]  [facility] [address]    Martina Polanco MD MD Otolaryngology  21     Phone: [phone] Fax: [phone]         [address] [facility] [address]    Liliam Weinberg MD Assigned PCP   21     Phone: [phone] Fax: [phone]         [address] [address]    Martina Polanco MD Assigned Surgical Provider   8/15/21     Phone: [phone] Fax: [phone]         [address] [facility] [address]    Johnna Ferreira MD Assigned Nephrology Provider   21     Phone: [phone] Fax: [phone]         [address] [facility] [address]          Do you have an advanced directive? Yes      Social History / Home Safety     Social History     Tobacco Use     Smoking status: Former Smoker     Quit date: 3/18/1980     Years since quittin.7     Smokeless tobacco: Never Used     Tobacco comment: Quit many years ago   Substance Use Topics     Alcohol use: Yes     Comment: occasional      Marital Status:  Who lives in your household? spouse  Does your home have any of the following safety concerns? Loose rugs in the hallway, no grab bars in the bathroom, no handrails on the stairs or have poorly lit areas?  No   Do you feel threatened or controlled by a partner, ex-partner or anyone in your life? {Yes No   Has anyone hurt you physically, for example by pushing, hitting, slapping or kicking you   or forcing you to have sex? No       Functional Status     Do you need help with dressing yourself, bathing, or walking?No   Do you need help with the phone, transportation, shopping, preparing meals, housework, laundry, medications or managing money?No   By yourself and not using aids, do you have any difficulty walking up 10 steps  without resting? No   By yourself and not using aids, do you have any difficulty walking several hundred yards? No              Risk Behaviors and Healthy Habits     History   Smoking Status     Former Smoker     Quit date:  3/18/1980   Smokeless Tobacco     Never Used     Comment: Quit many years ago     How many servings of fruits and vegetables do you eat a day? 2-3  Exercise:walking, cycling, stretching exercises  4-5 days/week for an average of 45-60 minutes  Do you frequently drive without a seatbelt? No   History   Smoking Status     Former Smoker     Quit date: 3/18/1980   Smokeless Tobacco     Never Used     Comment: Quit many years ago     Do you use any other drugs? No       Do you use alcohol?Yes  Number of drinks per day 2-3  Number of drinking days a week 2-3      Functional Ability   Was the patient's timed Up & Go test (Get up from chair walk, 10 feet turn, return to chair and sit down) unsteady or longer than 10 seconds? No     Fall risk:     1. Have you fallen two or more times in the past year? No   2. Have you fallen and had an injury in the past year?  No         Evaulation of Cognitive Function     Family member/caregiver input: none    1) Repeat 3 items (Leader, Season, Table)    2) Clock draw: NORMAL  3) 3 item recall: Recalls 2 objects   Results: NORMAL clock, 1-2 items recalled: COGNITIVE IMPAIRMENT LESS LIKELY    Mini-CogTM Copyright S Anthony. Licensed by the author for use in [facility]; reprinted with permission (odette@[facility]). All rights reserved.          Other Assessments:     CV Risk based on Pooled Cohort Risk (consider assessing every 4-6 years; consider statin in patients with 10-yr risk > 7.5%):   The ASCVD Risk score ([place] DC Jr., et al., 2013) failed to calculate for the following reasons:    The valid total cholesterol range is 130 to 320 mg/dL    Advance Directives: Discussed with patient and family as appropriate.  Has patient completed advance directives? Yes, advance care planning is on file.        Immunization History   Administered Date(s) Administered     COVID-19,PF,Pfizer (12+ Yrs) 03/02/2021, 03/23/2021, 10/01/2021     FLU 6-35 months 11/30/2012     Flu 65+ Years 10/22/2018      Flu, Unspecified 08/28/2020     Influenza (High Dose) 3 valent vaccine 10/16/2015, 10/14/2016, 10/03/2017, 10/22/2018     Influenza (IIV3) PF 11/30/2012, 10/28/2013     Influenza Quad, Recombinant, pf(RIV4) (Flublok) 10/08/2019     Influenza, Quad, High Dose, Pf, 65yr+ (Fluzone HD) 08/28/2020     Pneumo Conj 13-V (2010&after) 12/08/2016     Pneumococcal 23 valent 11/30/2012     Td (Adult), Adsorbed 03/05/2009     Tdap (Adacel,Boostrix) 05/03/2021     Zoster vaccine recombinant adjuvanted (SHINGRIX) 02/08/2019, 05/17/2019     Zoster vaccine, live 11/30/2012     Reviewed Immunization Record Today    Physical Exam     Vitals: BP (!) 161/87   Pulse 63   Temp 98.7  F (37.1  C) (Oral)   Resp 16   Wt 128.4 kg (283 lb)   SpO2 97%   BMI 31.88 kg/m    BMI= Body mass index is 31.88 kg/m .  GENERAL APPEARANCE: alert and no distress  HENT: ear canals patent and TM's normal; mouth without ulcers or lesions; and oral mucous membranes moist  Hearing loss screen:   Normal   DENTITION:  Good repair?  yes  RESP: lungs clear to auscultation - no rales, rhonchi or wheezes  CV: regular rates and rhythm, no murmur, click or rub, no peripheral edema and peripheral pulses strong  SKIN: no suspicious lesions or rashes  NEURO: Normal strength and tone  MENTAL STATUS EXAM  Appearance: appropriate  Attitude: cooperative  Behavior: normal  Eye Contact: normal  Speech: normal  Orientation: oreinted to person , place, time and situation  Mood:  normal  Affect: Mood Congruent  Thought Process: clear        Assessment and Plan       Welcome to Medicare Preventive Visit / Initial Medicare Annual Wellness Exam - reviewed Preventive Services and Plan form with patient as specified in Patient Instructions.    Doug was seen today for physical.    Diagnoses and all orders for this visit:    Wellness examination - doing well. Up to date. No new testing indicated today. Keeping weight down and HgA1c near normal.     Essential hypertension -  controlled per his home BPs. Better on the higher doses.  Continue them. Agree with no ASA    Peripheral polyneuropathy - stable. No need for gabapentin at this time.          Options for treatment and follow-up care were reviewed with the Doug Nieves and/or guardian engaged in the decision making process and verbalized understanding of the options discussed and agreed with the final plan.    Liliam Weinberg MD

## 2021-12-03 NOTE — PATIENT INSTRUCTIONS
Scheduling:  If you had an XRay/CT/Ultrasound/MRI ordered the number is [phone] to schedule or change your radiology appointment.   [facility] - SERVICES  For Men   Review these tests with your doctor then decide which ones you want and take this page home for your reference     Immunization History   Administered Date(s) Administered     COVID-19,PF,Pfizer (12+ Yrs) 03/02/2021, 03/23/2021, 10/01/2021     FLU 6-35 months 11/30/2012     Flu 65+ Years 10/22/2018     Flu, Unspecified 08/28/2020     Influenza (High Dose) 3 valent vaccine 10/16/2015, 10/14/2016, 10/03/2017, 10/22/2018     Influenza (IIV3) PF 11/30/2012, 10/28/2013     Influenza Quad, Recombinant, pf(RIV4) (Flublok) 10/08/2019     Influenza, Quad, High Dose, Pf, 65yr+ (Fluzone HD) 08/28/2020     Pneumo Conj 13-V (2010&after) 12/08/2016     Pneumococcal 23 valent 11/30/2012     Td (Adult), Adsorbed 03/05/2009     Tdap (Adacel,Boostrix) 05/03/2021     Zoster vaccine recombinant adjuvanted (SHINGRIX) 02/08/2019, 05/17/2019     Zoster vaccine, live 11/30/2012                                                       IMMUNIZATIONS Description Recommend today?     Influenza (flu shot) Helps to prevent flu; you should get it every year No; is up to date.   PCV 13 Prevents pneumonia; given once No; is up to date.   PPSV 23 Prevents pneumonia; given once No; is up to date.   Tetanus Prevents tetanus; need every 10 years No; is up to date.   Herpes Zoster (shingles) Prevents or lessens symptoms from shingles; given once.  No; is up to date.  If you want this vaccine please go to a pharmacy to receive Shinrix   Hepatitis B  If you have any of the following risk factors you should be immunized for hepatitis B: severe kidney disease, people who live in the same house as a carrier of Hepatitis B virus, people who live in  institutions (e.g. nursing homes or group homes), homosexual men, patients with hemophilia who received  Factor VIII or IX concentrates, abusers of illicit injectable drugs No: is not indicated today.     Health Maintenance   Topic Date Due     ADVANCE CARE PLANNING  10/16/2020     MEDICARE ANNUAL WELLNESS VISIT  08/28/2021     LIPID  08/28/2021     FALL RISK ASSESSMENT  08/28/2021     MICROALBUMIN  05/21/2022     BMP  11/02/2022     COLORECTAL CANCER SCREENING  01/08/2026     DTAP/TDAP/TD IMMUNIZATION (2 - Td or Tdap) 05/03/2031     HEPATITIS C SCREENING  Completed     PHQ-2  Completed     INFLUENZA VACCINE  Completed     Pneumococcal Vaccine: 65+ Years  Completed     URINALYSIS  Completed     ZOSTER IMMUNIZATION  Completed     AORTIC ANEURYSM SCREENING (SYSTEM ASSIGNED)  Completed     COVID-19 Vaccine  Completed     IPV IMMUNIZATION  Aged Out     MENINGITIS IMMUNIZATION  Aged Out     HEPATITIS B IMMUNIZATION  Aged Out         SCREENING TESTS     Description   Year of Last Screening   Recommended Today?   Heart disease screening blood tests    Cholesterol level Reducing cholesterol can reduce your risk of heart attacks by 25%.  Screening is recommended yearly if you are at risk of heart disease otherwise every 4-5 years  No; is up to date.     Diabetes screening tests    Hemoglobin A1c blood test   Finding and treating diabetes early can reduce complications.  Screening recommended/covered yearly if you have high blood pressure, high cholesterol, obesity (BMI >30), or a history of high blood glucose tests; or 2 of the following: family history of diabetes, overweight (BMI >25 but <30), age 65 years or older, and a history of diabetes of pregnancy or gave birth to baby weighing more than 9 lbs.    No; is up to date.   Hepatitis B screening Finding hepatitis B early can reduce complications.  Screening is recommended for persons with selected risk factors.  No: is not indicated today.   Hepatitis C screening Finding hepatitis C early can reduce complications.  Screening is recommended for all persons born from 1945  through 1965 and for those with selected other risk factors.   No; is up to date.   HIV screening Finding HIV early can reduce complications.  Screening is recommended for persons with risk factors for HIV infection.  No: is not indicated today.   Glaucoma screening Early detection of glaucoma can prevent blindness.   Please talk to your eye doctor about this.       SCREENING TESTS     Description   Year of Last Screening   Recommended Today?   Colorectal cancer screening    Fecal occult blood test     Screening colonoscopy Screening for colon cancer has been shown to reduce death from colon cancer by 25-30%. Screening recommended to start at 50 years and continuing until age 75 years.    No: is not indicated today.   Screening for Lung Cancer     Low-dose CT scanning Screening can reduce mortality in persons aged 55-80 who have smoked at least 30 pack-years and who are either still smoking or have quit in the past 15 years.  No: is not indicated today.   Abdominal Aortic Aneurysm (AAA) screening    Ultrasound (US)   An aneurysm treated before rupture is very safe -a ruptured aneurysm can be fatal.  Screening  by US for AAA is limited to patients who meet one of the following criteria:    Men who are 65-75 years old and have smoked more than 100 cigarettes in their lifetime    Anyone with a family history of abdominal aortic aneurysm  No; is up to date.     MEDICARE WELLNESS EXAM PATIENT INSTRUCTIONS    Yearly exam:     See your health care provider every year in order to review changes in your health, review medicines that you take, and discuss preventive care needs such as immunizations and cancer screening.    Get a flu shot each year.     Advance Directive:    If you have not done so, you are encouraged to complete an advance directive. If you would like support with this, please contact the clinic  through the main clinic line. More information about advance directives can be found at:      [email or web address]    Nutrition:     Eat at least 5 servings of fruits and vegetables each day.     Eat whole-grain bread, whole-wheat pasta and brown rice instead of white grains and rice.     Talk to your doctor about Calcium and Vitamin D.     Lifestyle:    Exercise for at least 150 minutes a week (30 minutes a day, 5 days a week). This will help you control your weight and prevent disease.     Limit alcohol to one drink per day.     If you smoke, try to quit - your doctor will be happy to help.     Wear sunscreen to prevent skin cancer.     See your dentist every six months for an exam and cleaning.     See your eye doctor every 1 to 2 years to screen for conditions such as glaucoma, macular degeneration and cataracts.      Patient Education   Here is the plan from today's visit    1. Wellness examination  All is up to date!!      Please call or return to clinic if your symptoms don't go away.    Follow up plan  No follow-ups on file.    Thank you for coming to [facility] today.  Lab Testing:  **If you had lab testing today and your results are reassuring or normal they will be mailed to you or sent through [facility] within 7 days.   **If the lab tests need quick action we will call you with the results.  **If you are having labs done on a different day, please call [phone] to schedule at [facility] or [phone] for other [facility] locations. Labs do not offer walk-in appointments.  The phone number we will call with results is # [phone] (home) . If this is not the best number please call our clinic and change the number.  Medication Refills:  If you need any refills please call your pharmacy and they will contact us.   If you need to  your refill at a new pharmacy, please contact the new pharmacy directly. The new pharmacy will help you get your medications transferred faster.   Scheduling:  If you have any concerns about  today's visit or wish to schedule another appointment please call our office during normal business hours [phone] (8-5:00 M-F)  If a referral was made to a [facility] and you don't get a call from central scheduling please call [phone].  If a Mammogram was ordered for you at [facility] call [phone] to schedule or change your appointment.  If you had an EKG/XRay/CT/Ultrasound/MRI ordered the number is [phone] to schedule or change your radiology appointment.   [facility] has limited ultrasound appointments available on Wednesdays, if you would like your ultrasound at [facility], please call [phone] to schedule.   Medical Concerns:  If you have urgent medical concerns please call [phone] at any time of the day.    Liliam Weinberg MD

## 2022-02-15 DIAGNOSIS — K21.9 GASTROESOPHAGEAL REFLUX DISEASE WITHOUT ESOPHAGITIS: ICD-10-CM

## 2022-02-15 NOTE — TELEPHONE ENCOUNTER
"Request for medication refill:  omeprazole (PRILOSEC) 20 MG DR capsule    Providers if patient needs an appointment and you are willing to give a one month supply please refill for one month and  send a letter/MyChart using \".SMILLIMITEDREFILL\" .smillimited and route chart to \"P [facility] \" (Giving one month refill in non controlled medications is strongly recommended before denial)    If refill has been denied, meaning absolutely no refills without visit, please complete the smart phrase \".smirxrefuse\" and route it to the \"P [facility] MED REFILLS\"  pool to inform the patient and the pharmacy.    Dorothy Walton MA        "

## 2022-04-19 DIAGNOSIS — I10 ESSENTIAL HYPERTENSION: ICD-10-CM

## 2022-04-19 RX ORDER — ATORVASTATIN CALCIUM 40 MG/1
40 TABLET, FILM COATED ORAL DAILY
Qty: 90 TABLET | Refills: 3 | Status: SHIPPED | OUTPATIENT
Start: 2022-04-19 | End: 2023-07-25

## 2022-04-19 NOTE — TELEPHONE ENCOUNTER
"Request for medication refill:  atorvastatin (LIPITOR) 40 MG tablet    Providers if patient needs an appointment and you are willing to give a one month supply please refill for one month and  send a letter/MyChart using \".SMILLIMITEDREFILL\" .smillimited and route chart to \"P [facility] \" (Giving one month refill in non controlled medications is strongly recommended before denial)    If refill has been denied, meaning absolutely no refills without visit, please complete the smart phrase \".smirxrefuse\" and route it to the \"P [facility] MED REFILLS\"  pool to inform the patient and the pharmacy.    Dorothy Walton MA        "

## 2022-05-02 ENCOUNTER — TELEPHONE (OUTPATIENT)
Dept: PHARMACY | Facility: CLINIC | Age: 77
End: 2022-05-02
Payer: COMMERCIAL

## 2022-05-02 DIAGNOSIS — I10 ESSENTIAL HYPERTENSION: Primary | ICD-10-CM

## 2022-05-02 NOTE — TELEPHONE ENCOUNTER
PHARMACY TELEPHONE ENCOUNTER:    Reason: home blood pressure follow up      Pt called to discuss home blood pressure values.  Pt has a home blood pressure cuff.  He occasionally checks his BPs.   He has been taking Losartan/HCTZ since Nov 2021.  Feels like his is tolerating his therapy and experiencing low values.      Plan:  Monitor blood pressure daily for 1 week before pharmacy appointment by phone.  Discuss home BP values,  Repeat encounter in Dec 2022.  Plan to visit with Dr. Weinberg in late May or early June.  Due for BMP at that time.    MTM referral created     Conrad Luna, Pharm.D.

## 2022-05-02 NOTE — PROGRESS NOTES
ITM referral for home blood pressure monitoring.      MTM referral placed    Patient called and agrees to schedule an apt.    Kelly Espinoza.D.

## 2022-05-17 ENCOUNTER — VIRTUAL VISIT (OUTPATIENT)
Dept: PHARMACY | Facility: CLINIC | Age: 77
End: 2022-05-17
Attending: FAMILY MEDICINE
Payer: COMMERCIAL

## 2022-05-17 DIAGNOSIS — E55.9 VITAMIN D DEFICIENCY: ICD-10-CM

## 2022-05-17 DIAGNOSIS — N18.2 CHRONIC KIDNEY DISEASE, STAGE II (MILD): ICD-10-CM

## 2022-05-17 DIAGNOSIS — E78.5 HYPERLIPIDEMIA LDL GOAL <100: ICD-10-CM

## 2022-05-17 DIAGNOSIS — K21.9 GASTROESOPHAGEAL REFLUX DISEASE WITHOUT ESOPHAGITIS: ICD-10-CM

## 2022-05-17 DIAGNOSIS — I10 ESSENTIAL HYPERTENSION: Primary | ICD-10-CM

## 2022-05-17 PROCEDURE — 99607 MTMS BY PHARM ADDL 15 MIN: CPT | Performed by: PHARMACIST

## 2022-05-17 PROCEDURE — 99605 MTMS BY PHARM NP 15 MIN: CPT | Performed by: PHARMACIST

## 2022-05-17 RX ORDER — FAMOTIDINE 20 MG/1
20 TABLET, FILM COATED ORAL 2 TIMES DAILY
Qty: 60 TABLET | Refills: 4 | Status: SHIPPED | OUTPATIENT
Start: 2022-05-17 | End: 2023-06-27

## 2022-05-17 NOTE — Clinical Note
Dr. Weinberg, We met with Doug today and will follow up regarding his home blood pressure readings. Thanks for the referral. Conrad Luna, Kelly.D.

## 2022-05-17 NOTE — LETTER
May 17, 2022  Doug Nieves  [address]   [place] [address]    Dear Mr. Nieves, [facility]        Thank you for talking with me on May 17, 2022 about your health and medications. As a follow-up to our conversation, I have included two documents:      1. Your Recommended To-Do List has steps you should take to get the best results from your medications.  2. Your Medication List will help you keep track of your medications and how to take them.    If you want to talk about these documents, please call Conrad Luna RPH at phone: [phone], Monday-Friday 8-4:30pm.    I look forward to working with you and your doctors to make sure your medications work well for you.    Sincerely,    Conrad Luna RPH  [facility] Pharmacist, [facility]

## 2022-05-17 NOTE — PROGRESS NOTES
Medication Therapy Management (MTM) Encounter    ASSESSMENT:                            Medication Adherence/Access: No issues identified    Essential hypertension  Above goal  Goal Less than 130/80 mmHg  No change today.  Patient is surprised that his blood pressure readings today were above goal.  He notes that these are usually lower during the week.  He would like to continue to monitor and may be try different times of the day.  Plan today will be to check blood pressure twice a day for 7 days.  Will evaluate the average blood pressure readings after the week and communicate via [facility] [employer] to see if changes should be made.  There is a potential to increase his hydrochlorothiazide, and we will consider this at a future encounter.    Hyperlipidemia LDL goal <100  Stable  No changes at this time.  Last cholesterol levels were 2020, at that time LDL was found to be 30.  Patient denies any adverse effects currently.  Recommend to draw a new cholesterol levels at future primary care provider encounter.    Gastroesophageal reflux disease without esophagitis  Controlled  Discussed the benefits of discontinuing proton pump inhibitor in favor of a histamine blocking agent.  There are some associations with PPIs and kidney dysfunction as well as poor calcium absorption.  As the patient has had a good experience with ranitidine in the past, he agrees to exchange his omeprazole for famotidine today.  I have prescribed famotidine twice daily to provide additional acid suppression.  There is a potential that the patient will have a rebound acid production after discontinuation of his PPI inhibitor.  Continue on twice daily famotidine, and after 30 days may consider decreasing this to once daily.        Vitamin D deficiency  Controlled on over-the-counter vitamin D.  May recommend future vitamin D levels    Chronic kidney disease, stage II (mild)  Stable   Patient currently treated with an ARB.  No changes recommended at this  time.        PLAN:                                  Begin taking famotidine 20 mg BID        1. Essential hypertension  7 day blood pressure log, check blood pressures twice daily    2. Gastroesophageal reflux disease without esophagitis     - famotidine (PEPCID) 20 MG tablet; Take 1 tablet (20 mg) by mouth 2 times daily  Dispense: 60 tablet; Refill: 4          Follow-up: In 1 week via [employer].    SUBJECTIVE/OBJECTIVE:                          Doug Nieves is a 76 year old male called for an initial visit. He was referred to me from Liliam Weinberg MD.      Reason for visit: HTN review.    Allergies/ADRs: Reviewed in chart  Past Medical History: Reviewed in chart  Tobacco: He reports that he quit smoking about 42 years ago. He has never used smokeless tobacco.  Alcohol: not discussed       Medication Adherence/Access: no issues reported    Hyperlipidemia :   Atorvastatin 40 mg once daily   Pt knows indication for the medication.  Atorvastatin is well tolerated.. Denies adverse effects.    Patient feels that his cholesterol has always been controlled, but his provider has recommended this therapy as a preventative measure.    Cholesterol   Date Value Ref Range Status   08/28/2020 93 <200 mg/dL Final   11/17/2017 104.7 0.0 - 200.0 mg/dL Final     HDL Cholesterol   Date Value Ref Range Status   08/28/2020 38 (L) >39 mg/dL Final   11/17/2017 35.0 (L) >40.0 mg/dL Final     LDL Cholesterol Calculated   Date Value Ref Range Status   08/28/2020 30 <100 mg/dL Final     Comment:     Desirable:       <100 mg/dl   11/17/2017 50 0 - 129 mg/dL Final     Triglycerides   Date Value Ref Range Status   08/28/2020 125 <150 mg/dL Final   11/17/2017 99.4 0.0 - 150.0 mg/dL Final     Cholesterol/HDL Ratio   Date Value Ref Range Status   11/17/2017 3.0 0.0 - 5.0 Final   05/29/2014 3.8 <5.0 RATIO Final       Hypertension: Current medications include Losartan-hydrochlorothiazide 100/12.5 .  Patient does self-monitor blood pressure. Home BP  monitoring (see below) Patient reports no current medication side effects.  Takes his medication in the evening.    BP Readings from Last 3 Encounters:   12/03/21 (!) 161/87   11/08/21 (!) 150/85   05/21/21 (!) 145/86     Home blood pressure   date BP1 BP2 BP3   05/17/22    155/84    59     5/13  5mp 124/86 69     5/11 136/76  60 126/91   67 (2:30PM)                   Sinus congestion :   Guaifenesin syrup taken once a day. As needed   Doug has an ongoing issue with sinus congestion.  Guaifenesin syrup has been helpful, but he continues to work to manage the symptoms.    GERD:   Occasional GERD sx. that has been treated with a PPI.  He has been taking this medication for the past 4 to 5 years.  He finds this very effective, but was using a different medication in the past that was also effective.  Currently takes the omeprazole daily.    Vit D:   Vit D tablet 100 mg OTC.      CKD:   Currently taking losartan/hydrochlorothiazide   GFR Estimate >60 mL/min/1.73m2 88       Lab Results   Component Value Date    UMALCR 448.07 (H) 05/03/2021        ----------------      I spent 45 minutes with this patient today. Dr. Liliam Weinberg MD  was provided the recommendations above  via routed note and  is the authorizing prescriber for this visit through the pharmacist collaborative practice agreement.. A copy of the visit note was provided to the patient's provider(s).    The patient was sent via [employer] a summary of these recommendations.     Conrad Luna, Pharm.D.          Telemedicine Visit Details  Type of service:  Telephone visit  Start Time: 10:00  End Time: 10:46 AM  Originating Location (patient location): Home  Distant Location (provider location):  [facility]     Medication Therapy Recommendations  Esophageal reflux    Current Medication: omeprazole (PRILOSEC) 20 MG DR capsule   Rationale: Unsafe medication for the patient - Adverse medication potential - Safety   Recommendation: Change  Medication - famotidine 20 MG tablet - worried about ckd   Status: Accepted per CPA         Essential hypertension    Current Medication: losartan-hydrochlorothiazide (HYZAAR) 100-12.5 MG tablet   Rationale: Medication requires monitoring - Needs additional monitoring   Recommendation: Self-Monitoring - HYTRIN PO - will monitor bp for 7 days at home   Status: Patient Agreed - Adherence/Education

## 2022-05-17 NOTE — LETTER
_  Medication List        Prepared on: 5/17/2022     Bring your Medication List when you go to the doctor, hospital, or   emergency room. And, share it with your family or caregivers.     Note any changes to how you take your medications.  Cross out medications when you no longer use them.    Medication How I take it Why I use it Prescriber   atorvastatin (LIPITOR) 40 MG tablet Take 1 tablet (40 mg) by mouth daily Essential Hypertension Liliam Weinberg MD   cyanocobalamin (VITAMIN B-12) 1000 MCG tablet Take 2 tablets (2,000 mcg) by mouth daily For two weeks and then drop to 1 tablet daily Vitamin B12 Deficiency (Non Anemic) Liliam Weinberg MD   famotidine (PEPCID) 20 MG tablet Take 1 tablet (20 mg) by mouth 2 times daily Gastroesophageal Reflux Disease without Esophagitis Castro Swenson MD   guaiFENesin (MUCINEX) syrup  Take 400 mg by mouth 2 times daily  Sinus problems Patient Reported   losartan-hydrochlorothiazide (HYZAAR) 100-12.5 MG tablet Take 1 tablet by mouth daily Hypertension, Essential Johnna Sixto Ferreira MD               Add new medications, over-the-counter drugs, herbals, vitamins, or  minerals in the blank rows below.    Medication How I take it Why I use it Prescriber                          Allergies:      No Known Allergies        Side effects I have had:               Other Information:              My notes and questions:

## 2022-05-17 NOTE — LETTER
"Recommended To-Do List      Prepared on: 5/17/2022     You can get the best results from your medications by completing the items on this \"To-Do List.\"      Bring your To-Do List when you go to your doctor. And, share it with your family or caregivers.    My To-Do List:  What we talked about: What I should do:   An issue with your medication    Change the medication you are taking from omeprazole (priLOSEC) to famotidine (PEPCID)          What we talked about: What I should do:   Needing additional monitoring    Self-monitor :  Check your home blood pressures using your electronic home blood pressure cuff twice a day for 7 days.           What we talked about: What I should do:                       "

## 2022-05-19 DIAGNOSIS — E53.8 VITAMIN B12 DEFICIENCY (NON ANEMIC): ICD-10-CM

## 2022-05-19 NOTE — TELEPHONE ENCOUNTER

## 2022-05-20 RX ORDER — LANOLIN ALCOHOL/MO/W.PET/CERES
1000 CREAM (GRAM) TOPICAL DAILY
Qty: 90 TABLET | Refills: 0 | Status: SHIPPED | OUTPATIENT
Start: 2022-05-20 | End: 2022-08-24

## 2022-06-01 ENCOUNTER — MYC MEDICAL ADVICE (OUTPATIENT)
Dept: PHARMACY | Facility: CLINIC | Age: 77
End: 2022-06-01
Payer: COMMERCIAL

## 2022-06-01 DIAGNOSIS — I10 HYPERTENSION, ESSENTIAL: ICD-10-CM

## 2022-06-03 RX ORDER — LOSARTAN POTASSIUM AND HYDROCHLOROTHIAZIDE 25; 100 MG/1; MG/1
1 TABLET ORAL DAILY
Qty: 90 TABLET | Refills: 3 | Status: SHIPPED | OUTPATIENT
Start: 2022-06-03 | End: 2023-06-06

## 2022-06-20 ENCOUNTER — MYC MEDICAL ADVICE (OUTPATIENT)
Dept: PHARMACY | Facility: CLINIC | Age: 77
End: 2022-06-20
Payer: COMMERCIAL

## 2022-06-21 ENCOUNTER — OFFICE VISIT (OUTPATIENT)
Dept: FAMILY MEDICINE | Facility: CLINIC | Age: 77
End: 2022-06-21
Payer: COMMERCIAL

## 2022-06-21 VITALS
WEIGHT: 281.4 LBS | OXYGEN SATURATION: 97 % | SYSTOLIC BLOOD PRESSURE: 133 MMHG | HEART RATE: 72 BPM | DIASTOLIC BLOOD PRESSURE: 71 MMHG | BODY MASS INDEX: 31.7 KG/M2 | TEMPERATURE: 98.9 F

## 2022-06-21 DIAGNOSIS — I10 ESSENTIAL HYPERTENSION: Primary | ICD-10-CM

## 2022-06-21 DIAGNOSIS — N18.2 CHRONIC KIDNEY DISEASE, STAGE II (MILD): ICD-10-CM

## 2022-06-21 DIAGNOSIS — Z13.220 SCREENING FOR HYPERLIPIDEMIA: ICD-10-CM

## 2022-06-21 DIAGNOSIS — R73.03 PREDIABETES: ICD-10-CM

## 2022-06-21 LAB
ALBUMIN MFR UR ELPH: 85.5 MG/DL
ANION GAP SERPL CALCULATED.3IONS-SCNC: 9 MMOL/L (ref 7–15)
BUN SERPL-MCNC: 10.7 MG/DL (ref 8–23)
CALCIUM SERPL-MCNC: 9.2 MG/DL (ref 8.8–10.2)
CHLORIDE SERPL-SCNC: 99 MMOL/L (ref 98–107)
CHOLEST SERPL-MCNC: 95 MG/DL
CREAT SERPL-MCNC: 0.86 MG/DL (ref 0.67–1.17)
CREAT UR-MCNC: 261 MG/DL
CREAT UR-MCNC: 261 MG/DL
DEPRECATED HCO3 PLAS-SCNC: 27 MMOL/L (ref 22–29)
GFR SERPL CREATININE-BSD FRML MDRD: >90 ML/MIN/1.73M2
GLUCOSE SERPL-MCNC: 88 MG/DL (ref 70–99)
HBA1C MFR BLD: 6 % (ref 0–5.6)
HDLC SERPL-MCNC: 34 MG/DL
LDLC SERPL CALC-MCNC: 28 MG/DL
MICROALBUMIN UR-MCNC: 431 MG/L
MICROALBUMIN/CREAT UR: 165.13 MG/G CR (ref 0–17)
NONHDLC SERPL-MCNC: 61 MG/DL
POTASSIUM SERPL-SCNC: 3.7 MMOL/L (ref 3.4–4.5)
PROT/CREAT 24H UR: 0.33 MG/MG CR (ref 0–0.2)
SODIUM SERPL-SCNC: 135 MMOL/L (ref 136–145)
TRIGL SERPL-MCNC: 165 MG/DL

## 2022-06-21 PROCEDURE — 80061 LIPID PANEL: CPT | Performed by: FAMILY MEDICINE

## 2022-06-21 PROCEDURE — 80048 BASIC METABOLIC PNL TOTAL CA: CPT | Performed by: FAMILY MEDICINE

## 2022-06-21 PROCEDURE — 99213 OFFICE O/P EST LOW 20 MIN: CPT | Performed by: FAMILY MEDICINE

## 2022-06-21 PROCEDURE — 82043 UR ALBUMIN QUANTITATIVE: CPT | Performed by: FAMILY MEDICINE

## 2022-06-21 PROCEDURE — 84156 ASSAY OF PROTEIN URINE: CPT | Performed by: FAMILY MEDICINE

## 2022-06-21 PROCEDURE — 36415 COLL VENOUS BLD VENIPUNCTURE: CPT | Performed by: FAMILY MEDICINE

## 2022-06-21 PROCEDURE — 83036 HEMOGLOBIN GLYCOSYLATED A1C: CPT | Performed by: FAMILY MEDICINE

## 2022-06-21 NOTE — PATIENT INSTRUCTIONS
Patient Education   Here is the plan from today's visit    1. Chronic kidney disease, stage II (mild)  Will see how your kidneys are doing. Protein check as well.   - Basic metabolic panel; Future  - Albumin Random Urine Quantitative with Creat Ratio; Future  - Protein  random urine; Future  - Basic metabolic panel  - Albumin Random Urine Quantitative with Creat Ratio  - Protein  random urine    2. Essential hypertension  So much better on the 25 mg daily.   - Basic metabolic panel; Future  - Basic metabolic panel    3. Prediabetes  At baseline - so well done!  - Hemoglobin A1c    4. Screening for hyperlipidemia  - Lipid panel reflex to direct LDL Fasting; Future  - Lipid panel reflex to direct LDL Fasting            Please call or return to clinic if your symptoms don't go away.    Follow up plan  No follow-ups on file.    Thank you for coming to [facility] today.  Lab Testing:  **If you had lab testing today and your results are reassuring or normal they will be mailed to you or sent through [employer] within 7 days.   **If the lab tests need quick action we will call you with the results.  **If you are having labs done on a different day, please call [phone] to schedule at [facility] or [phone] for other [facility] locations. Labs do not offer walk-in appointments.  The phone number we will call with results is # [phone] (home) . If this is not the best number please call our clinic and change the number.  Medication Refills:  If you need any refills please call your pharmacy and they will contact us.   If you need to  your refill at a new pharmacy, please contact the new pharmacy directly. The new pharmacy will help you get your medications transferred faster.   Scheduling:  If you have any concerns about today's visit or wish to schedule another appointment please call our office during normal business hours [phone] (8-5:00 M-F)  If a referral was made to an  [facility] specialty provider and you do not get a call from central scheduling, please refer to directions on your visit summary or call our office during normal business hours for assistance.   If a Mammogram was ordered for you at the [facility] call [phone] to schedule or change your appointment.  If you had an XRay/CT/Ultrasound/MRI ordered the number is [phone] to schedule or change your radiology appointment.   [facility] has limited ultrasound appointments available on Wednesdays, if you would like your ultrasound at [facility], please call [phone] to schedule.   Medical Concerns:  If you have urgent medical concerns please call [phone] at any time of the day.    Liliam Weinberg MD

## 2022-06-21 NOTE — PROGRESS NOTES
"  Assessment & Plan     Essential hypertension  Controlled - better on 25 mg of hydrochlorothiazide compared to 12.5 mg. BMP today.    - Basic metabolic panel; Future  - Basic metabolic panel    Chronic kidney disease, stage II (mild)  Repeat testing.  Has had proteinuria but GFR greater than 60.   - Basic metabolic panel; Future  - Albumin Random Urine Quantitative with Creat Ratio; Future  - Protein  random urine; Future  - Basic metabolic panel  - Albumin Random Urine Quantitative with Creat Ratio  - Protein  random urine    Prediabetes  Keeping stable in the last 3 years.   - Hemoglobin A1c    Screening for hyperlipidemia  - Lipid panel reflex to direct LDL Fasting; Future  - Lipid panel reflex to direct LDL Fasting      I spent a total of 20 minutes on the day of the visit.   Time spent doing chart review, history and exam, documentation and further activities per the note       BMI:   Estimated body mass index is 31.7 kg/m  as calculated from the following:    Height as of 8/6/21: 2.007 m (6' 7\").    Weight as of this encounter: 127.6 kg (281 lb 6.4 oz).           No follow-ups on file.    Liliam Weinberg MD  [facility] KELI Camacho is a 76 year old, presenting for the following health issues:  RECHECK (6 months check up )      HPI     HTN:  Continue to do the right thing and the right habits. Doesn't eat much salt.  Watches his white foods. Relies on healthy sandwiches .  Exercising - 3 days a week of cyclinc and weights and then walks three days a week as well.     Sciatica flares up but otherwise doing pretty well.  Massage therapy helped.  Recenlty had to use his walking stick for 10 ds.     BP:  Home - lowest  and highest 147. Most around 120 - 130.  Improved since he increased the hydrochlorothiazide to 25 mg.    Also history of proteinuria and pre-diabetes      Wt Readings from Last 4 Encounters:   06/21/22 127.6 kg (281 lb 6.4 oz)   12/03/21 128.4 kg (283 lb) "   11/08/21 129.1 kg (284 lb 11.2 oz)   08/06/21 131.5 kg (290 lb)             Review of Systems         Objective    /71   Pulse 72   Temp 98.9  F (37.2  C) (Oral)   Wt 127.6 kg (281 lb 6.4 oz)   SpO2 97%   BMI 31.70 kg/m    Body mass index is 31.7 kg/m .  Physical Exam                       .  ..

## 2022-06-22 DIAGNOSIS — I10 ESSENTIAL HYPERTENSION: Primary | ICD-10-CM

## 2022-06-22 NOTE — RESULT ENCOUNTER NOTE
Discussed the following results during the clinic visit. See progress note for details.     Liliam Weinberg MD

## 2022-08-24 DIAGNOSIS — E53.8 VITAMIN B12 DEFICIENCY (NON ANEMIC): ICD-10-CM

## 2022-08-24 RX ORDER — LANOLIN ALCOHOL/MO/W.PET/CERES
1000 CREAM (GRAM) TOPICAL DAILY
Qty: 90 TABLET | Refills: 0 | Status: SHIPPED | OUTPATIENT
Start: 2022-08-24

## 2022-08-24 NOTE — TELEPHONE ENCOUNTER
"Request for medication refill:  Vitamin b-12 oral tablet    Providers if patient needs an appointment and you are willing to give a one month supply please refill for one month and  send a letter/MyChart using \".SMILLIMITEDREFILL\" .smillimited and route chart to \"P [facility] \" (Giving one month refill in non controlled medications is strongly recommended before denial)    If refill has been denied, meaning absolutely no refills without visit, please complete the smart phrase \".smirxrefuse\" and route it to the \"P [facility] MED REFILLS\"  pool to inform the patient and the pharmacy.    Tara Mcguire MA      "

## 2022-11-19 ENCOUNTER — HEALTH MAINTENANCE LETTER (OUTPATIENT)
Age: 77
End: 2022-11-19

## 2022-12-06 DIAGNOSIS — K21.9 GASTROESOPHAGEAL REFLUX DISEASE WITHOUT ESOPHAGITIS: ICD-10-CM

## 2022-12-06 NOTE — TELEPHONE ENCOUNTER
"Request for medication refill:  omeprazole (PRILOSEC) 20 MG DR capsule    Providers if patient needs an appointment and you are willing to give a one month supply please refill for one month and  send a letter/MyChart using \".SMILLIMITEDREFILL\" .smillimited and route chart to \"P [facility] \" (Giving one month refill in non controlled medications is strongly recommended before denial)    If refill has been denied, meaning absolutely no refills without visit, please complete the smart phrase \".smirxrefuse\" and route it to the \"P [facility] MED REFILLS\"  pool to inform the patient and the pharmacy.    Maritza Hanson MA        "

## 2022-12-13 ASSESSMENT — ENCOUNTER SYMPTOMS
DYSURIA: 0
HEARTBURN: 0
NERVOUS/ANXIOUS: 0
DIZZINESS: 0
DIARRHEA: 0
ABDOMINAL PAIN: 0
COUGH: 0
PALPITATIONS: 0
FEVER: 0
ARTHRALGIAS: 0
WEAKNESS: 0
HEMATURIA: 0
SHORTNESS OF BREATH: 0
NAUSEA: 0
PARESTHESIAS: 0
EYE PAIN: 0
SORE THROAT: 0
HEADACHES: 0
FREQUENCY: 0
HEMATOCHEZIA: 0
CONSTIPATION: 0
JOINT SWELLING: 0
MYALGIAS: 0
CHILLS: 0

## 2022-12-13 ASSESSMENT — ACTIVITIES OF DAILY LIVING (ADL): CURRENT_FUNCTION: NO ASSISTANCE NEEDED

## 2022-12-14 ENCOUNTER — OFFICE VISIT (OUTPATIENT)
Dept: FAMILY MEDICINE | Facility: CLINIC | Age: 77
End: 2022-12-14
Payer: COMMERCIAL

## 2022-12-14 VITALS
HEART RATE: 78 BPM | OXYGEN SATURATION: 98 % | DIASTOLIC BLOOD PRESSURE: 92 MMHG | BODY MASS INDEX: 32.05 KG/M2 | RESPIRATION RATE: 18 BRPM | SYSTOLIC BLOOD PRESSURE: 155 MMHG | WEIGHT: 277 LBS | TEMPERATURE: 98.6 F | HEIGHT: 78 IN

## 2022-12-14 DIAGNOSIS — R73.09 ELEVATED GLUCOSE: ICD-10-CM

## 2022-12-14 DIAGNOSIS — Z00.00 ENCOUNTER FOR MEDICARE ANNUAL WELLNESS EXAM: Primary | ICD-10-CM

## 2022-12-14 DIAGNOSIS — N18.2 CHRONIC KIDNEY DISEASE, STAGE II (MILD): ICD-10-CM

## 2022-12-14 DIAGNOSIS — R09.89 THROAT CLEARING: ICD-10-CM

## 2022-12-14 LAB
ALBUMIN MFR UR ELPH: 51.1 MG/DL
ANION GAP SERPL CALCULATED.3IONS-SCNC: 12 MMOL/L (ref 7–15)
BUN SERPL-MCNC: 9.4 MG/DL (ref 8–23)
CALCIUM SERPL-MCNC: 9.1 MG/DL (ref 8.8–10.2)
CHLORIDE SERPL-SCNC: 99 MMOL/L (ref 98–107)
CHOLEST SERPL-MCNC: 103 MG/DL
CREAT SERPL-MCNC: 0.84 MG/DL (ref 0.67–1.17)
CREAT UR-MCNC: 166 MG/DL
DEPRECATED HCO3 PLAS-SCNC: 26 MMOL/L (ref 22–29)
GFR SERPL CREATININE-BSD FRML MDRD: >90 ML/MIN/1.73M2
GLUCOSE SERPL-MCNC: 121 MG/DL (ref 70–99)
HBA1C MFR BLD: 5.9 % (ref 0–5.6)
HDLC SERPL-MCNC: 39 MG/DL
LDLC SERPL CALC-MCNC: 33 MG/DL
NONHDLC SERPL-MCNC: 64 MG/DL
POTASSIUM SERPL-SCNC: 4.2 MMOL/L (ref 3.4–5.3)
PROT/CREAT 24H UR: 0.31 MG/MG CR (ref 0–0.2)
SODIUM SERPL-SCNC: 137 MMOL/L (ref 136–145)
TRIGL SERPL-MCNC: 153 MG/DL

## 2022-12-14 PROCEDURE — 84156 ASSAY OF PROTEIN URINE: CPT | Performed by: FAMILY MEDICINE

## 2022-12-14 PROCEDURE — 36415 COLL VENOUS BLD VENIPUNCTURE: CPT | Performed by: FAMILY MEDICINE

## 2022-12-14 PROCEDURE — 83036 HEMOGLOBIN GLYCOSYLATED A1C: CPT | Performed by: FAMILY MEDICINE

## 2022-12-14 PROCEDURE — 80061 LIPID PANEL: CPT | Performed by: FAMILY MEDICINE

## 2022-12-14 PROCEDURE — 80048 BASIC METABOLIC PNL TOTAL CA: CPT | Performed by: FAMILY MEDICINE

## 2022-12-14 PROCEDURE — G0439 PPPS, SUBSEQ VISIT: HCPCS | Performed by: FAMILY MEDICINE

## 2022-12-14 RX ORDER — AZELASTINE 1 MG/ML
1 SPRAY, METERED NASAL 2 TIMES DAILY
Qty: 30 ML | Refills: 4 | Status: SHIPPED | OUTPATIENT
Start: 2022-12-14 | End: 2024-01-15

## 2022-12-14 ASSESSMENT — ENCOUNTER SYMPTOMS
NERVOUS/ANXIOUS: 0
PARESTHESIAS: 0
DYSURIA: 0
SORE THROAT: 0
WEAKNESS: 0
FREQUENCY: 0
MYALGIAS: 0
DIARRHEA: 0
FEVER: 0
PALPITATIONS: 0
JOINT SWELLING: 0
ABDOMINAL PAIN: 0
EYE PAIN: 0
NAUSEA: 0
SHORTNESS OF BREATH: 0
ARTHRALGIAS: 0
HEADACHES: 0
HEARTBURN: 0
CHILLS: 0
CONSTIPATION: 0
COUGH: 0
HEMATURIA: 0
DIZZINESS: 0
HEMATOCHEZIA: 0

## 2022-12-14 ASSESSMENT — ACTIVITIES OF DAILY LIVING (ADL): CURRENT_FUNCTION: NO ASSISTANCE NEEDED

## 2022-12-14 NOTE — PROGRESS NOTES
"SUBJECTIVE:   Doug is a 77 year old who presents for Preventive Visit.  Split billing discussed: yes   Are you in the first 12 months of your Medicare coverage?  No    Doug reports things are going well. His Bps is elevated to 155/92 in clinic today, which he attributes to \"white coat syndrome.\" He checks his BP regularly at home and these readings have been well within target range, 99- 125/60 - 70s. Doug also repeats lifestyle changes involving exercise and eating healthy (which was encouraged after elevated Hgb A1c at least years visit). He is down to 277 lbs, notes his highest weight about three years prior was 300 lbs. They have a gym at their [email or web address] complex he uses. Before COVID worked out at [employer] and would like to start this again in the spring. He is very cautious when walking, especially after a fall on ice and knee injury in 2013. Has enjoyed working with PT in the past. Own walking cane/stick but does not use this. He also reports ongoing throat clearing, saw ENT and ST in the past which was very helpful. Does utilize strategies with ST still. Reports symptoms are ongoing, better some days and worse others. Uses lozenges and intranasal azelastine. Also discussed reflux symptoms, which have been present for several years. In the past tried Pepcid and PPI. Would like the try the PPI again.     Wt Readings from Last 4 Encounters:   12/14/22 125.6 kg (277 lb)   06/21/22 127.6 kg (281 lb 6.4 oz)   12/03/21 128.4 kg (283 lb)   11/08/21 129.1 kg (284 lb 11.2 oz)     Healthy Habits:     In general, how would you rate your overall health?  Good    Frequency of exercise:  2-3 days/week    Duration of exercise:  45-60 minutes    Do you usually eat at least 4 servings of fruit and vegetables a day, include whole grains    & fiber and avoid regularly eating high fat or \"junk\" foods?  Yes    Medication side effects:  Not applicable    Ability to successfully perform activities of daily living:  No assistance " needed    Home Safety:  No safety concerns identified    Hearing Impairment:  No hearing concerns    In the past 6 months, have you been bothered by leaking of urine?  No    In general, how would you rate your overall mental or emotional health?  Good      PHQ-2 Total Score: 0    Additional concerns today:  No      Have you ever done Advance Care Planning? (For example, a Health Directive, POLST, or a discussion with a medical provider or your loved ones about your wishes): Yes, advance care planning is on file.    Fall risk - No falls in the last year, feels safe at home        Cognitive Screening   1) Repeat 3 items (Leader, Season, Table)    2) Clock draw: NORMAL  3) 3 item recall: Recalls 3 objects  Results: 3 items recalled: COGNITIVE IMPAIRMENT LESS LIKELY    Mini-CogTM Copyright RONNY Cruz. Licensed by the author for use in [facility] [employer]; reprinted with permission (odette@[facility]). All rights reserved.      Do you have sleep apnea, excessive snoring or daytime drowsiness?: no    Reviewed and updated as needed this visit by clinical staff    Allergies  Meds              Reviewed and updated as needed this visit by Provider                 Social History     Tobacco Use     Smoking status: Former     Types: Cigarettes     Quit date: 3/18/1980     Years since quittin.7     Smokeless tobacco: Never     Tobacco comments:     Quit many years ago   Substance Use Topics     Alcohol use: Yes     Comment: occasional        Alcohol Use 2022   Prescreen: >3 drinks/day or >7 drinks/week? No     Current providers sharing in care for this patient include:   Patient Care Team:  Liliam Weinberg MD as PCP - General (Family Practice)  Deirrde Dietz MD as MD (Colon and Rectal Surgery)  Jorge Taylor MD as MD (Orthopedics)  Johnna Ferreira MD as MD (Nephrology)  Martina Polanco MD as MD (Otolaryngology)  Liliam Weinberg MD as Assigned PCP  Martina Polanco MD as Assigned Surgical  Provider  Johnna Ferreira MD as Assigned Nephrology Provider  Conrad Luna RPH as Pharmacist (Pharmacist)  Conrad Luna RPH as Assigned [place] Pharmacist    The following health maintenance items are reviewed in Epic and correct as of today:  Health Maintenance   Topic Date Due     FALL RISK ASSESSMENT  08/28/2021     MEDICARE ANNUAL WELLNESS VISIT  12/03/2022     BMP  06/21/2023     LIPID  06/21/2023     MICROALBUMIN  06/21/2023     COLORECTAL CANCER SCREENING  01/08/2026     ADVANCE CARE PLANNING  12/14/2027     DTAP/TDAP/TD IMMUNIZATION (2 - Td or Tdap) 05/03/2031     HEPATITIS C SCREENING  Completed     PHQ-2 (once per calendar year)  Completed     INFLUENZA VACCINE  Completed     Pneumococcal Vaccine: 65+ Years  Completed     URINALYSIS  Completed     ZOSTER IMMUNIZATION  Completed     AORTIC ANEURYSM SCREENING (SYSTEM ASSIGNED)  Completed     COVID-19 Vaccine  Completed     IPV IMMUNIZATION  Aged Out     MENINGITIS IMMUNIZATION  Aged Out     Review of Systems   Constitutional: Negative for chills and fever.   HENT: Negative for congestion, ear pain, hearing loss and sore throat.    Eyes: Negative for pain and visual disturbance.   Respiratory: Negative for cough and shortness of breath.    Cardiovascular: Negative for chest pain, palpitations and peripheral edema.   Gastrointestinal: Negative for abdominal pain, constipation, diarrhea, heartburn, hematochezia and nausea.   Genitourinary: Negative for dysuria, frequency, genital sores, hematuria, impotence, penile discharge and urgency.   Musculoskeletal: Negative for arthralgias, joint swelling and myalgias.   Skin: Negative for rash.   Neurological: Negative for dizziness, weakness, headaches and paresthesias.   Psychiatric/Behavioral: Negative for mood changes. The patient is not nervous/anxious.      Constitutional, HEENT, cardiovascular, pulmonary, gi and gu systems are negative, except as otherwise noted.    OBJECTIVE:   BP (!)  "155/92   Pulse 78   Temp 98.6  F (37  C)   Resp 18   Ht 2.007 m (6' 7\")   Wt 125.6 kg (277 lb)   SpO2 98%   BMI 31.21 kg/m   Estimated body mass index is 31.21 kg/m  as calculated from the following:    Height as of this encounter: 2.007 m (6' 7\").    Weight as of this encounter: 125.6 kg (277 lb).  Physical Exam  GENERAL: healthy, alert and no distress  EYES: Eyes grossly normal to inspection, PERRL and conjunctivae and sclerae normal  HENT: ear canals and TM's normal, nose and mouth without ulcers or lesions  NECK: no adenopathy, no asymmetry, masses, or scars and thyroid normal to palpation  RESP: lungs clear to auscultation - no rales, rhonchi or wheezes  CV: regular rate and rhythm, normal S1 S2, no S3 or S4, no murmur, click or rub, no peripheral edema and peripheral pulses strong  ABDOMEN: soft, nontender, no hepatosplenomegaly, no masses and bowel sounds normal  MS: no gross musculoskeletal defects noted, no edema    Diagnostic Test Results:  Labs reviewed in Epic  No results found for this or any previous visit (from the past 24 hour(s)).    ASSESSMENT / PLAN:   Doug is a 77 year old who presents for preventive visit.    1. Encounter for Medicare annual wellness exam  Up to date on preventive care recommendations. No immunizations due at this time. Fall risk assessment completed. Discussed using cane for added stability as well as PT referral to work on balance.   - Lipid panel; Future    2. Chronic kidney disease, stage II (mild)  Plan to recheck labs today.   - Basic metabolic panel; Future  - Protein  random urine; Future    3. Elevated glucose, prediabetes  Has been stable the last four years. Seven lbs weight loss in the last year. Continue healthy eating and exercise. Plan to recheck A1c today.   - Hemoglobin A1c; Future    4. Throat clearing  Saw ENT in the past, graduated from speech therapy. Patient does not feel additional ST is indicated at this time.   - azelastine (ASTELIN) 0.1 % nasal " "spray; Spray 1 spray into both nostrils 2 times daily  Dispense: 30 mL; Refill: 4    5. Essential HTN  Blood pressures at home within goal range, normally 1teens/70s.   - continue 25 mg of hydrochlorothiazide   - recheck BMP and urine protein today        Patient has been advised of split billing requirements and indicates understanding: Yes    COUNSELING:  Reviewed preventive health counseling, as reflected in patient instructions       Regular exercise       Healthy diet/nutrition       Fall risk prevention      BMI:   Estimated body mass index is 31.21 kg/m  as calculated from the following:    Height as of this encounter: 2.007 m (6' 7\").    Weight as of this encounter: 125.6 kg (277 lb).   Weight management plan: Discussed healthy diet and exercise guidelines      He reports that he quit smoking about 42 years ago. He has never used smokeless tobacco.      Appropriate preventive services were discussed with this patient, including applicable screening as appropriate for cardiovascular disease, diabetes, osteopenia/osteoporosis, and glaucoma.  As appropriate for age/gender, discussed screening for colorectal cancer, prostate cancer, breast cancer, and cervical cancer. Checklist reviewing preventive services available has been given to the patient.    Reviewed patients plan of care and provided an AVS. The Basic Care Plan (routine screening as documented in Health Maintenance) for Doug meets the Care Plan requirement. This Care Plan has been established and reviewed with the Patient and spouse.      Liliam Weinberg MD  [facility]    Identified Health Risks: CKD  "

## 2022-12-14 NOTE — PATIENT INSTRUCTIONS
Patient Education   Personalized Prevention Plan  You are due for the preventive services outlined below.  Your care team is available to assist you in scheduling these services.  If you have already completed any of these items, please share that information with your care team to update in your medical record.  Health Maintenance Due   Topic Date Due    FALL RISK ASSESSMENT  08/28/2021    Annual Wellness Visit  12/03/2022      Patient Education   Here is the plan from today's visit    1. Encounter for Medicare annual wellness exam - looking great!!!  - Lipid panel; Future    2. Chronic kidney disease, stage II (mild) - checking this   - Basic metabolic panel; Future  - Protein  random urine; Future    3. Elevated glucose  - Hemoglobin A1c; Future    4. Throat clearing  - azelastine (ASTELIN) 0.1 % nasal spray; Spray 1 spray into both nostrils 2 times daily  Dispense: 30 mL; Refill: 4      Please call or return to clinic if your symptoms don't go away.    Follow up plan  Return in about 53 weeks (around 12/20/2023) for Annual Wellness Visit.    Thank you for coming to [facility] today.  Lab Testing:  **If you had lab testing today and your results are reassuring or normal they will be mailed to you or sent through [employer] within 7 days.   **If the lab tests need quick action we will call you with the results.  **If you are having labs done on a different day, please call [phone] to schedule at [facility] or [phone] for other [facility]patient Lab locations. Labs do not offer walk-in appointments.  The phone number we will call with results is # [phone] (home) . If this is not the best number please call our clinic and change the number.  Medication Refills:  If you need any refills please call your pharmacy and they will contact us.   If you need to  your refill at a new pharmacy, please contact the new pharmacy directly. The new pharmacy will help you get your medications  transferred faster.   Scheduling:  If you have any concerns about today's visit or wish to schedule another appointment please call our office during normal business hours [phone] (8-5:00 M-F). If you can no longer make a scheduled visit, please cancel via neoSaejt or call us to cancel.   If a referral was made to an [facility] specialty provider and you do not get a call from central scheduling, please refer to directions on your visit summary or call our office during normal business hours for assistance.   If a Mammogram was ordered for you at the Breast Center call [phone] to schedule or change your appointment.  If you had an XRay/CT/Ultrasound/MRI ordered the number is [phone] to schedule or change your radiology appointment.   [facility] has limited ultrasound appointments available on Wednesdays, if you would like your ultrasound at [facility], please call [phone] to schedule.   Medical Concerns:  If you have urgent medical concerns please call [phone] at any time of the day.    Liliam Weinberg MD

## 2023-04-30 DIAGNOSIS — K21.9 GASTROESOPHAGEAL REFLUX DISEASE WITHOUT ESOPHAGITIS: ICD-10-CM

## 2023-05-01 NOTE — TELEPHONE ENCOUNTER
"Request for medication refill:  omeprazole (PRILOSEC) 20 MG DR capsule    Providers if patient needs an appointment and you are willing to give a one month supply please refill for one month and  send a letter/MyChart using \".SMILLIMITEDREFILL\" .smillimited and route chart to \"[facility] \" (Giving one month refill in non controlled medications is strongly recommended before denial)    If refill has been denied, meaning absolutely no refills without visit, please complete the smart phrase \".smirxrefuse\" and route it to the \"[facility] MED REFILLS\"  pool to inform the patient and the pharmacy.    Maritza Hanson MA        "

## 2023-06-02 DIAGNOSIS — I10 HYPERTENSION, ESSENTIAL: ICD-10-CM

## 2023-06-06 RX ORDER — LOSARTAN POTASSIUM AND HYDROCHLOROTHIAZIDE 25; 100 MG/1; MG/1
1 TABLET ORAL DAILY
Qty: 90 TABLET | Refills: 2 | Status: SHIPPED | OUTPATIENT
Start: 2023-06-06 | End: 2024-03-04

## 2023-06-06 NOTE — TELEPHONE ENCOUNTER
"Last seen 12/14/2022    Request for medication refill:  losartan-hydrochlorothiazide (HYZAAR) 100-25 MG tablet  Providers if patient needs an appointment and you are willing to give a one month supply please refill for one month and  send a letter/MyChart using \".SMILLIMITEDREFILL\" .smillimited and route chart to \"P [facility] \" (Giving one month refill in non controlled medications is strongly recommended before denial)    If refill has been denied, meaning absolutely no refills without visit, please complete the smart phrase \".smirxrefuse\" and route it to the \"[facility] MED REFILLS\"  pool to inform the patient and the pharmacy.    SEBLE FRYE MA      "

## 2023-06-27 ENCOUNTER — OFFICE VISIT (OUTPATIENT)
Dept: FAMILY MEDICINE | Facility: CLINIC | Age: 78
End: 2023-06-27
Payer: COMMERCIAL

## 2023-06-27 VITALS
HEART RATE: 80 BPM | DIASTOLIC BLOOD PRESSURE: 83 MMHG | OXYGEN SATURATION: 98 % | SYSTOLIC BLOOD PRESSURE: 123 MMHG | RESPIRATION RATE: 17 BRPM | BODY MASS INDEX: 31.45 KG/M2 | TEMPERATURE: 98.1 F | WEIGHT: 279.2 LBS

## 2023-06-27 DIAGNOSIS — R73.09 ELEVATED GLUCOSE: ICD-10-CM

## 2023-06-27 DIAGNOSIS — N18.2 CHRONIC KIDNEY DISEASE, STAGE II (MILD): Primary | ICD-10-CM

## 2023-06-27 DIAGNOSIS — I10 ESSENTIAL HYPERTENSION: ICD-10-CM

## 2023-06-27 LAB
CREAT UR-MCNC: 326 MG/DL
HBA1C MFR BLD: 5.9 % (ref 0–5.6)
MICROALBUMIN UR-MCNC: 323 MG/L
MICROALBUMIN/CREAT UR: 99.08 MG/G CR (ref 0–17)

## 2023-06-27 PROCEDURE — 83036 HEMOGLOBIN GLYCOSYLATED A1C: CPT | Performed by: FAMILY MEDICINE

## 2023-06-27 PROCEDURE — 36415 COLL VENOUS BLD VENIPUNCTURE: CPT | Performed by: FAMILY MEDICINE

## 2023-06-27 PROCEDURE — 99214 OFFICE O/P EST MOD 30 MIN: CPT | Performed by: FAMILY MEDICINE

## 2023-06-27 PROCEDURE — 82570 ASSAY OF URINE CREATININE: CPT | Performed by: FAMILY MEDICINE

## 2023-06-27 PROCEDURE — 82043 UR ALBUMIN QUANTITATIVE: CPT | Performed by: FAMILY MEDICINE

## 2023-06-27 PROCEDURE — 80048 BASIC METABOLIC PNL TOTAL CA: CPT | Performed by: FAMILY MEDICINE

## 2023-06-27 NOTE — PROGRESS NOTES
"  Assessment & Plan     Chronic kidney disease, stage II (mild)  Continue with current meds. His BPs are more controlled and he is aware that if he goes too low, that we might want to drop his meds.  Routine testing today.   - Basic metabolic panel; Future  - Albumin Random Urine Quantitative with Creat Ratio; Future  - Basic metabolic panel  - Albumin Random Urine Quantitative with Creat Ratio    Elevated glucose  Strong FHx and so we are monitoring. Is no worse.   - Hemoglobin A1c; Future  - Hemoglobin A1c    Essential hypertension  Controlled.                BMI:   Estimated body mass index is 31.45 kg/m  as calculated from the following:    Height as of 12/14/22: 2.007 m (6' 7\").    Weight as of this encounter: 126.6 kg (279 lb 3.2 oz).           No follow-ups on file.    Liliam Weinberg MD  [facility] KELI Camacho is a 77 year old, presenting for the following health issues:  RECHECK        6/27/2023     4:14 PM   Additional Questions   Roomed by sally   Accompanied by self     HPI     Here for HTN check. BPs at home in the 108-132/67-84.  Only one day was 134/84. The rest of the time are in the 1teens-120s over 60s to 70s. One day was 96/67 but was thinking that was due to a faulty battery.       Feeling OK. No complaints.       ENT - getting a bit better. No longer having to cough all the time.   Dentist - needing three extractions.     Polyneuropathy - notes it is Ok with his accupuncture but is more careful walking.  Also having more sciatica, for which he is seeing PT.             Review of Systems         Objective    /83   Pulse 80   Temp 98.1  F (36.7  C)   Resp 17   Wt 126.6 kg (279 lb 3.2 oz)   SpO2 98%   BMI 31.45 kg/m    Body mass index is 31.45 kg/m .  Physical Exam                       "

## 2023-06-28 LAB
ANION GAP SERPL CALCULATED.3IONS-SCNC: 13 MMOL/L (ref 7–15)
BUN SERPL-MCNC: 10.6 MG/DL (ref 8–23)
CALCIUM SERPL-MCNC: 9.1 MG/DL (ref 8.8–10.2)
CHLORIDE SERPL-SCNC: 100 MMOL/L (ref 98–107)
CREAT SERPL-MCNC: 0.99 MG/DL (ref 0.67–1.17)
DEPRECATED HCO3 PLAS-SCNC: 25 MMOL/L (ref 22–29)
GFR SERPL CREATININE-BSD FRML MDRD: 79 ML/MIN/1.73M2
GLUCOSE SERPL-MCNC: 105 MG/DL (ref 70–99)
POTASSIUM SERPL-SCNC: 3.8 MMOL/L (ref 3.4–5.3)
SODIUM SERPL-SCNC: 138 MMOL/L (ref 136–145)

## 2023-07-25 DIAGNOSIS — I10 ESSENTIAL HYPERTENSION: ICD-10-CM

## 2023-07-25 RX ORDER — ATORVASTATIN CALCIUM 40 MG/1
40 TABLET, FILM COATED ORAL DAILY
Qty: 90 TABLET | Refills: 3 | Status: SHIPPED | OUTPATIENT
Start: 2023-07-25 | End: 2024-07-22

## 2023-07-25 NOTE — TELEPHONE ENCOUNTER
"Request for medication refill:  atorvastatin (LIPITOR) 40 MG tablet       Providers if patient needs an appointment and you are willing to give a one month supply please refill for one month and  send a letter/MyChart using \".SMILLIMITEDREFILL\" .smillimited and route chart to \"[facility] \" (Giving one month refill in non controlled medications is strongly recommended before denial)    If refill has been denied, meaning absolutely no refills without visit, please complete the smart phrase \".smirxrefuse\" and route it to the \"[facility] MED REFILLS\"  pool to inform the patient and the pharmacy.    Timmy Manuel, [facility]    "

## 2023-11-03 DIAGNOSIS — K21.9 GASTROESOPHAGEAL REFLUX DISEASE WITHOUT ESOPHAGITIS: ICD-10-CM

## 2023-11-06 NOTE — TELEPHONE ENCOUNTER
"Request for medication refill:  omeprazole (PRILOSEC) 20 MG DR capsule     Providers if patient needs an appointment and you are willing to give a one month supply please refill for one month and  send a letter/MyChart using \".SMILLIMITEDREFILL\" .smillimited and route chart to \"[facility] \" (Giving one month refill in non controlled medications is strongly recommended before denial)    If refill has been denied, meaning absolutely no refills without visit, please complete the smart phrase \".smirxrefuse\" and route it to the \"[facility] MED REFILLS\"  pool to inform the patient and the pharmacy.    Mayda South, CMA      "

## 2023-11-21 ENCOUNTER — OFFICE VISIT (OUTPATIENT)
Dept: FAMILY MEDICINE | Facility: CLINIC | Age: 78
End: 2023-11-21
Payer: COMMERCIAL

## 2023-11-21 VITALS
RESPIRATION RATE: 16 BRPM | HEART RATE: 69 BPM | WEIGHT: 275 LBS | HEIGHT: 78 IN | DIASTOLIC BLOOD PRESSURE: 90 MMHG | TEMPERATURE: 98.5 F | OXYGEN SATURATION: 96 % | SYSTOLIC BLOOD PRESSURE: 142 MMHG | BODY MASS INDEX: 31.82 KG/M2

## 2023-11-21 DIAGNOSIS — J06.9 UPPER RESPIRATORY TRACT INFECTION, UNSPECIFIED TYPE: Primary | ICD-10-CM

## 2023-11-21 PROCEDURE — 99213 OFFICE O/P EST LOW 20 MIN: CPT | Mod: GC

## 2023-11-21 NOTE — PROGRESS NOTES
"  Assessment & Plan     Upper respiratory tract infection, unspecified type  Ear blockage  Patient presenting with symptoms of ear block/pressure bilaterally. Uses ear drops and syringe with water in it daily to help clear out earwax which typically works for him. Exam reveals moderate amount of fluid behind TM bilaterally--no erythema or bulging, no concern for ear infection. Likely fluid buildup in the setting of a recent URI. Recommend allyssa pot, steaming in hot shower, continue with current nasal spray. Could consider flonase/zyrtec if not resolving. Has follow up already scheduled for an annual visit.    Return if symptoms worsen or fail to improve.    Mary Pearson MD  [facility] KELI Camacho is a 78 year old, presenting for the following health issues:  Ear Lavage (Ear wash)        11/21/2023     4:24 PM   Additional Questions   Roomed by Aliyah   Accompanied by self       HPI     Feels like he's having extra ear blockage/pressure. Comes and goes.  Started using drops a number of years ago, uses syringes with water with a daily shower  No q-tips  Wonders if hearing has been affected  No tinnitus  Has an ongoing sinus congestion thing going on--saw ENT a couple years ago and they said all was well        Objective    BP (!) 142/90   Pulse 69   Temp 98.5  F (36.9  C) (Oral)   Resp 16   Ht 2.007 m (6' 7\")   Wt 124.7 kg (275 lb)   SpO2 96%   BMI 30.98 kg/m    Body mass index is 30.98 kg/m .  Physical Exam   Constitutional: awake, alert, cooperative, no apparent distress, and appears stated age  Eyes: Lids and lashes normal, pupils equal, round and reactive to light, extra ocular muscles intact, sclera clear, conjunctiva normal  ENT: normocepalic, without obvious abnormality. Gray fluid visualized behind Tms bilaterally, no erythema or bulging  Hematologic / Lymphatic: no cervical lymphadenopathy and no supraclavicular lymphadenopathy  Respiratory: No increased work of " breathing, good air exchange, clear to auscultation bilaterally, no crackles or wheezing  Cardiovascular: Normal apical impulse, regular rate and rhythm, normal S1 and S2, no S3 or S4, and no murmur noted  GI: soft, non-distended and non-tender  Musculoskeletal: no lower extremity pitting edema present  full range of motion noted  tone is normal      ----- Service Performed and Documented by Resident or Fellow ------

## 2023-11-21 NOTE — PATIENT INSTRUCTIONS
Patient Education   Here is the plan from today's visit    1. Upper respiratory tract infection, unspecified type  Ear blockage  Use a allyssa pot or hot shower with steam to help clear out sinuses. Can consider zyrtec or flonase nasal spary if symptoms do not improve    Please call or return to clinic if your symptoms don't go away.    Follow up plan  Return if symptoms worsen or fail to improve.    Thank you for coming to [facility] today.  Lab Testing:  **If you had lab testing today and your results are reassuring or normal they will be mailed to you or sent through [employer] within 7 days.   **If the lab tests need quick action we will call you with the results.  **If you are having labs done on a different day, please call [phone] to schedule at [facility] or [phone] for other [facility] Outpatient Lab locations. Labs do not offer walk-in appointments.  The phone number we will call with results is # [phone] (home) . If this is not the best number please call our clinic and change the number.  Medication Refills:  If you need any refills please call your pharmacy and they will contact us.   If you need to  your refill at a new pharmacy, please contact the new pharmacy directly. The new pharmacy will help you get your medications transferred faster.   Scheduling:  If you have any concerns about today's visit or wish to schedule another appointment please call our office during normal business hours [phone] (8-5:00 M-F). If you can no longer make a scheduled visit, please cancel via [employer] or call us to cancel.   If a referral was made to an [facility] specialty provider and you do not get a call from central scheduling, please refer to directions on your visit summary or call our office during normal business hours for assistance.   If a Mammogram was ordered for you at the Breast Center call [phone] to schedule or change your appointment.  If you had an  XRay/CT/Ultrasound/MRI ordered the number is [phone] to schedule or change your radiology appointment.   [facility] has limited ultrasound appointments available on Wednesdays, if you would like your ultrasound at [facility], please call [phone] to schedule.   Medical Concerns:  If you have urgent medical concerns please call [phone] at any time of the day.    Mary Pearson MD

## 2023-11-21 NOTE — PROGRESS NOTES
Preceptor Attestation:   Patient seen, evaluated and discussed with the resident. I have verified the content of the note, which accurately reflects my assessment of the patient and the plan of care.   Supervising Physician:  Kasey Gonzalez MD

## 2023-12-12 ASSESSMENT — ACTIVITIES OF DAILY LIVING (ADL): CURRENT_FUNCTION: NO ASSISTANCE NEEDED

## 2023-12-12 ASSESSMENT — ENCOUNTER SYMPTOMS
CHILLS: 0
NERVOUS/ANXIOUS: 0
HEMATURIA: 0
WEAKNESS: 0
SHORTNESS OF BREATH: 0
DIARRHEA: 0
DIZZINESS: 0
HEARTBURN: 0
FREQUENCY: 0
COUGH: 0
DYSURIA: 0
EYE PAIN: 0
SORE THROAT: 0
HEADACHES: 0
FEVER: 0
ABDOMINAL PAIN: 0
MYALGIAS: 0
PARESTHESIAS: 0
JOINT SWELLING: 0
NAUSEA: 0
CONSTIPATION: 0
HEMATOCHEZIA: 0
ARTHRALGIAS: 0
PALPITATIONS: 0

## 2023-12-15 ENCOUNTER — OFFICE VISIT (OUTPATIENT)
Dept: FAMILY MEDICINE | Facility: CLINIC | Age: 78
End: 2023-12-15
Payer: COMMERCIAL

## 2023-12-15 VITALS
DIASTOLIC BLOOD PRESSURE: 87 MMHG | BODY MASS INDEX: 31.93 KG/M2 | HEIGHT: 78 IN | WEIGHT: 276 LBS | TEMPERATURE: 98 F | HEART RATE: 68 BPM | OXYGEN SATURATION: 97 % | RESPIRATION RATE: 19 BRPM | SYSTOLIC BLOOD PRESSURE: 148 MMHG

## 2023-12-15 DIAGNOSIS — Z00.00 ENCOUNTER FOR MEDICARE ANNUAL WELLNESS EXAM: Primary | ICD-10-CM

## 2023-12-15 DIAGNOSIS — R73.03 PREDIABETES: ICD-10-CM

## 2023-12-15 DIAGNOSIS — E78.5 HYPERLIPIDEMIA LDL GOAL <100: ICD-10-CM

## 2023-12-15 DIAGNOSIS — I10 ESSENTIAL HYPERTENSION: ICD-10-CM

## 2023-12-15 DIAGNOSIS — Z12.11 SCREEN FOR COLON CANCER: ICD-10-CM

## 2023-12-15 DIAGNOSIS — E53.8 VITAMIN B12 DEFICIENCY (NON ANEMIC): ICD-10-CM

## 2023-12-15 LAB
ANION GAP SERPL CALCULATED.3IONS-SCNC: 11 MMOL/L (ref 7–15)
BUN SERPL-MCNC: 12.1 MG/DL (ref 8–23)
CALCIUM SERPL-MCNC: 9.7 MG/DL (ref 8.8–10.2)
CHLORIDE SERPL-SCNC: 100 MMOL/L (ref 98–107)
CHOLEST SERPL-MCNC: 113 MG/DL
CREAT SERPL-MCNC: 0.93 MG/DL (ref 0.67–1.17)
DEPRECATED HCO3 PLAS-SCNC: 30 MMOL/L (ref 22–29)
EGFRCR SERPLBLD CKD-EPI 2021: 85 ML/MIN/1.73M2
FASTING STATUS PATIENT QL REPORTED: NORMAL
GLUCOSE SERPL-MCNC: 113 MG/DL (ref 70–99)
HBA1C MFR BLD: 5.6 % (ref 0–5.6)
HDLC SERPL-MCNC: 41 MG/DL
LDLC SERPL CALC-MCNC: 47 MG/DL
NONHDLC SERPL-MCNC: 72 MG/DL
POTASSIUM SERPL-SCNC: 3.7 MMOL/L (ref 3.4–5.3)
SODIUM SERPL-SCNC: 141 MMOL/L (ref 135–145)
TRIGL SERPL-MCNC: 123 MG/DL

## 2023-12-15 PROCEDURE — 80061 LIPID PANEL: CPT | Performed by: FAMILY MEDICINE

## 2023-12-15 PROCEDURE — G0439 PPPS, SUBSEQ VISIT: HCPCS | Performed by: FAMILY MEDICINE

## 2023-12-15 PROCEDURE — 36415 COLL VENOUS BLD VENIPUNCTURE: CPT | Performed by: FAMILY MEDICINE

## 2023-12-15 PROCEDURE — 80048 BASIC METABOLIC PNL TOTAL CA: CPT | Performed by: FAMILY MEDICINE

## 2023-12-15 PROCEDURE — 83036 HEMOGLOBIN GLYCOSYLATED A1C: CPT | Performed by: FAMILY MEDICINE

## 2023-12-15 RX ORDER — LANOLIN ALCOHOL/MO/W.PET/CERES
1000 CREAM (GRAM) TOPICAL DAILY
Qty: 90 TABLET | Refills: 0 | Status: CANCELLED | OUTPATIENT
Start: 2023-12-15

## 2023-12-15 ASSESSMENT — ENCOUNTER SYMPTOMS
HEADACHES: 0
FREQUENCY: 0
DIARRHEA: 0
PALPITATIONS: 0
CONSTIPATION: 0
SHORTNESS OF BREATH: 0
ABDOMINAL PAIN: 0
PARESTHESIAS: 0
WEAKNESS: 0
HEARTBURN: 0
SORE THROAT: 0
ARTHRALGIAS: 0
NAUSEA: 0
HEMATOCHEZIA: 0
MYALGIAS: 0
FEVER: 0
NERVOUS/ANXIOUS: 0
HEMATURIA: 0
COUGH: 0
CHILLS: 0
JOINT SWELLING: 0
DIZZINESS: 0
DYSURIA: 0
EYE PAIN: 0

## 2023-12-15 ASSESSMENT — ACTIVITIES OF DAILY LIVING (ADL): CURRENT_FUNCTION: NO ASSISTANCE NEEDED

## 2023-12-15 NOTE — PROGRESS NOTES
"SUBJECTIVE:   Doug is a 78 year old, presenting for the following:  Physical        12/15/2023    10:07 AM   Additional Questions   Roomed by zenaida   Accompanied by self         12/15/2023    10:07 AM   Patient Reported Additional Medications   Patient reports taking the following new medications none       Are you in the first 12 months of your Medicare coverage?  No    Doing well today, no concerns today    BP readings at home, 120-130/70-85    Ears- seem to be okay, no new concerns    Sees dentist regularaly - dr luo in [facility]]  - had oral sx Oct 6, six teeth extracted   - will be seeing dentist next week    Yes to RSV    Defer colonoscopy if possible    Retired, spends time at [facility] [facility], goes out to lunch        Healthy Habits:     In general, how would you rate your overall health?  Excellent    Frequency of exercise:  2-3 days/week    Duration of exercise:  45-60 minutes    Do you usually eat at least 4 servings of fruit and vegetables a day, include whole grains    & fiber and avoid regularly eating high fat or \"junk\" foods?  No    Taking medications regularly:  Yes    Medication side effects:  Not applicable    Ability to successfully perform activities of daily living:  No assistance needed    Home Safety:  No safety concerns identified    Hearing Impairment:  No hearing concerns    In the past 6 months, have you been bothered by leaking of urine?  No    In general, how would you rate your overall mental or emotional health?  Excellent    Additional concerns today:  No      Today's PHQ-2 Score:       12/15/2023    10:02 AM   PHQ-2 ( 1999 [employer])   Q1: Little interest or pleasure in doing things 0   Q2: Feeling down, depressed or hopeless 0   PHQ-2 Score 0   Q1: Little interest or pleasure in doing things Not at all   Q2: Feeling down, depressed or hopeless Not at all   PHQ-2 Score 0     Wt Readings from Last 4 Encounters:   12/15/23 125.2 kg (276 lb)   11/21/23 124.7 kg (275 lb)   06/27/23 126.6 kg (279 lb " 3.2 oz)   22 125.6 kg (277 lb)           Have you ever done Advance Care Planning? (For example, a Health Directive, POLST, or a discussion with a medical provider or your loved ones about your wishes): Yes, advance care planning is on file.       Fall risk  Fallen 2 or more times in the past year?: No  Any fall with injury in the past year?: No    Cognitive Screening   1) Repeat 3 items (Leader, Season, Table)    2) Clock draw: NORMAL  3) 3 item recall: Recalls 3 objects  Results: NORMAL clock, 1-2 items recalled: COGNITIVE IMPAIRMENT LESS LIKELY    Mini-CogTM Copyright S Anthony. Licensed by the author for use in [facility]; reprinted with permission (somónica@[facility]). All rights reserved.      Do you have sleep apnea, excessive snoring or daytime drowsiness? : no    Reviewed and updated as needed this visit by clinical staff   Tobacco  Allergies  Meds  Problems  Med Hx  Surg Hx  Fam Hx          Reviewed and updated as needed this visit by Provider   Tobacco  Allergies  Meds  Problems  Med Hx  Surg Hx  Fam Hx         Social History     Tobacco Use    Smoking status: Former     Types: Cigarettes     Quit date: 3/18/1980     Years since quittin.7    Smokeless tobacco: Never    Tobacco comments:     Quit many years ago   Substance Use Topics    Alcohol use: Yes     Comment: occasional              2023    10:09 AM   Alcohol Use   Prescreen: >3 drinks/day or >7 drinks/week? No     Do you have a current opioid prescription? No  Do you use any other controlled substances or medications that are not prescribed by a provider? None              Current providers sharing in care for this patient include:   Patient Care Team:  Liliam Weinberg MD as PCP - General (Family Practice)  Deirdre Dietz MD as MD (Colon and Rectal Surgery)  Jorge Taylor MD as MD (Orthopedics)  Johnna Ferreira MD as MD (Nephrology)  Martina Polanco MD as MD (Otolaryngology)  Lenard  MD Liliam as Assigned PCP  Conrad Luna RPH as Pharmacist (Pharmacist)    The following health maintenance items are reviewed in Epic and correct as of today:  Health Maintenance   Topic Date Due    RSV VACCINE (Pregnancy & 60+) (1 - 1-dose 60+ series) Never done    LIPID  12/14/2023    MEDICARE ANNUAL WELLNESS VISIT  12/14/2023    COLORECTAL CANCER SCREENING  01/08/2024    BMP  06/27/2024    MICROALBUMIN  06/27/2024    FALL RISK ASSESSMENT  12/15/2024    ADVANCE CARE PLANNING  12/15/2028    DTAP/TDAP/TD IMMUNIZATION (2 - Td or Tdap) 05/03/2031    HEPATITIS C SCREENING  Completed    PHQ-2 (once per calendar year)  Completed    INFLUENZA VACCINE  Completed    Pneumococcal Vaccine: 65+ Years  Completed    URINALYSIS  Completed    ZOSTER IMMUNIZATION  Completed    COVID-19 Vaccine  Completed    IPV IMMUNIZATION  Aged Out    HPV IMMUNIZATION  Aged Out    MENINGITIS IMMUNIZATION  Aged Out    RSV MONOCLONAL ANTIBODY  Aged Out               Review of Systems   Constitutional:  Negative for chills and fever.   HENT:  Negative for congestion, ear pain, hearing loss and sore throat.    Eyes:  Negative for pain and visual disturbance.   Respiratory:  Negative for cough and shortness of breath.    Cardiovascular:  Negative for chest pain, palpitations and peripheral edema.   Gastrointestinal:  Negative for abdominal pain, constipation, diarrhea, heartburn, hematochezia and nausea.   Genitourinary:  Negative for dysuria, frequency, genital sores, hematuria, impotence, penile discharge and urgency.   Musculoskeletal:  Negative for arthralgias, joint swelling and myalgias.   Skin:  Negative for rash.   Neurological:  Negative for dizziness, weakness, headaches and paresthesias.   Psychiatric/Behavioral:  Negative for mood changes. The patient is not nervous/anxious.          OBJECTIVE:   BP (!) 148/87 (BP Location: Left arm, Patient Position: Sitting, Cuff Size: Adult Large)   Pulse 68   Temp 98  F (36.7  C)   Resp  "19   Ht 2.007 m (6' 7\")   Wt 125.2 kg (276 lb)   SpO2 97%   BMI 31.09 kg/m   Estimated body mass index is 31.09 kg/m  as calculated from the following:    Height as of this encounter: 2.007 m (6' 7\").    Weight as of this encounter: 125.2 kg (276 lb).  Physical Exam          ASSESSMENT / PLAN:   Doug was seen today for physical.    Diagnoses and all orders for this visit:    Encounter for Medicare annual wellness exam - doing really well     Vitamin B12 deficiency (non anemic) - refill     Hyperlipidemia LDL goal <100 - checking today  -     Lipid panel reflex to direct LDL Non-fasting; Future  -     Lipid panel reflex to direct LDL Non-fasting    Screen for colon cancer - discussion regarding 4 polyps biopsied 3 years ago and 3 being adenomas and whether worth doing another scope. Chooses to do one but later in the summer  -     Colonoscopy Screening  Referral; Future    Essential hypertension - controlled at home. Has proteinuria without edema and on max meds.  Will continue to monitor and if any worsening would refer to nephrology. Could consider SGPT at next visit.   -     Basic metabolic panel; Future  -     Basic metabolic panel    Prediabetes - continue to work on exercise and nutrition and repeat monitoring due to FHx  -     Hemoglobin A1c; Future  -     Hemoglobin A1c        Patient has been advised of split billing requirements and indicates understanding: No      COUNSELING:  Reviewed preventive health counseling, as reflected in patient instructions       Regular exercise       Colon cancer screening      BMI:   Estimated body mass index is 31.09 kg/m  as calculated from the following:    Height as of this encounter: 2.007 m (6' 7\").    Weight as of this encounter: 125.2 kg (276 lb).   Weight management plan: Appropriate for age      He reports that he quit smoking about 43 years ago. He has never used smokeless tobacco.      Appropriate preventive services were discussed with this patient, " including applicable screening as appropriate for fall prevention, nutrition, physical activity, Tobacco-use cessation, weight loss and cognition.  Checklist reviewing preventive services available has been given to the patient.    Reviewed patients plan of care and provided an AVS. The Basic Care Plan (routine screening as documented in Health Maintenance) for Doug meets the Care Plan requirement. This Care Plan has been established and reviewed with the Patient.          Liliam Weinberg MD  [facility]    Identified Health Risks:  I have reviewed Opioid Use Disorder and Substance Use Disorder risk factors and made any needed referrals.   The patient was counseled and encouraged to consider modifying their diet and eating habits. He was provided with information on recommended healthy diet options.

## 2023-12-15 NOTE — PATIENT INSTRUCTIONS
Patient Education   Personalized Prevention Plan  You are due for the preventive services outlined below.  Your care team is available to assist you in scheduling these services.  If you have already completed any of these items, please share that information with your care team to update in your medical record.  Health Maintenance Due   Topic Date Due    RSV VACCINE (Pregnancy & 60+) (1 - 1-dose 60+ series) Never done    Cholesterol Lab  12/14/2023    Annual Wellness Visit  12/14/2023    Colorectal Cancer Screening  01/08/2024     Learning About Dietary Guidelines  What are the Dietary Guidelines for Americans?     Dietary Guidelines for Americans provide tips for eating well and staying healthy. This helps reduce the risk for long-term (chronic) diseases.  These guidelines recommend that you:  Eat and drink the right amount for you. The U.S. government's food guide is called MyPlate. It can help you make your own well-balanced eating plan.  Try to balance your eating with your activity. This helps you stay at a healthy weight.  Drink alcohol in moderation, if at all.  Limit foods high in salt, saturated fat, trans fat, and added sugar.  These guidelines are from the U.S. Department of Agriculture and the U.S. Department of Health and Human Services. They are updated every 5 years.  What is MyPlate?  MyPlate is the U.S. government's food guide. It can help you make your own well-balanced eating plan. A balanced eating plan means that you eat enough, but not too much, and that your food gives you the nutrients you need to stay healthy.  MyPlate focuses on eating plenty of whole grains, fruits, and vegetables, and on limiting fat and sugar. It is available online at www.ChooseMyPlate.gov.  How can you get started?  If you're trying to eat healthier, you can slowly change your eating habits over time. You don't have to make big changes all at once. Start by adding one or two healthy foods to your meals each  "day.  Grains  Choose whole-grain breads and cereals and whole-wheat pasta and whole-grain crackers.  Vegetables  Eat a variety of vegetables every day. They have lots of nutrients and are part of a heart-healthy diet.  Fruits  Eat a variety of fruits every day. Fruits contain lots of nutrients. Choose fresh fruit instead of fruit juice.  Protein foods  Choose fish and lean poultry more often. Eat red meat and fried meats less often. Dried beans, tofu, and nuts are also good sources of protein.  Dairy  Choose low-fat or fat-free products from this food group. If you have problems digesting milk, try eating cheese or yogurt instead.  Fats and oils  Limit fats and oils if you're trying to cut calories. Choose healthy fats when you cook. These include canola oil and olive oil.  Where can you learn more?  Go to https://www.Fanatic[employer].net/patiented  Enter D676 in the search box to learn more about \"Learning About Dietary Guidelines.\"  Current as of: February 28, 2023               Content Version: 13.8    6316-5113 [employer].   Care instructions adapted under license by your healthcare professional. If you have questions about a medical condition or this instruction, always ask your healthcare professional. [employer] disclaims any warranty or liability for your use of this information.    Patient Education   Here is the plan from today's visit    1. Vitamin B12 deficiency (non anemic)  Over the counter    2. Hyperlipidemia LDL goal <100  Will check it today     3. Screen for colon cancer  Plan for the summer    4. Essential hypertension  Is controlled - keep on checking     5. Prediabetes  Will recheck     6. Encounter for Medicare annual wellness exam  Doing well!!      Please call or return to clinic if your symptoms don't go away.    Follow up plan  Return in about 53 weeks (around 12/20/2024) for Annual Wellness Visit.    Thank you for coming to [facility] today.  Lab Testing:  **If " you had lab testing today and your results are reassuring or normal they will be mailed to you or sent through [employer] within 7 days.   **If the lab tests need quick action we will call you with the results.  **If you are having labs done on a different day, please call [phone] to schedule at [facility] or [phone] for other [facility]patient [facility] locations. Labs do not offer walk-in appointments.  The phone number we will call with results is # [phone] (home) . If this is not the best number please call our clinic and change the number.  Medication Refills:  If you need any refills please call your pharmacy and they will contact us.   If you need to  your refill at a new pharmacy, please contact the new pharmacy directly. The new pharmacy will help you get your medications transferred faster.   Scheduling:  If you have any concerns about today's visit or wish to schedule another appointment please call our office during normal business hours [phone] (8-5:00 M-F). If you can no longer make a scheduled visit, please cancel via [employer] or call us to cancel.   If a referral was made to an [facility] specialty provider and you do not get a call from central scheduling, please refer to directions on your visit summary or call our office during normal business hours for assistance.   If a Mammogram was ordered for you at the Breast Center call [phone] to schedule or change your appointment.  If you had an XRay/CT/Ultrasound/MRI ordered the number is [phone] to schedule or change your radiology appointment.   [facility] has limited ultrasound appointments available on Wednesdays, if you would like your ultrasound at [facility], please call [phone] to schedule.   Medical Concerns:  If you have urgent medical concerns please call [phone] at any time of the day.    Liliam Weinberg MD

## 2023-12-15 NOTE — PROGRESS NOTES
SUBJECTIVE:   Doug is a 78 year old, presenting for the following:  Physical        12/15/2023    10:07 AM   Additional Questions   Roomed by cygi   Accompanied by self         12/15/2023    10:07 AM   Patient Reported Additional Medications   Patient reports taking the following new medications none       Are you in the first 12 months of your Medicare coverage?  No    HPI    Today's PHQ-2 Score:       12/15/2023    10:02 AM   PHQ-2 (  [employer])   Q1: Little interest or pleasure in doing things 0   Q2: Feeling down, depressed or hopeless 0   PHQ-2 Score 0   Q1: Little interest or pleasure in doing things Not at all   Q2: Feeling down, depressed or hopeless Not at all   PHQ-2 Score 0           Have you ever done Advance Care Planning? (For example, a Health Directive, POLST, or a discussion with a medical provider or your loved ones about your wishes): { :492840}    {Hearing Test Done (Optional):678702}   Fall risk  { :723659}  {If any of the above assessments are answered yes, consider ordering appropriate referrals (Optional):188746}  Cognitive Screening { :225648}    {Do you have sleep apnea, excessive snoring or daytime drowsiness? (Optional):998826}    Reviewed and updated as needed this visit by clinical staff   Tobacco  Allergies  Meds              Reviewed and updated as needed this visit by Provider                 Social History     Tobacco Use    Smoking status: Former     Types: Cigarettes     Quit date: 3/18/1980     Years since quittin.7    Smokeless tobacco: Never    Tobacco comments:     Quit many years ago   Substance Use Topics    Alcohol use: Yes     Comment: occasional      {Rooming staff  Click this link to complete the Prescreen if response below is not for today's visit  Alcohol Use Prescreen >3 drinks/day or > 7 drinks/week.  If the prescreen question answer is YES, complete the full AUDIT  :001083}        2023    10:09 AM   Alcohol Use   Prescreen: >3 drinks/day or >7  drinks/week? No   {add AUDIT responses (Optional) (A score of 7 for adult men is an indication of hazardous drinking; a score of 8 or more is an indication of an alcohol use disorder.  A score of 7 or more for adult women is an indication of hazardous drinking or an alchohol use disorder):264845}  Do you have a current opioid prescription? { :436655}  Do you use any other controlled substances or medications that are not prescribed by a provider? {Substance Use :995643}  {Provider  If there are gaps in the social history shown above, please follow the link and refresh the note Link to Social and Substance History :175843}    {Outside tests to abstract? (Optional):400474}    {additional problems to add (Optional):923923}    Current providers sharing in care for this patient include: {Rooming staff:  Please update Care Team from storyboard, refresh this note and then delete this statement}  Patient Care Team:  Liliam Weinberg MD as PCP - General (Family Practice)  Deirdre Dietz MD as MD (Colon and Rectal Surgery)  Jorge Taylor MD as MD (Orthopedics)  Johnna Ferreira MD as MD (Nephrology)  Martina Polanco MD as MD (Otolaryngology)  Liliam Weinberg MD as Assigned PCP  Conrad Luna [facility] as Pharmacist (Pharmacist)    The following health maintenance items are reviewed in Epic and correct as of today:  Health Maintenance   Topic Date Due    RSV VACCINE (Pregnancy & 60+) (1 - 1-dose 60+ series) Never done    LIPID  12/14/2023    MEDICARE ANNUAL WELLNESS VISIT  12/14/2023    COLORECTAL CANCER SCREENING  01/08/2024    BMP  06/27/2024    MICROALBUMIN  06/27/2024    FALL RISK ASSESSMENT  12/15/2024    ADVANCE CARE PLANNING  12/14/2027    DTAP/TDAP/TD IMMUNIZATION (2 - Td or Tdap) 05/03/2031    HEPATITIS C SCREENING  Completed    PHQ-2 (once per calendar year)  Completed    INFLUENZA VACCINE  Completed    Pneumococcal Vaccine: 65+ Years  Completed    URINALYSIS  Completed    ZOSTER IMMUNIZATION  " Completed    COVID-19 Vaccine  Completed    IPV IMMUNIZATION  Aged Out    HPV IMMUNIZATION  Aged Out    MENINGITIS IMMUNIZATION  Aged Out    RSV MONOCLONAL ANTIBODY  Aged Out     {Chronicprobdata (optional):711874}  {Decision Support (Optional):239573}        Review of Systems  {ROS COMP (Optional):933499}    OBJECTIVE:   BP (!) 155/87 (BP Location: Left arm, Patient Position: Sitting, Cuff Size: Adult Large)   Pulse 68   Temp 98  F (36.7  C)   Resp 19   Ht 2.007 m (6' 7\")   Wt 125.2 kg (276 lb)   SpO2 97%   BMI 31.09 kg/m   Estimated body mass index is 31.09 kg/m  as calculated from the following:    Height as of this encounter: 2.007 m (6' 7\").    Weight as of this encounter: 125.2 kg (276 lb).  Physical Exam  {Exam (Optional) :943071}    {Diagnostic Test Results (Optional):200616}    ASSESSMENT / PLAN:   {Diag Picklist:517254}    {Patient advised of split billing (Optional):865223}      COUNSELING:  {Medicare Counselin}      BMI:   Estimated body mass index is 31.09 kg/m  as calculated from the following:    Height as of this encounter: 2.007 m (6' 7\").    Weight as of this encounter: 125.2 kg (276 lb).   {Weight Management Plan needed for ACO:615398}      He reports that he quit smoking about 43 years ago. He has never used smokeless tobacco.      Appropriate preventive services were discussed with this patient, including applicable screening as appropriate for fall prevention, nutrition, physical activity, Tobacco-use cessation, weight loss and cognition.  Checklist reviewing preventive services available has been given to the patient.    Reviewed patients plan of care and provided an AVS. The {CarePlan:274591} for Doug meets the Care Plan requirement. This Care Plan has been established and reviewed with the {PATIENT, FAMILY MEMBER, CAREGIVER:462511}.    {Counseling Resources  [place] Preventive Services Task Force  Cholesterol Screening  Health diet/nutrition  Pooled Cohorts Equation " Calculator  USDA's MyPlate  ASA Prophylaxis  Lung CA Screening  Osteoporosis prevention/bone health :433251}  {Prostate Cancer Screening  Consider for men 55-69 per guidance from USPSTF :240410}    Liliam Weinberg MD  [facility]    Identified Health Risks:  {Medicare required documentation of substance and opioid use disorders screening :752981}

## 2023-12-15 NOTE — PROGRESS NOTES
SUBJECTIVE:   Doug is a 78 year old, presenting for the following:  Physical    {(!) Visit Details have not yet been documented.  Please enter Visit Details and then use this list to pull in documentation. (Optional):272513}    Are you in the first 12 months of your Medicare coverage?  { :828714}    HPI    Today's PHQ-2 Score:       12/15/2023    10:02 AM   PHQ-2 (  [employer])   Q1: Little interest or pleasure in doing things 0   Q2: Feeling down, depressed or hopeless 0   PHQ-2 Score 0   Q1: Little interest or pleasure in doing things Not at all   Q2: Feeling down, depressed or hopeless Not at all   PHQ-2 Score 0           Have you ever done Advance Care Planning? (For example, a Health Directive, POLST, or a discussion with a medical provider or your loved ones about your wishes): { :055114}    {Hearing Test Done (Optional):224316}   Fall risk  { :454970}  {If any of the above assessments are answered yes, consider ordering appropriate referrals (Optional):074302}  Cognitive Screening { :225064}    {Do you have sleep apnea, excessive snoring or daytime drowsiness? (Optional):760850}    Reviewed and updated as needed this visit by clinical staff                  Reviewed and updated as needed this visit by Provider                 Social History     Tobacco Use    Smoking status: Former     Types: Cigarettes     Quit date: 3/18/1980     Years since quittin.7    Smokeless tobacco: Never    Tobacco comments:     Quit many years ago   Substance Use Topics    Alcohol use: Yes     Comment: occasional      {Rooming staff  Click this link to complete the Prescreen if response below is not for today's visit  Alcohol Use Prescreen >3 drinks/day or > 7 drinks/week.  If the prescreen question answer is YES, complete the full AUDIT  :723593}        2023    10:09 AM   Alcohol Use   Prescreen: >3 drinks/day or >7 drinks/week? No   {add AUDIT responses (Optional) (A score of 7 for adult men is an indication of  hazardous drinking; a score of 8 or more is an indication of an alcohol use disorder.  A score of 7 or more for adult women is an indication of hazardous drinking or an alchohol use disorder):815136}  Do you have a current opioid prescription? { :493691}  Do you use any other controlled substances or medications that are not prescribed by a provider? {Substance Use :759642}  {Provider  If there are gaps in the social history shown above, please follow the link and refresh the note Link to Social and Substance History :721298}    {Outside tests to abstract? (Optional):470562}    {additional problems to add (Optional):631750}    Current providers sharing in care for this patient include: {Rooming staff:  Please update Care Team from storyboard, refresh this note and then delete this statement}  Patient Care Team:  Liliam Weinberg MD as PCP - General (Family Practice)  Deirdre Dietz MD as MD (Colon and Rectal Surgery)  Jorge Taylor MD as MD (Orthopedics)  Johnna Ferreira MD as MD (Nephrology)  Martina Polanco MD as MD (Otolaryngology)  Liliam Weinberg MD as Assigned PCP  Conrad Luna [facility] as Pharmacist (Pharmacist)    The following health maintenance items are reviewed in Epic and correct as of today:  Health Maintenance   Topic Date Due    RSV VACCINE (Pregnancy & 60+) (1 - 1-dose 60+ series) Never done    LIPID  12/14/2023    MEDICARE ANNUAL WELLNESS VISIT  12/14/2023    COLORECTAL CANCER SCREENING  01/08/2024    BMP  06/27/2024    MICROALBUMIN  06/27/2024    FALL RISK ASSESSMENT  12/15/2024    ADVANCE CARE PLANNING  12/14/2027    DTAP/TDAP/TD IMMUNIZATION (2 - Td or Tdap) 05/03/2031    HEPATITIS C SCREENING  Completed    PHQ-2 (once per calendar year)  Completed    INFLUENZA VACCINE  Completed    Pneumococcal Vaccine: 65+ Years  Completed    URINALYSIS  Completed    ZOSTER IMMUNIZATION  Completed    COVID-19 Vaccine  Completed    IPV IMMUNIZATION  Aged Out    HPV IMMUNIZATION  Aged  "Out    MENINGITIS IMMUNIZATION  Aged Out    RSV MONOCLONAL ANTIBODY  Aged Out     {Chronicprobdata (optional):263564}  {Decision Support (Optional):911813}        Review of Systems  {ROS COMP (Optional):180524}    OBJECTIVE:   There were no vitals taken for this visit. Estimated body mass index is 30.98 kg/m  as calculated from the following:    Height as of 23: 2.007 m (6' 7\").    Weight as of 23: 124.7 kg (275 lb).  Physical Exam  {Exam (Optional) :971936}    {Diagnostic Test Results (Optional):710531}    ASSESSMENT / PLAN:   {Diag Picklist:822536}    {Patient advised of split billing (Optional):385794}      COUNSELING:  {Medicare Counselin}      BMI:   Estimated body mass index is 30.98 kg/m  as calculated from the following:    Height as of 23: 2.007 m (6' 7\").    Weight as of 23: 124.7 kg (275 lb).   {Weight Management Plan needed for ACO:106851}      He reports that he quit smoking about 43 years ago. He has never used smokeless tobacco.      Appropriate preventive services were discussed with this patient, including applicable screening as appropriate for fall prevention, nutrition, physical activity, Tobacco-use cessation, weight loss and cognition.  Checklist reviewing preventive services available has been given to the patient.    Reviewed patients plan of care and provided an AVS. The {CarePlan:373533} for Doug meets the Care Plan requirement. This Care Plan has been established and reviewed with the {PATIENT, FAMILY MEMBER, CAREGIVER:179374}.    {Counseling Resources  [place] Preventive Services Task Force  Cholesterol Screening  Health diet/nutrition  Pooled Cohorts Equation Calculator  USDA's MyPlate  ASA Prophylaxis  Lung CA Screening  Osteoporosis prevention/bone health :458782}  {Prostate Cancer Screening  Consider for men 55-69 per guidance from USPSTF :753188}    Liliam Weinberg MD  [facility]    Identified Health Risks:  {Medicare required " documentation of substance and opioid use disorders screening :501250}

## 2024-01-12 DIAGNOSIS — R09.89 THROAT CLEARING: ICD-10-CM

## 2024-01-15 RX ORDER — AZELASTINE HYDROCHLORIDE 137 UG/1
1 SPRAY, METERED NASAL 2 TIMES DAILY
Qty: 30 ML | Refills: 0 | Status: SHIPPED | OUTPATIENT
Start: 2024-01-15 | End: 2024-05-28

## 2024-01-15 NOTE — TELEPHONE ENCOUNTER
"Request for medication refill:  azelastine (ASTELIN) 0.1 % nasal spray     Providers if patient needs an appointment and you are willing to give a one month supply please refill for one month and  send a letter/MyChart using \".SMILLIMITEDREFILL\" .smillimited and route chart to \"P SMI \" (Giving one month refill in non controlled medications is strongly recommended before denial)    If refill has been denied, meaning absolutely no refills without visit, please complete the smart phrase \".smirxrefuse\" and route it to the \"P SMI MED REFILLS\"  pool to inform the patient and the pharmacy.    Mayda South, [facility]      "

## 2024-02-05 ENCOUNTER — OFFICE VISIT (OUTPATIENT)
Dept: FAMILY MEDICINE | Facility: CLINIC | Age: 79
End: 2024-02-05
Payer: COMMERCIAL

## 2024-02-05 VITALS
BODY MASS INDEX: 31.24 KG/M2 | HEIGHT: 78 IN | WEIGHT: 270 LBS | HEART RATE: 60 BPM | DIASTOLIC BLOOD PRESSURE: 95 MMHG | OXYGEN SATURATION: 98 % | SYSTOLIC BLOOD PRESSURE: 158 MMHG | RESPIRATION RATE: 14 BRPM | TEMPERATURE: 98.4 F

## 2024-02-05 DIAGNOSIS — K02.9 TOOTH DECAY: Primary | ICD-10-CM

## 2024-02-05 DIAGNOSIS — K14.0 TONGUE ULCER: ICD-10-CM

## 2024-02-05 DIAGNOSIS — Z29.11 NEED FOR VACCINATION AGAINST RESPIRATORY SYNCYTIAL VIRUS: ICD-10-CM

## 2024-02-05 PROCEDURE — 99214 OFFICE O/P EST MOD 30 MIN: CPT | Performed by: FAMILY MEDICINE

## 2024-02-05 RX ORDER — RESPIRATORY SYNCYTIAL VIRUS VACCINE 120MCG/0.5
0.5 KIT INTRAMUSCULAR ONCE
Qty: 1 EACH | Refills: 0 | Status: CANCELLED | OUTPATIENT
Start: 2024-02-05 | End: 2024-02-05

## 2024-02-05 NOTE — PROGRESS NOTES
Assessment & Plan     Tooth decay  He denies any dry mouth and is no longer noticing much GERD. He also recalls us doing EGD awhile ago that never demonstrated GERD.  And, he has been on a PPI for awhile and that has not helped his teeth.   Consider doing Sjogren's testing just in case  I also reviewed the literature on any systemic illnesses linked to rapid tooth decay. He has pre-diabetes and that has been getting better - not worse. So that is not the cause. He is not on amlodipine or any seizure meds. He has lost some weight consistently for the last 10 years but intentionally. Cancers can be associated with tooth decay. His labs have all been negative. No PSA recently.  Colonoscopy is due - has had polyps. Will review options with him.    Will see the periodontist in 3 months.     Tongue ulcer  Seems to be right where his teeth are sharp. Plan to see if can cover with wax or other substance to see if that helps heal the ulcer    Need for vaccination against respiratory syncytial virus      I spent a total of 32 minutes on the day of the visit.   Time spent by me doing chart review, history and exam, documentation and further activities per the note            No follow-ups on file.    Subjective   Doug is a 78 year old, presenting for the following health issues:  Consult (Pt has a spot on his tongue.)    HPI     Dental:  Changed to a new dentist 2 years -   He is having a lot of crowns. Decay is accelerating. Or wondering about his meds.   No change since the omeprazole started.       GERD:  Has been on a PPI since 2012 and intermittently 3093-4974 and then on it regularly.   He feels like the GERD is better since did the throat therapy.   Taking Vicks for cough - but is staying away from that  Takes a lozenge - Vicks Menthol. Vapocool. At one point was using 6 a day and now cutting back.     Tongue:   Over sharp area     ROS:   No joint issues, headaches, rashes, no other symptoms.    Wt Readings from Last  "4 Encounters:   02/05/24 122.5 kg (270 lb)   12/15/23 125.2 kg (276 lb)   11/21/23 124.7 kg (275 lb)   06/27/23 126.6 kg (279 lb 3.2 oz)                           Objective    BP (!) 158/95   Pulse 60   Temp 98.4  F (36.9  C)   Resp 14   Ht 2.007 m (6' 7\")   Wt 122.5 kg (270 lb)   SpO2 98%   BMI 30.42 kg/m    Body mass index is 30.42 kg/m .  Physical Exam   OP: Some teeth are fine and others, particularly the front teeth are either capped or being worked on. Sharp teeth.    Tongue - left tip with small, shallow ulceration without any skin changes. Rest of tongue normal.   Neck - supple without adenopathy              Signed Electronically by: Liliam Weinberg MD    "

## 2024-02-06 NOTE — PATIENT INSTRUCTIONS
Patient Education   Here is the plan from today's visit    1. Tooth decay  I looked into the literature. Diabetes is commonly associated with poor teeth but your testing a month ago was fine... so that is not it.   You are not on medications that can do this  I will call you about other options     2. Tongue ulcer  See if you can cover the sharp parts of your teeth to see if this heals.               Please call or return to clinic if your symptoms don't go away.    Follow up plan  No follow-ups on file.    Thank you for coming to [facility] today.  Lab Testing:  **If you had lab testing today and your results are reassuring or normal they will be mailed to you or sent through VirtualQube within 7 days.   **If the lab tests need quick action we will call you with the results.  **If you are having labs done on a different day, please call [phone] to schedule at [facility] or [phone] for other [facility] locations. Labs do not offer walk-in appointments.  The phone number we will call with results is # [phone] (home) . If this is not the best number please call our clinic and change the number.  Medication Refills:  If you need any refills please call your pharmacy and they will contact us.   If you need to  your refill at a new pharmacy, please contact the new pharmacy directly. The new pharmacy will help you get your medications transferred faster.   Scheduling:  If you have any concerns about today's visit or wish to schedule another appointment please call our office during normal business hours [phone] (8-5:00 M-F). If you can no longer make a scheduled visit, please cancel via VirtualQube or call us to cancel.   If a referral was made to an [facility] specialty provider and you do not get a call from central scheduling, please refer to directions on your visit summary or call our office during normal business hours for assistance.   If a Mammogram was ordered  for you at the [facility] call [phone] to schedule or change your appointment.  If you had an XRay/CT/Ultrasound/MRI ordered the number is [phone] to schedule or change your radiology appointment.   [facility] has limited ultrasound appointments available on Wednesdays, if you would like your ultrasound at [facility], please call [phone] to schedule.   Medical Concerns:  If you have urgent medical concerns please call [phone] at any time of the day.    Liliam Weinberg MD

## 2024-03-04 DIAGNOSIS — I10 HYPERTENSION, ESSENTIAL: ICD-10-CM

## 2024-03-04 RX ORDER — LOSARTAN POTASSIUM AND HYDROCHLOROTHIAZIDE 25; 100 MG/1; MG/1
1 TABLET ORAL DAILY
Qty: 90 TABLET | Refills: 2 | Status: SHIPPED | OUTPATIENT
Start: 2024-03-04

## 2024-03-04 NOTE — TELEPHONE ENCOUNTER
"Request for medication refill:    losartan-hydrochlorothiazide (HYZAAR) 100-25 MG tablet     Providers if patient needs an appointment and you are willing to give a one month supply please refill for one month and  send a letter/MyChart using \".SMILLIMITEDREFILL\" .smillimited and route chart to \"P SMI \" (Giving one month refill in non controlled medications is strongly recommended before denial)    If refill has been denied, meaning absolutely no refills without visit, please complete the smart phrase \".smirxrefuse\" and route it to the \"P SMI MED REFILLS\"  pool to inform the patient and the pharmacy.    Juanita Mckee MA     "

## 2024-05-27 DIAGNOSIS — R09.89 THROAT CLEARING: ICD-10-CM

## 2024-05-28 RX ORDER — AZELASTINE HYDROCHLORIDE 137 UG/1
1 SPRAY, METERED NASAL 2 TIMES DAILY
Qty: 30 ML | Refills: 0 | Status: SHIPPED | OUTPATIENT
Start: 2024-05-28 | End: 2024-10-01

## 2024-07-21 DIAGNOSIS — I10 ESSENTIAL HYPERTENSION: ICD-10-CM

## 2024-07-22 RX ORDER — ATORVASTATIN CALCIUM 40 MG/1
40 TABLET, FILM COATED ORAL DAILY
Qty: 90 TABLET | Refills: 2 | Status: SHIPPED | OUTPATIENT
Start: 2024-07-22

## 2024-07-22 NOTE — TELEPHONE ENCOUNTER
"Request for medication refill:  atorvastatin (LIPITOR) 40 MG tablet     Providers if patient needs an appointment and you are willing to give a one month supply please refill for one month and  send a letter/MyChart using \".SMILLIMITEDREFILL\" .smillimited and route chart to \"P SMI \" (Giving one month refill in non controlled medications is strongly recommended before denial)    If refill has been denied, meaning absolutely no refills without visit, please complete the smart phrase \".smirxrefuse\" and route it to the \"P SMI MED REFILLS\"  pool to inform the patient and the pharmacy.    Mayda South, CMA      "

## 2024-10-01 DIAGNOSIS — R09.89 THROAT CLEARING: ICD-10-CM

## 2024-10-01 RX ORDER — AZELASTINE HYDROCHLORIDE 137 UG/1
1 SPRAY, METERED NASAL 2 TIMES DAILY
Qty: 30 ML | Refills: 0 | Status: SHIPPED | OUTPATIENT
Start: 2024-10-01

## 2024-10-01 NOTE — TELEPHONE ENCOUNTER
"Request for medication refill:  Azelastine HCl 137 MCG/SPRAY SOLN     Providers if patient needs an appointment and you are willing to give a one month supply please refill for one month and  send a letter/MyChart using \".SMILLIMITEDREFILL\" .smillimited and route chart to \"P [facility] \" (Giving one month refill in non controlled medications is strongly recommended before denial)    If refill has been denied, meaning absolutely no refills without visit, please complete the smart phrase \".smirxrefuse\" and route it to the \"P [facility] MED REFILLS\"  pool to inform the patient and the pharmacy.    Mayda South, CMA      "

## 2024-11-01 DIAGNOSIS — K21.9 GASTROESOPHAGEAL REFLUX DISEASE WITHOUT ESOPHAGITIS: ICD-10-CM

## 2024-11-01 NOTE — TELEPHONE ENCOUNTER
"Request for medication refill:  omeprazole (PRILOSEC) 20 MG DR capsule     Providers if patient needs an appointment and you are willing to give a one month supply please refill for one month and  send a letter/MyChart using \".SMILLIMITEDREFILL\" .smillimited and route chart to \"P [facility] \" (Giving one month refill in non controlled medications is strongly recommended before denial)    If refill has been denied, meaning absolutely no refills without visit, please complete the smart phrase \".smirxrefuse\" and route it to the \"P [facility] MED REFILLS\"  pool to inform the patient and the pharmacy.    Mayda South, CMA      "

## 2024-11-29 DIAGNOSIS — I10 HYPERTENSION, ESSENTIAL: ICD-10-CM

## 2024-12-02 RX ORDER — LOSARTAN POTASSIUM AND HYDROCHLOROTHIAZIDE 25; 100 MG/1; MG/1
1 TABLET ORAL DAILY
Qty: 90 TABLET | Refills: 0 | Status: SHIPPED | OUTPATIENT
Start: 2024-12-02

## 2024-12-02 NOTE — TELEPHONE ENCOUNTER
"Request for medication refill:    losartan-hydrochlorothiazide (HYZAAR) 100-25 MG tablet    Providers if patient needs an appointment and you are willing to give a one month supply please refill for one month and  send a letter/MyChart using \".SMILLIMITEDREFILL\" .smillimited and route chart to \"P [facility] \" (Giving one month refill in non controlled medications is strongly recommended before denial)    If refill has been denied, meaning absolutely no refills without visit, please complete the smart phrase \".smirxrefuse\" and route it to the \"P SMI MED REFILLS\"  pool to inform the patient and the pharmacy.    Carmen Vogel MA      "

## 2024-12-26 SDOH — HEALTH STABILITY: PHYSICAL HEALTH: ON AVERAGE, HOW MANY MINUTES DO YOU ENGAGE IN EXERCISE AT THIS LEVEL?: 40 MIN

## 2024-12-26 SDOH — HEALTH STABILITY: PHYSICAL HEALTH: ON AVERAGE, HOW MANY DAYS PER WEEK DO YOU ENGAGE IN MODERATE TO STRENUOUS EXERCISE (LIKE A BRISK WALK)?: 3 DAYS

## 2024-12-26 ASSESSMENT — SOCIAL DETERMINANTS OF HEALTH (SDOH): HOW OFTEN DO YOU GET TOGETHER WITH FRIENDS OR RELATIVES?: TWICE A WEEK

## 2024-12-30 ENCOUNTER — OFFICE VISIT (OUTPATIENT)
Dept: FAMILY MEDICINE | Facility: CLINIC | Age: 79
End: 2024-12-30
Payer: COMMERCIAL

## 2024-12-30 VITALS
TEMPERATURE: 97.9 F | DIASTOLIC BLOOD PRESSURE: 77 MMHG | WEIGHT: 275 LBS | HEART RATE: 75 BPM | OXYGEN SATURATION: 95 % | BODY MASS INDEX: 32.47 KG/M2 | SYSTOLIC BLOOD PRESSURE: 136 MMHG | HEIGHT: 77 IN | RESPIRATION RATE: 15 BRPM

## 2024-12-30 DIAGNOSIS — N18.2 CHRONIC KIDNEY DISEASE, STAGE II (MILD): ICD-10-CM

## 2024-12-30 DIAGNOSIS — K21.9 GASTROESOPHAGEAL REFLUX DISEASE WITHOUT ESOPHAGITIS: ICD-10-CM

## 2024-12-30 DIAGNOSIS — I10 ESSENTIAL HYPERTENSION: ICD-10-CM

## 2024-12-30 DIAGNOSIS — Z29.11 NEED FOR VACCINATION AGAINST RESPIRATORY SYNCYTIAL VIRUS: ICD-10-CM

## 2024-12-30 DIAGNOSIS — Z00.00 ENCOUNTER FOR MEDICARE ANNUAL WELLNESS EXAM: Primary | ICD-10-CM

## 2024-12-30 DIAGNOSIS — I10 HYPERTENSION, ESSENTIAL: ICD-10-CM

## 2024-12-30 DIAGNOSIS — Z12.11 SCREEN FOR COLON CANCER: ICD-10-CM

## 2024-12-30 DIAGNOSIS — R09.89 THROAT CLEARING: ICD-10-CM

## 2024-12-30 DIAGNOSIS — E53.8 VITAMIN B12 DEFICIENCY (NON ANEMIC): ICD-10-CM

## 2024-12-30 DIAGNOSIS — D12.6 BENIGN NEOPLASM OF COLON, UNSPECIFIED PART OF COLON: ICD-10-CM

## 2024-12-30 DIAGNOSIS — E78.5 HYPERLIPIDEMIA LDL GOAL <100: ICD-10-CM

## 2024-12-30 LAB
ANION GAP SERPL CALCULATED.3IONS-SCNC: 12 MMOL/L (ref 7–15)
BUN SERPL-MCNC: 11.7 MG/DL (ref 8–23)
CALCIUM SERPL-MCNC: 9.4 MG/DL (ref 8.8–10.4)
CHLORIDE SERPL-SCNC: 100 MMOL/L (ref 98–107)
CHOLEST SERPL-MCNC: 115 MG/DL
CREAT SERPL-MCNC: 0.9 MG/DL (ref 0.67–1.17)
CREAT UR-MCNC: 251 MG/DL
EGFRCR SERPLBLD CKD-EPI 2021: 88 ML/MIN/1.73M2
FASTING STATUS PATIENT QL REPORTED: YES
FASTING STATUS PATIENT QL REPORTED: YES
GLUCOSE SERPL-MCNC: 135 MG/DL (ref 70–99)
HCO3 SERPL-SCNC: 27 MMOL/L (ref 22–29)
HDLC SERPL-MCNC: 37 MG/DL
LDLC SERPL CALC-MCNC: 45 MG/DL
MICROALBUMIN UR-MCNC: 278 MG/L
MICROALBUMIN/CREAT UR: 110.76 MG/G CR (ref 0–17)
NONHDLC SERPL-MCNC: 78 MG/DL
POTASSIUM SERPL-SCNC: 3.8 MMOL/L (ref 3.4–5.3)
SODIUM SERPL-SCNC: 139 MMOL/L (ref 135–145)
TRIGL SERPL-MCNC: 165 MG/DL

## 2024-12-30 RX ORDER — AZELASTINE HYDROCHLORIDE 137 UG/1
1 SPRAY, METERED NASAL 2 TIMES DAILY
Qty: 30 ML | Refills: 5 | Status: SHIPPED | OUTPATIENT
Start: 2024-12-30

## 2024-12-30 RX ORDER — ATORVASTATIN CALCIUM 40 MG/1
40 TABLET, FILM COATED ORAL DAILY
Qty: 90 TABLET | Refills: 3 | Status: SHIPPED | OUTPATIENT
Start: 2024-12-30

## 2024-12-30 RX ORDER — LOSARTAN POTASSIUM AND HYDROCHLOROTHIAZIDE 25; 100 MG/1; MG/1
1 TABLET ORAL DAILY
Qty: 90 TABLET | Refills: 3 | Status: SHIPPED | OUTPATIENT
Start: 2024-12-30

## 2024-12-30 ASSESSMENT — PAIN SCALES - GENERAL: PAINLEVEL_OUTOF10: NO PAIN (0)

## 2024-12-30 NOTE — PROGRESS NOTES
"Preventive Care Visit  [facility] KELI Weinberg MD, Family Medicine  Dec 30, 2024      Assessment & Plan     Encounter for Medicare annual wellness exam  Doing well and up to date.     Throat clearing  Refill   - azelastine 137 MCG/SPRAY SOLN; [place] 1 spray into both nostrils 2 times daily.      Hypertension, essential  Controlled. Continue meds   - losartan-hydrochlorothiazide (HYZAAR) 100-25 MG tablet; Take 1 tablet by mouth daily.    Gastroesophageal reflux disease without esophagitis  Not able to manage off PPI. Refilled   - omeprazole (PRILOSEC) 20 MG DR capsule; Take 1 capsule (20 mg) by mouth daily.    Need for vaccination against respiratory syncytial virus  Reminded     Screen for colon cancer  Long discussion around value of ongoing screening. Reviewed with him that he has had lots of polyps, grew an 8mm one 6 years after his last one, so I do worry that if he does not continue screening, that he is at higher risk of developing colon cancer. His last scope had multiple small adenomas removed as well. He agrees that overall he feels well and so the colonoscopy at this point is reasonable.     Chronic kidney disease, stage II (mild)  Ongoing monitoring   - Albumin Random Urine Quantitative with Creat Ratio; Future  - BASIC METABOLIC PANEL; Future  - Albumin Random Urine Quantitative with Creat Ratio  - BASIC METABOLIC PANEL    Hyperlipidemia LDL goal <100  Ongoing monitoring   - Lipid panel reflex to direct LDL Non-fasting; Future  - Lipid panel reflex to direct LDL Non-fasting    Benign neoplasm of colon, unspecified part of colon  As above   - Colonoscopy Screening  Referral; Future    Essential hypertension  Managed well.   - atorvastatin (LIPITOR) 40 MG tablet; Take 1 tablet (40 mg) by mouth daily.            BMI  Estimated body mass index is 32.24 kg/m  as calculated from the following:    Height as of this encounter: 1.967 m (6' 5.44\").    Weight as of this encounter: " 124.7 kg (275 lb).       Counseling  Appropriate preventive services were addressed with this patient via screening, questionnaire, or discussion as appropriate for fall prevention, nutrition, physical activity, Tobacco-use cessation, social engagement, weight loss and cognition.  Checklist reviewing preventive services available has been given to the patient.  Reviewed patient's diet, addressing concerns and/or questions.   He is at risk for lack of exercise and has been provided with information to increase physical activity for the benefit of his well-being.           No follow-ups on file.    Subjective   Doug is a 79 year old, presenting for the following:  Physical (Doug thinks he is doing well. )        12/30/2024    10:04 AM   Additional Questions   Roomed by Guerline   Accompanied by self         12/30/2024    Information    services provided? No           HPI    Had all his teeth pulled and is now waiting a few months before he gets his dentures. Dentist never able to figure out why they were doing so poorly all of a sudden.   Wt Readings from Last 4 Encounters:   12/30/24 124.7 kg (275 lb)   02/05/24 122.5 kg (270 lb)   12/15/23 125.2 kg (276 lb)   11/21/23 124.7 kg (275 lb)     Throat clearing OK - sometimes gets worse and then better.     Accupuncture continues with peripheral neuropathy symptoms.     Thinks balance is OK - has done balance therapy and has a stick and has not used it.  Is very careful.            Health Care Directive  Patient has a Health Care Directive on file  Advance care planning document is on file and is current.      12/26/2024   General Health   How would you rate your overall physical health? Good   Feel stress (tense, anxious, or unable to sleep) Not at all         12/26/2024   Nutrition   Diet: Regular (no restrictions)         12/26/2024   Exercise   Days per week of moderate/strenous exercise 3 days   Average minutes spent exercising at this level 40 min          12/26/2024   Social Factors   Frequency of gathering with friends or relatives Twice a week   Worry food won't last until get money to buy more No   Food not last or not have enough money for food? No   Do you have housing? (Housing is defined as stable permanent housing and does not include staying ouside in a car, in a tent, in an abandoned building, in an overnight shelter, or couch-surfing.) Yes   Are you worried about losing your housing? No   Lack of transportation? No   Unable to get utilities (heat,electricity)? No         12/26/2024   Fall Risk   Fallen 2 or more times in the past year? No   Trouble with walking or balance? No          12/26/2024   Activities of Daily Living- Home Safety   Needs help with the following daily activites None of the above   Safety concerns in the home None of the above         12/26/2024   Dental   Dentist two times every year? Yes         12/26/2024   Hearing Screening   Hearing concerns? None of the above         12/26/2024   Driving Risk Screening   Patient/family members have concerns about driving No         12/26/2024   General Alertness/Fatigue Screening   Have you been more tired than usual lately? No         12/26/2024   Urinary Incontinence Screening   Bothered by leaking urine in past 6 months No         12/26/2024   TB Screening   Were you born outside of the [place]? No         Today's PHQ-2 Score:       12/30/2024     9:47 AM   PHQ-2 ( 1999 [employer])   Q1: Little interest or pleasure in doing things 0   Q2: Feeling down, depressed or hopeless 0   PHQ-2 Score 0    Q1: Little interest or pleasure in doing things Not at all   Q2: Feeling down, depressed or hopeless Not at all   PHQ-2 Score 0       Patient-reported           12/26/2024   Substance Use   Alcohol more than 3/day or more than 7/wk No   Do you have a current opioid prescription? No   How severe/bad is pain from 1 to 10? 0/10 (No Pain)   Do you use any other substances recreationally? No     Social  History     Tobacco Use    Smoking status: Former     Current packs/day: 0.00     Types: Cigarettes     Quit date: 3/18/1980     Years since quittin.8    Smokeless tobacco: Never    Tobacco comments:     Quit many years ago   Substance Use Topics    Alcohol use: Yes     Comment: occasional     Drug use: No       ASCVD Risk   The ASCVD Risk score (Eri SPENCER, et al., 2019) failed to calculate for the following reasons:    The valid total cholesterol range is 130 to 320 mg/dL            Reviewed and updated as needed this visit by Provider                      Current providers sharing in care for this patient include:  Patient Care Team:  Liliam Weinberg MD as PCP - General (Family Practice)  Deirdre Dietz MD as MD (Colon and Rectal Surgery)  Jorge Taylor MD as MD (Orthopedics)  Johnna Ferreira MD as MD (Nephrology)  Martina Polanco MD as MD (Otolaryngology)  Liliam Weinberg MD as Assigned PCP  Conrad Luna RPH as Pharmacist (Pharmacist)    The following health maintenance items are reviewed in Epic and correct as of today:  Health Maintenance   Topic Date Due    RSV VACCINE (1 - 1-dose 75+ series) Never done    COLORECTAL CANCER SCREENING  2024    MICROALBUMIN  2024    BMP  12/15/2024    LIPID  12/15/2024    MEDICARE ANNUAL WELLNESS VISIT  12/15/2024    FALL RISK ASSESSMENT  2025    GLUCOSE  12/15/2026    ADVANCE CARE PLANNING  12/15/2028    DTAP/TDAP/TD IMMUNIZATION (2 - Td or Tdap) 2031    HEPATITIS C SCREENING  Completed    PHQ-2 (once per calendar year)  Completed    INFLUENZA VACCINE  Completed    Pneumococcal Vaccine: 50+ Years  Completed    URINALYSIS  Completed    ZOSTER IMMUNIZATION  Completed    COVID-19 Vaccine  Completed    HPV IMMUNIZATION  Aged Out    MENINGITIS IMMUNIZATION  Aged Out    RSV MONOCLONAL ANTIBODY  Aged Out            Objective    Exam  /77   Pulse 75   Temp 97.9  F (36.6  C) (Oral)   Resp 15   Ht 1.967 m  "(6' 5.44\")   Wt 124.7 kg (275 lb)   SpO2 95%   BMI 32.24 kg/m     Estimated body mass index is 32.24 kg/m  as calculated from the following:    Height as of this encounter: 1.967 m (6' 5.44\").    Weight as of this encounter: 124.7 kg (275 lb).    Physical Exam  GENERAL: alert and no distress  EYES: Eyes grossly normal to inspection, PERRL and conjunctivae and sclerae normal  HENT: ear canals and TM's normal, nose and mouth without ulcers or lesions  NECK: no adenopathy, no asymmetry, masses, or scars  RESP: lungs clear to auscultation - no rales, rhonchi or wheezes  CV: regular rate and rhythm, normal S1 S2, no S3 or S4, no murmur, click or rub, no peripheral edema  ABDOMEN: soft, nontender, no hepatosplenomegaly, no masses and bowel sounds normal  MS: no gross musculoskeletal defects noted, no edema. +varicose veins.  DTR - 1+ UE        12/30/2024   Mini Cog   Clock Draw Score 2 Normal    2 Normal   3 Item Recall 3 objects recalled   Mini Cog Total Score 5       Multiple values from one day are sorted in reverse-chronological order              Signed Electronically by: Liliam Weinberg MD    "

## 2024-12-30 NOTE — PATIENT INSTRUCTIONS
Patient Education   Preventive Care Advice   This is general advice given by our system to help you stay healthy. However, your care team may have specific advice just for you. Please talk to your care team about your preventive care needs.  Nutrition  Eat 5 or more servings of fruits and vegetables each day.  Try wheat bread, brown rice and whole grain pasta (instead of white bread, rice, and pasta).  Get enough calcium and vitamin D. Check the label on foods and aim for 100% of the RDA (recommended daily allowance).  Lifestyle  Exercise at least 150 minutes each week  (30 minutes a day, 5 days a week).  Do muscle strengthening activities 2 days a week. These help control your weight and prevent disease.  No smoking.  Wear sunscreen to prevent skin cancer.  Have a dental exam and cleaning every 6 months.  Yearly exams  See your health care team every year to talk about:  Any changes in your health.  Any medicines your care team has prescribed.  Preventive care, family planning, and ways to prevent chronic diseases.  Shots (vaccines)   HPV shots (up to age 26), if you've never had them before.  Hepatitis B shots (up to age 59), if you've never had them before.  COVID-19 shot: Get this shot when it's due.  Flu shot: Get a flu shot every year.  Tetanus shot: Get a tetanus shot every 10 years.  Pneumococcal, hepatitis A, and RSV shots: Ask your care team if you need these based on your risk.  Shingles shot (for age 50 and up)  General health tests  Diabetes screening:  Starting at age 35, Get screened for diabetes at least every 3 years.  If you are younger than age 35, ask your care team if you should be screened for diabetes.  Cholesterol test: At age 39, start having a cholesterol test every 5 years, or more often if advised.  Bone density scan (DEXA): At age 50, ask your care team if you should have this scan for osteoporosis (brittle bones).  Hepatitis C: Get tested at least once in your life.  STIs (sexually  transmitted infections)  Before age 24: Ask your care team if you should be screened for STIs.  After age 24: Get screened for STIs if you're at risk. You are at risk for STIs (including HIV) if:  You are sexually active with more than one person.  You don't use condoms every time.  You or a partner was diagnosed with a sexually transmitted infection.  If you are at risk for HIV, ask about PrEP medicine to prevent HIV.  Get tested for HIV at least once in your life, whether you are at risk for HIV or not.  Cancer screening tests  Cervical cancer screening: If you have a cervix, begin getting regular cervical cancer screening tests starting at age 21.  Breast cancer scan (mammogram): If you've ever had breasts, begin having regular mammograms starting at age 40. This is a scan to check for breast cancer.  Colon cancer screening: It is important to start screening for colon cancer at age 45.  Have a colonoscopy test every 10 years (or more often if you're at risk) Or, ask your provider about stool tests like a FIT test every year or Cologuard test every 3 years.  To learn more about your testing options, visit:   .  For help making a decision, visit:   https://bit.ly/nz35689.  Prostate cancer screening test: If you have a prostate, ask your care team if a prostate cancer screening test (PSA) at age 55 is right for you.  Lung cancer screening: If you are a current or former smoker ages 50 to 80, ask your care team if ongoing lung cancer screenings are right for you.  For informational purposes only. Not to replace the advice of your health care provider. Copyright   2023 [employer]. All rights reserved. Clinically reviewed by the [facility]. [employer] [id number] - REV 01/24.

## 2025-01-30 ENCOUNTER — TELEPHONE (OUTPATIENT)
Dept: GASTROENTEROLOGY | Facility: CLINIC | Age: 80
End: 2025-01-30
Payer: COMMERCIAL

## 2025-01-30 NOTE — TELEPHONE ENCOUNTER
"Endoscopy Scheduling Screen    Have you had any respiratory illness or flu-like symptoms in the last 10 days?  No    What is your communication preference for Instructions and/or Bowel Prep?   MyChart    What insurance is in the chart?  Other:  [facility]     Ordering/Referring Provider: CAM MANZANO    (If ordering provider performs procedure, schedule with ordering provider unless otherwise instructed. )    BMI: Estimated body mass index is 32.24 kg/m  as calculated from the following:    Height as of 12/30/24: 1.967 m (6' 5.44\").    Weight as of 12/30/24: 124.7 kg (275 lb).     Sedation Ordered  moderate sedation.   If patient BMI > 50 do not schedule in [facility].    If patient BMI > 45 do not schedule at [facility].    Are you taking methadone or Suboxone?  NO, No RN review required.    Have you been diagnosed and are being treated for severe PTSD or severe anxiety?  NO, No RN review required.    Are you taking any prescription medications for pain 3 or more times per week?   NO, No RN review required.    Do you have a history of malignant hyperthermia?  No    (Females) Are you currently pregnant?        Have you been diagnosed or told you have pulmonary hypertension?   No    Do you have an LVAD?  No    Have you been told you have moderate to severe sleep apnea?  No.    Have you been told you have COPD, asthma, or any other lung disease?  No    Do you have any heart conditions?  No     Have you ever had or are you waiting for an organ transplant?  No. Continue scheduling, no site restrictions.    Have you had a stroke or transient ischemic attack (TIA aka \"mini stroke\" in the last 6 months?   No    Have you been diagnosed with or been told you have cirrhosis of the liver?   No.    Are you currently on dialysis?   No    Do you need assistance transferring?   No    BMI: Estimated body mass index is 32.24 kg/m  as calculated from the following:    Height as of 12/30/24: 1.967 m (6' 5.44\").    Weight as of 12/30/24: 124.7 kg (275 " lb).     Is patients BMI > 40 and scheduling location [facility]?  No    Do you take an injectable or oral medication for weight loss or diabetes (excluding insulin)?  No    Do you take the medication Naltrexone?  No    Do you take blood thinners?  No       Prep   Are you currently on dialysis or do you have chronic kidney disease?  No    Do you have a diagnosis of diabetes?  No    Do you have a diagnosis of cystic fibrosis (CF)?  No    On a regular basis do you go 3 -5 days between bowel movements?  No    BMI > 40?  No    Preferred Pharmacy:    [id number] [address] [address]  Phone: [phone] Fax: [phone]          Final Scheduling Details     Procedure scheduled  Colonoscopy    Surgeon:       Date of procedure:  3/27/25     Pre-OP / PAC:   No - Not required for this site.    Location  [facility] - [facility] - Patient preference.    Sedation   Moderate Sedation - Per order.      Patient Reminders:   You will receive a call from a Nurse to review instructions and health history.  This assessment must be completed prior to your procedure.  Failure to complete the Nurse assessment may result in the procedure being cancelled.      On the day of your procedure, please designate an adult(s) who can drive you home stay with you for the next 24 hours. The medicines used in the exam will make you sleepy. You will not be able to drive.      You cannot take public transportation, ride share services, or non-medical taxi service without a responsible caregiver.  Medical transport services are allowed with the requirement that a responsible caregiver will receive you at your destination.  We require that drivers and caregivers are confirmed prior to your procedure.

## 2025-03-12 ENCOUNTER — TELEPHONE (OUTPATIENT)
Dept: GASTROENTEROLOGY | Facility: CLINIC | Age: 80
End: 2025-03-12
Payer: COMMERCIAL

## 2025-03-12 DIAGNOSIS — Z86.0100 HISTORY OF COLONIC POLYPS: Primary | ICD-10-CM

## 2025-03-12 RX ORDER — BISACODYL 5 MG/1
TABLET, DELAYED RELEASE ORAL
Qty: 4 TABLET | Refills: 0 | Status: SHIPPED | OUTPATIENT
Start: 2025-03-12

## 2025-03-12 NOTE — TELEPHONE ENCOUNTER
Caller: Doug-Patient    Reason for Reschedule/Cancellation (please be detailed, any staff messages or encounters to note?):   Patient had oral surgery and wanted to push it out    Did you cancel or rescheduled an [facility] procedure? No.    Is screening questionnaire older than 3 months from the reschedule date.   If Yes, please complete screening questionnaire. No-1/30/25    Prior to reschedule please review:  Ordering Provider: Liliam Weinberg  Sedation Determined: Moderate  Does patient have any ASC Exclusions, please identify?: No    Notes on Cancelled Procedure:  Procedure: Lower Endoscopy [Colonoscopy]   Date: 3/27/25  Location: [facility]; [address] [place] [address], [place] [address]  Surgeon:     Rescheduled: Yes,   Procedure: Lower Endoscopy [Colonoscopy]    Date: 4/28/25   Location: [facility]; [address] [place] [address], [place] [address]   Surgeon:    Sedation Level Scheduled  Moderate ,  Reason for Sedation Level Per Order   Instructions updated and sent: Yes, Tory     Does patient need PAC or Pre -Op Rescheduled? : No

## 2025-03-12 NOTE — TELEPHONE ENCOUNTER
Attempted to contact patient in order to complete pre assessment questions.     No answer. Left a message on the patients wife's voicemail, to return call to [phone] option 3.    Callback communication sent via [employer].      Lisa Kirby LPN  Endoscopy Procedure Pre Assessment

## 2025-03-12 NOTE — TELEPHONE ENCOUNTER
Pre visit planning completed.      Procedure details:    Patient scheduled for Colonoscopy on 3/27/25.     Arrival time: 0930. Procedure time 1030    [facility]; [address] [place] [address], 5th Floor, [place] [address]. Check in location: 5th Floor.    Sedation type: Conscious sedation  -- Had CS in 2021, tolerated well. Norco in CE, ensure not taking regularly.     Pre op exam needed? No.    Indication for procedure: Hx of polyps       Chart review:     Electronic implanted devices? No    Recent diagnosis of diverticulitis within the last 6 weeks? No      Medication review:    Diabetic? Prediabetic. Not on diabetic medications.    Anticoagulants? No    Weight loss medication/injectable? No GLP-1 medication per patient's medication list. Nursing to verify with pre-assessment call.    Other medication HOLDING recommendations:  N/A      Prep for procedure:     Bowel prep recommendation: Standard Golytely. Bowel prep sent to      [id number] [place], [place] - [address] [facility] [facility]    Due to: CKD noted -- mild     Procedure information and instructions sent via [employer]         Lizbeth Basurto RN  Endoscopy Procedure Pre Assessment   [phone] option 3

## 2025-03-12 NOTE — TELEPHONE ENCOUNTER
The Pt called back in. He needs to reschedule his procedure. Transferred to scheduling.     Lizbeth Basurto RN   Endoscopy Procedure Pre Assessment RN

## 2025-04-25 ENCOUNTER — ANESTHESIA EVENT (OUTPATIENT)
Dept: SURGERY | Facility: AMBULATORY SURGERY CENTER | Age: 80
End: 2025-04-25
Payer: COMMERCIAL

## 2025-04-26 NOTE — ANESTHESIA PREPROCEDURE EVALUATION
Anesthesia Pre-Procedure Evaluation    Patient: Doug Nieves   MRN: [id number] : 1945        Procedure : Procedure(s):  Colonoscopy          Past Medical History:   Diagnosis Date    Gastro-oesophageal reflux disease     Hypertension     Quadriceps tendon rupture 3/16/2013      Past Surgical History:   Procedure Laterality Date    BIOPSY OF SKIN LESION      COLONOSCOPY      COLONOSCOPY N/A 2021    Procedure: COLONOSCOPY, WITH POLYPECTOMY;  Surgeon: Abraham Mohan MD;  Location: [facility] OR    HERNIA REPAIR      REPAIR TENDON QUADRICEPS  3/20/2013    Procedure: REPAIR TENDON QUADRICEPS;  Left Knee Quadricep Tendon Repair   ;  Surgeon: Jorge Taylor MD;  Location[facility]      No Known Allergies   Social History     Tobacco Use    Smoking status: Former     Current packs/day: 0.00     Types: Cigarettes     Quit date: 3/18/1980     Years since quittin.1    Smokeless tobacco: Never    Tobacco comments:     Quit many years ago   Substance Use Topics    Alcohol use: Yes     Comment: occasional       Wt Readings from Last 1 Encounters:   24 124.7 kg (275 lb)        Anesthesia Evaluation            ROS/MED HX  ENT/Pulmonary:  - neg pulmonary ROS     Neurologic:  - neg neurologic ROS     Cardiovascular:     (+) Dyslipidemia hypertension- -   -  - -                                      METS/Exercise Tolerance:     Hematologic:  - neg hematologic  ROS     Musculoskeletal:  - neg musculoskeletal ROS     GI/Hepatic:     (+) GERD,                   Renal/Genitourinary:     (+) renal disease, type: CRI,            Endo:  - neg endo ROS     Psychiatric/Substance Use:  - neg psychiatric ROS     Infectious Disease:  - neg infectious disease ROS     Malignancy:       Other:            Physical Exam    Airway  airway exam normal      Mallampati: II   TM distance: > 3 FB   Neck ROM: full   Mouth opening: > 3 cm    Respiratory Devices and Support         Dental       (+)  "Edentulous      Cardiovascular   cardiovascular exam normal       Rhythm and rate: regular and normal     Pulmonary   pulmonary exam normal        breath sounds clear to auscultation           OUTSIDE LABS:  CBC:   Lab Results   Component Value Date    WBC 5.6 11/02/2021    WBC 6.8 02/12/2018    HGB 15.6 11/02/2021    HGB 15.8 02/12/2018    HCT 47.3 11/02/2021    HCT 48.2 02/12/2018     11/02/2021     02/12/2018     BMP:   Lab Results   Component Value Date     12/30/2024     12/15/2023    POTASSIUM 3.8 12/30/2024    POTASSIUM 3.7 12/15/2023    CHLORIDE 100 12/30/2024    CHLORIDE 100 12/15/2023    CO2 27 12/30/2024    CO2 30 (H) 12/15/2023    BUN 11.7 12/30/2024    BUN 12.1 12/15/2023    CR 0.90 12/30/2024    CR 0.93 12/15/2023     (H) 12/30/2024     (H) 12/15/2023     COAGS: No results found for: \"PTT\", \"INR\", \"FIBR\"  POC:   Lab Results   Component Value Date     (H) 03/20/2013     HEPATIC:   Lab Results   Component Value Date    ALBUMIN 3.9 11/02/2021    PROTTOTAL 6.8 11/17/2017    ALT 37.6 11/17/2017    AST 20.1 11/17/2017    ALKPHOS 63.7 11/17/2017    BILITOTAL 1.7 (H) 11/17/2017     OTHER:   Lab Results   Component Value Date    A1C 5.6 12/15/2023    YASMEEN 9.4 12/30/2024    PHOS 3.1 11/02/2021    TSH 1.43 11/02/2021    SED 7 05/21/2021       Anesthesia Plan    ASA Status:  2    NPO Status:  NPO Appropriate    Anesthesia Type: MAC.     - Reason for MAC: straight local not clinically adequate   Induction: Intravenous.   Maintenance: TIVA.        Consents    Anesthesia Plan(s) and associated risks, benefits, and realistic alternatives discussed. Questions answered and patient/representative(s) expressed understanding.     - Discussed:     - Discussed with:  Patient            Postoperative Care       PONV prophylaxis: Ondansetron (or other 5HT-3), Background Propofol Infusion     Comments:               Lorenzo Craig MD    Clinically Significant Risk Factors Present " on Admission                   # Hypertension: Noted on problem list

## 2025-04-28 ENCOUNTER — ANESTHESIA (OUTPATIENT)
Dept: SURGERY | Facility: AMBULATORY SURGERY CENTER | Age: 80
End: 2025-04-28
Payer: COMMERCIAL

## 2025-04-28 ENCOUNTER — HOSPITAL ENCOUNTER (OUTPATIENT)
Facility: AMBULATORY SURGERY CENTER | Age: 80
Discharge: HOME | End: 2025-04-28
Attending: INTERNAL MEDICINE
Payer: COMMERCIAL

## 2025-04-28 VITALS
DIASTOLIC BLOOD PRESSURE: 87 MMHG | OXYGEN SATURATION: 95 % | BODY MASS INDEX: 31.24 KG/M2 | TEMPERATURE: 97.1 F | RESPIRATION RATE: 16 BRPM | SYSTOLIC BLOOD PRESSURE: 126 MMHG | WEIGHT: 270 LBS | HEIGHT: 78 IN | HEART RATE: 87 BPM

## 2025-04-28 VITALS — HEART RATE: 85 BPM

## 2025-04-28 LAB — COLONOSCOPY: NORMAL

## 2025-04-28 PROCEDURE — 88305 TISSUE EXAM BY PATHOLOGIST: CPT | Mod: TC | Performed by: INTERNAL MEDICINE

## 2025-04-28 PROCEDURE — 88305 TISSUE EXAM BY PATHOLOGIST: CPT | Mod: 26 | Performed by: PATHOLOGY

## 2025-04-28 RX ORDER — ONDANSETRON 4 MG/1
4 TABLET, ORALLY DISINTEGRATING ORAL EVERY 6 HOURS PRN
Status: DISCONTINUED | OUTPATIENT
Start: 2025-04-28 | End: 2025-04-29 | Stop reason: HOSPADM

## 2025-04-28 RX ORDER — NALOXONE HYDROCHLORIDE 0.4 MG/ML
0.2 INJECTION, SOLUTION INTRAMUSCULAR; INTRAVENOUS; SUBCUTANEOUS
Status: DISCONTINUED | OUTPATIENT
Start: 2025-04-28 | End: 2025-04-29 | Stop reason: HOSPADM

## 2025-04-28 RX ORDER — LIDOCAINE 40 MG/G
CREAM TOPICAL
Status: DISCONTINUED | OUTPATIENT
Start: 2025-04-28 | End: 2025-04-28 | Stop reason: HOSPADM

## 2025-04-28 RX ORDER — PROPOFOL 10 MG/ML
INJECTION, EMULSION INTRAVENOUS CONTINUOUS PRN
Status: DISCONTINUED | OUTPATIENT
Start: 2025-04-28 | End: 2025-04-28

## 2025-04-28 RX ORDER — ONDANSETRON 2 MG/ML
4 INJECTION INTRAMUSCULAR; INTRAVENOUS EVERY 6 HOURS PRN
Status: DISCONTINUED | OUTPATIENT
Start: 2025-04-28 | End: 2025-04-29 | Stop reason: HOSPADM

## 2025-04-28 RX ORDER — SODIUM CHLORIDE, SODIUM LACTATE, POTASSIUM CHLORIDE, CALCIUM CHLORIDE 600; 310; 30; 20 MG/100ML; MG/100ML; MG/100ML; MG/100ML
INJECTION, SOLUTION INTRAVENOUS CONTINUOUS
Status: DISCONTINUED | OUTPATIENT
Start: 2025-04-28 | End: 2025-04-28 | Stop reason: HOSPADM

## 2025-04-28 RX ORDER — PROPOFOL 10 MG/ML
INJECTION, EMULSION INTRAVENOUS PRN
Status: DISCONTINUED | OUTPATIENT
Start: 2025-04-28 | End: 2025-04-28

## 2025-04-28 RX ORDER — NALOXONE HYDROCHLORIDE 0.4 MG/ML
0.4 INJECTION, SOLUTION INTRAMUSCULAR; INTRAVENOUS; SUBCUTANEOUS
Status: DISCONTINUED | OUTPATIENT
Start: 2025-04-28 | End: 2025-04-29 | Stop reason: HOSPADM

## 2025-04-28 RX ORDER — ONDANSETRON 2 MG/ML
4 INJECTION INTRAMUSCULAR; INTRAVENOUS
Status: DISCONTINUED | OUTPATIENT
Start: 2025-04-28 | End: 2025-04-28 | Stop reason: HOSPADM

## 2025-04-28 RX ORDER — PROCHLORPERAZINE MALEATE 5 MG/1
5 TABLET ORAL EVERY 6 HOURS PRN
Status: DISCONTINUED | OUTPATIENT
Start: 2025-04-28 | End: 2025-04-29 | Stop reason: HOSPADM

## 2025-04-28 RX ORDER — FLUMAZENIL 0.1 MG/ML
0.2 INJECTION, SOLUTION INTRAVENOUS
Status: DISCONTINUED | OUTPATIENT
Start: 2025-04-28 | End: 2025-04-29 | Stop reason: HOSPADM

## 2025-04-28 RX ORDER — LIDOCAINE HYDROCHLORIDE 20 MG/ML
INJECTION, SOLUTION INFILTRATION; PERINEURAL PRN
Status: DISCONTINUED | OUTPATIENT
Start: 2025-04-28 | End: 2025-04-28

## 2025-04-28 RX ADMIN — SODIUM CHLORIDE, SODIUM LACTATE, POTASSIUM CHLORIDE, CALCIUM CHLORIDE: 600; 310; 30; 20 INJECTION, SOLUTION INTRAVENOUS at 12:23

## 2025-04-28 RX ADMIN — PROPOFOL 50 MG: 10 INJECTION, EMULSION INTRAVENOUS at 12:39

## 2025-04-28 RX ADMIN — PROPOFOL 150 MCG/KG/MIN: 10 INJECTION, EMULSION INTRAVENOUS at 12:39

## 2025-04-28 RX ADMIN — LIDOCAINE HYDROCHLORIDE 50 MG: 20 INJECTION, SOLUTION INFILTRATION; PERINEURAL at 12:39

## 2025-04-28 NOTE — ANESTHESIA POSTPROCEDURE EVALUATION
Patient: Doug Nieves    Procedure: Procedure(s):  COLONOSCOPY, WITH POLYPECTOMY       Anesthesia Type:  MAC    Note:  Disposition: Outpatient   Postop Pain Control: Uneventful            Sign Out: Well controlled pain   PONV: No   Neuro/Psych: Uneventful            Sign Out: Acceptable/Baseline neuro status   Airway/Respiratory: Uneventful            Sign Out: Acceptable/Baseline resp. status   CV/Hemodynamics: Uneventful            Sign Out: Acceptable CV status; No obvious hypovolemia; No obvious fluid overload   Other NRE: NONE   DID A NON-ROUTINE EVENT OCCUR?            Last vitals:  Vitals Value Taken Time   /87 04/28/25 1345   Temp 36.2  C (97.1  F) 04/28/25 1345   Pulse 87 04/28/25 1345   Resp 16 04/28/25 1345   SpO2 94 % 04/28/25 1350   Vitals shown include unfiled device data.    Electronically Signed By: Lorenzo Craig MD  April 28, 2025  2:23 PM

## 2025-04-28 NOTE — ANESTHESIA CARE TRANSFER NOTE
Patient: Doug Nieves    Procedure: Procedure(s):  COLONOSCOPY, WITH POLYPECTOMY       Diagnosis: Benign neoplasm of colon, unspecified part of colon [D12.6]  Diagnosis Additional Information: No value filed.    Anesthesia Type:   MAC     Note:    Oropharynx: oropharynx clear of all foreign objects and spontaneously breathing  Level of Consciousness: awake  Oxygen Supplementation: room air    Independent Airway: airway patency satisfactory and stable  Dentition: dentition unchanged  Vital Signs Stable: post-procedure vital signs reviewed and stable  Report to RN Given: handoff report given  Patient transferred to: Phase II    Handoff Report: Identifed the Patient, Identified the Reponsible Provider, Reviewed the pertinent medical history, Discussed the surgical course, Reviewed Intra-OP anesthesia mangement and issues during anesthesia, Set expectations for post-procedure period and Allowed opportunity for questions and acknowledgement of understanding      Vitals:  Vitals Value Taken Time   /80 04/28/25 1331   Temp 97.4    Pulse 84 04/28/25 1331   Resp 16    SpO2 91 % 04/28/25 1333   Vitals shown include unfiled device data.    Electronically Signed By: CAMACHO Greene CRNA  April 28, 2025  1:34 PM

## 2025-04-28 NOTE — H&P
Doug Nieves  [id number]  male  79 year old      Reason for procedure/surgery: surveillance, last colonoscopy     Patient Active Problem List   Diagnosis    Lumbar radiculopathy    Chronic kidney disease, stage II (mild)    Diverticulosis of large intestine    Sebaceous cyst    Esophageal reflux    Essential hypertension    Open angle with borderline findings, low risk    Benign neoplasm of colon    Rosacea    Other seborrheic keratosis    Shoulder joint pain    Dermatofibroma of lower extremity    Hyperlipidemia LDL goal <100    Prediabetes    BCC (basal cell carcinoma), face       Past Surgical History:    Past Surgical History:   Procedure Laterality Date    BIOPSY OF SKIN LESION      COLONOSCOPY      COLONOSCOPY N/A 2021    Procedure: COLONOSCOPY, WITH POLYPECTOMY;  Surgeon: Abraham Mohan MD;  Location: [facility] OR    HERNIA REPAIR      REPAIR TENDON QUADRICEPS  3/20/2013    Procedure: REPAIR TENDON QUADRICEPS;  Left Knee Quadricep Tendon Repair   ;  Surgeon: Jorge Taylor MD;  Location: [facility]       Past Medical History:   Past Medical History:   Diagnosis Date    Gastro-oesophageal reflux disease     Hypertension     Quadriceps tendon rupture 3/16/2013       Social History:   Social History     Tobacco Use    Smoking status: Former     Current packs/day: 0.00     Types: Cigarettes     Quit date: 3/18/1980     Years since quittin.1    Smokeless tobacco: Never    Tobacco comments:     Quit many years ago   Substance Use Topics    Alcohol use: Yes     Comment: occasional        Family History:   Family History   Problem Relation Age of Onset    Diabetes Mother         doing well at 97    Alzheimer Disease Mother     Genitourinary Problems Father         CKD secondary to being quad    Diabetes Brother     Diabetes Brother     Diabetes Brother     Diabetes Brother        Allergies: No Known Allergies    Active Medications:   Current Outpatient Medications   Medication Sig Dispense  "Refill    atorvastatin (LIPITOR) 40 MG tablet Take 1 tablet (40 mg) by mouth daily. 90 tablet 3    azelastine 137 MCG/SPRAY SOLN Spray 1 spray into both nostrils 2 times daily. 30 mL 5    bisacodyl (DULCOLAX) 5 MG EC tablet Take 2 tablets at 3 pm the day before your procedure. If your procedure is before 11 am, take 2 additional tablets at 11 pm. If your procedure is after 11 am, take 2 additional tablets at 6 am. For additional instructions refer to your colonoscopy prep instructions. 4 tablet 0    cyanocobalamin (VITAMIN B-12) 1000 MCG tablet Take 1 tablet (1,000 mcg) by mouth daily For two weeks and then drop to 1 tablet daily 90 tablet 0    losartan-hydrochlorothiazide (HYZAAR) 100-25 MG tablet Take 1 tablet by mouth daily. 90 tablet 3    omeprazole (PRILOSEC) 20 MG DR capsule Take 1 capsule (20 mg) by mouth daily. 90 capsule 3    polyethylene glycol (GOLYTELY) 236 g suspension The night before the exam at 6 pm drink an 8-ounce glass every 15 minutes until the jug is half empty. If you arrive before 11 AM: Drink the other half of the Golytely jug at 11 PM night before procedure. If you arrive after 11 AM: Drink the other half of the Golytely jug at 6 AM day of procedure. For additional instructions refer to your colonoscopy prep instructions. 4000 mL 0       Systemic Review:   CONSTITUTIONAL: NEGATIVE for fever, chills, change in weight  ENT/MOUTH: NEGATIVE for ear, mouth and throat problems  RESP: NEGATIVE for significant cough or SOB  CV: NEGATIVE for chest pain, palpitations or peripheral edema    Physical Examination:   Vital Signs: BP (!) 130/98 (BP Location: Right arm)   Pulse 82   Temp 97.6  F (36.4  C) (Temporal)   Resp 16   Ht 1.994 m (6' 6.5\")   Wt 122.5 kg (270 lb)   SpO2 95%   BMI 30.81 kg/m    GENERAL: healthy, alert and no distress  NECK: no adenopathy, no asymmetry, masses, or scars  RESP: lungs clear to auscultation - no rales, rhonchi or wheezes  CV: regular rate and rhythm, normal S1 S2, " no S3 or S4, no murmur, click or rub, no peripheral edema and peripheral pulses strong  ABDOMEN: soft, nontender, no hepatosplenomegaly, no masses and bowel sounds normal  MS: no gross musculoskeletal defects noted, no edema    Plan: Appropriate to proceed as scheduled.      Catia Lizama MD  4/28/2025    PCP:  Liliam Weinberg

## 2025-04-29 LAB
PATH REPORT.COMMENTS IMP SPEC: NORMAL
PATH REPORT.COMMENTS IMP SPEC: NORMAL
PATH REPORT.FINAL DX SPEC: NORMAL
PATH REPORT.GROSS SPEC: NORMAL
PATH REPORT.MICROSCOPIC SPEC OTHER STN: NORMAL
PATH REPORT.RELEVANT HX SPEC: NORMAL
PHOTO IMAGE: NORMAL

## 2025-05-13 ENCOUNTER — RESULTS FOLLOW-UP (OUTPATIENT)
Dept: GASTROENTEROLOGY | Facility: CLINIC | Age: 80
End: 2025-05-13

## (undated) DEVICE — ENDO TRAP POLYP E-TRAP 00711099

## (undated) DEVICE — SOL WATER IRRIG 500ML BOTTLE 2F7113

## (undated) DEVICE — KIT ENDO TURNOVER/PROCEDURE CARRY-ON 101822

## (undated) DEVICE — TUBING SUCTION 12"X1/4" N612

## (undated) DEVICE — FCP BIOPSY RADIAL JAW 4 JUMBO 3.2MM CHANNEL M00513370

## (undated) DEVICE — GOWN IMPERVIOUS 2XL BLUE

## (undated) DEVICE — SPECIMEN CONTAINER 3OZ W/FORMALIN 59901

## (undated) DEVICE — KIT ENDO FIRST STEP DISINFECTANT 200ML W/POUCH EP-4

## (undated) DEVICE — ENDO SNARE EXACTO COLD 9MM LOOP 2.4MMX230CM 00711115

## (undated) DEVICE — SOL WATER IRRIG 1000ML BOTTLE 2F7114

## (undated) DEVICE — SUCTION MANIFOLD NEPTUNE 2 SYS 1 PORT 702-025-000

## (undated) DEVICE — TUBING SUCTION MEDI-VAC 1/4"X20' N620A

## (undated) RX ORDER — LIDOCAINE HYDROCHLORIDE 20 MG/ML
SOLUTION OROPHARYNGEAL
Status: DISPENSED
Start: 2021-08-06

## (undated) RX ORDER — DIPHENHYDRAMINE HYDROCHLORIDE 50 MG/ML
INJECTION INTRAMUSCULAR; INTRAVENOUS
Status: DISPENSED
Start: 2021-01-08

## (undated) RX ORDER — FENTANYL CITRATE 50 UG/ML
INJECTION, SOLUTION INTRAMUSCULAR; INTRAVENOUS
Status: DISPENSED
Start: 2021-01-08

## (undated) RX ORDER — ONDANSETRON 2 MG/ML
INJECTION INTRAMUSCULAR; INTRAVENOUS
Status: DISPENSED
Start: 2021-01-08